# Patient Record
Sex: MALE | Race: WHITE | Employment: OTHER | URBAN - METROPOLITAN AREA
[De-identification: names, ages, dates, MRNs, and addresses within clinical notes are randomized per-mention and may not be internally consistent; named-entity substitution may affect disease eponyms.]

---

## 2017-01-25 ENCOUNTER — LAB ENCOUNTER (OUTPATIENT)
Dept: LAB | Facility: HOSPITAL | Age: 62
End: 2017-01-25
Attending: FAMILY MEDICINE
Payer: COMMERCIAL

## 2017-01-25 DIAGNOSIS — Z00.00 ROUTINE GENERAL MEDICAL EXAMINATION AT A HEALTH CARE FACILITY: Primary | ICD-10-CM

## 2017-01-25 LAB
25-HYDROXYVITAMIN D (TOTAL): 43.9 NG/ML (ref 30–100)
ALBUMIN SERPL-MCNC: 3.6 G/DL (ref 3.5–4.8)
ALP LIVER SERPL-CCNC: 55 U/L (ref 45–117)
ALT SERPL-CCNC: 36 U/L (ref 17–63)
AST SERPL-CCNC: 20 U/L (ref 15–41)
BILIRUB SERPL-MCNC: 0.8 MG/DL (ref 0.1–2)
BUN BLD-MCNC: 15 MG/DL (ref 8–20)
CALCIUM BLD-MCNC: 8.6 MG/DL (ref 8.3–10.3)
CHLORIDE: 106 MMOL/L (ref 101–111)
CHOLEST SMN-MCNC: 139 MG/DL (ref ?–200)
CO2: 29 MMOL/L (ref 22–32)
COMPLEXED PSA SERPL-MCNC: 1.29 NG/ML (ref 0.01–4)
CREAT BLD-MCNC: 0.89 MG/DL (ref 0.7–1.3)
GLUCOSE BLD-MCNC: 98 MG/DL (ref 70–99)
HDLC SERPL-MCNC: 43 MG/DL (ref 45–?)
HDLC SERPL: 3.23 {RATIO} (ref ?–4.97)
LDLC SERPL CALC-MCNC: 84 MG/DL (ref ?–130)
M PROTEIN MFR SERPL ELPH: 6.5 G/DL (ref 6.1–8.3)
NONHDLC SERPL-MCNC: 96 MG/DL (ref ?–130)
POTASSIUM SERPL-SCNC: 4.2 MMOL/L (ref 3.6–5.1)
SODIUM SERPL-SCNC: 140 MMOL/L (ref 136–144)
TRIGLYCERIDES: 62 MG/DL (ref ?–150)
VLDL: 12 MG/DL (ref 5–40)

## 2017-01-25 PROCEDURE — 36415 COLL VENOUS BLD VENIPUNCTURE: CPT

## 2017-01-25 PROCEDURE — 80061 LIPID PANEL: CPT

## 2017-01-25 PROCEDURE — 82306 VITAMIN D 25 HYDROXY: CPT

## 2017-01-25 PROCEDURE — 80053 COMPREHEN METABOLIC PANEL: CPT

## 2017-06-15 ENCOUNTER — HOSPITAL ENCOUNTER (OUTPATIENT)
Dept: ULTRASOUND IMAGING | Facility: HOSPITAL | Age: 62
Discharge: HOME | End: 2017-06-15
Attending: OTOLARYNGOLOGY
Payer: COMMERCIAL

## 2017-06-15 DIAGNOSIS — R59.9 ENLARGED LYMPH NODES: ICD-10-CM

## 2017-06-15 PROCEDURE — 76536 US EXAM OF HEAD AND NECK: CPT | Performed by: OTOLARYNGOLOGY

## 2017-09-13 ENCOUNTER — LAB ENCOUNTER (OUTPATIENT)
Dept: LAB | Facility: HOSPITAL | Age: 62
End: 2017-09-13
Attending: FAMILY MEDICINE
Payer: COMMERCIAL

## 2017-09-13 DIAGNOSIS — M35.00 SICCA SYNDROME (HCC): Primary | ICD-10-CM

## 2017-09-13 LAB
25-HYDROXYVITAMIN D (TOTAL): 98.2 NG/ML (ref 30–100)
ALBUMIN SERPL-MCNC: 3.7 G/DL (ref 3.5–4.8)
ALP LIVER SERPL-CCNC: 55 U/L (ref 45–117)
ALT SERPL-CCNC: 37 U/L (ref 17–63)
ANA SCREEN: NEGATIVE
AST SERPL-CCNC: 16 U/L (ref 15–41)
BASOPHILS # BLD AUTO: 0.03 X10(3) UL (ref 0–0.1)
BASOPHILS NFR BLD AUTO: 0.6 %
BILIRUB SERPL-MCNC: 0.6 MG/DL (ref 0.1–2)
BUN BLD-MCNC: 17 MG/DL (ref 8–20)
C-REACTIVE PROTEIN: <0.29 MG/DL (ref ?–1)
CALCIUM BLD-MCNC: 9.1 MG/DL (ref 8.3–10.3)
CHLORIDE: 105 MMOL/L (ref 101–111)
CHOLEST SMN-MCNC: 123 MG/DL (ref ?–200)
CO2: 29 MMOL/L (ref 22–32)
CREAT BLD-MCNC: 0.77 MG/DL (ref 0.7–1.3)
EOSINOPHIL # BLD AUTO: 0.07 X10(3) UL (ref 0–0.3)
EOSINOPHIL NFR BLD AUTO: 1.3 %
ERYTHROCYTE [DISTWIDTH] IN BLOOD BY AUTOMATED COUNT: 12.5 % (ref 11.5–16)
GLUCOSE BLD-MCNC: 97 MG/DL (ref 70–99)
HCT VFR BLD AUTO: 43.6 % (ref 37–53)
HDLC SERPL-MCNC: 45 MG/DL (ref 45–?)
HDLC SERPL: 2.73 {RATIO} (ref ?–4.97)
HGB BLD-MCNC: 14.7 G/DL (ref 13–17)
IMMATURE GRANULOCYTE COUNT: 0.01 X10(3) UL (ref 0–1)
IMMATURE GRANULOCYTE RATIO %: 0.2 %
LDLC SERPL CALC-MCNC: 66 MG/DL (ref ?–130)
LDLC SERPL-MCNC: 12 MG/DL (ref 5–40)
LYMPHOCYTES # BLD AUTO: 1.23 X10(3) UL (ref 0.9–4)
LYMPHOCYTES NFR BLD AUTO: 22.9 %
M PROTEIN MFR SERPL ELPH: 7 G/DL (ref 6.1–8.3)
MCH RBC QN AUTO: 32.8 PG (ref 27–33.2)
MCHC RBC AUTO-ENTMCNC: 33.7 G/DL (ref 31–37)
MCV RBC AUTO: 97.3 FL (ref 80–99)
MONOCYTES # BLD AUTO: 0.64 X10(3) UL (ref 0.1–0.6)
MONOCYTES NFR BLD AUTO: 11.9 %
NEUTROPHIL ABS PRELIM: 3.4 X10 (3) UL (ref 1.3–6.7)
NEUTROPHILS # BLD AUTO: 3.4 X10(3) UL (ref 1.3–6.7)
NEUTROPHILS NFR BLD AUTO: 63.1 %
NONHDLC SERPL-MCNC: 78 MG/DL (ref ?–130)
PLATELET # BLD AUTO: 143 10(3)UL (ref 150–450)
POTASSIUM SERPL-SCNC: 4.3 MMOL/L (ref 3.6–5.1)
PSA SERPL-MCNC: 0.84 NG/ML (ref 0.01–4)
RBC # BLD AUTO: 4.48 X10(6)UL (ref 4.3–5.7)
RED CELL DISTRIBUTION WIDTH-SD: 44.4 FL (ref 35.1–46.3)
RHEUMATOID FACT SERPL-ACNC: <10 IU/ML (ref ?–15)
SED RATE-ML: 2 MM/HR (ref 0–12)
SODIUM SERPL-SCNC: 139 MMOL/L (ref 136–144)
TRIGLYCERIDES: 58 MG/DL (ref ?–150)
WBC # BLD AUTO: 5.4 X10(3) UL (ref 4–13)

## 2017-09-13 PROCEDURE — 86431 RHEUMATOID FACTOR QUANT: CPT

## 2017-09-13 PROCEDURE — 36415 COLL VENOUS BLD VENIPUNCTURE: CPT

## 2017-09-13 PROCEDURE — 84153 ASSAY OF PSA TOTAL: CPT

## 2017-09-13 PROCEDURE — 82306 VITAMIN D 25 HYDROXY: CPT

## 2017-09-13 PROCEDURE — 85652 RBC SED RATE AUTOMATED: CPT

## 2017-09-13 PROCEDURE — 86140 C-REACTIVE PROTEIN: CPT

## 2017-09-13 PROCEDURE — 85025 COMPLETE CBC W/AUTO DIFF WBC: CPT

## 2017-09-13 PROCEDURE — 80053 COMPREHEN METABOLIC PANEL: CPT

## 2017-09-13 PROCEDURE — 86038 ANTINUCLEAR ANTIBODIES: CPT

## 2017-09-13 PROCEDURE — 80061 LIPID PANEL: CPT

## 2017-09-16 ENCOUNTER — HOSPITAL ENCOUNTER (EMERGENCY)
Facility: HOSPITAL | Age: 62
Discharge: HOME | End: 2017-09-16
Attending: EMERGENCY MEDICINE
Payer: COMMERCIAL

## 2017-09-16 ENCOUNTER — APPOINTMENT (OUTPATIENT)
Dept: GENERAL RADIOLOGY | Facility: HOSPITAL | Age: 62
End: 2017-09-16
Attending: EMERGENCY MEDICINE
Payer: COMMERCIAL

## 2017-09-16 VITALS
OXYGEN SATURATION: 99 % | WEIGHT: 149 LBS | BODY MASS INDEX: 22 KG/M2 | RESPIRATION RATE: 18 BRPM | SYSTOLIC BLOOD PRESSURE: 106 MMHG | DIASTOLIC BLOOD PRESSURE: 64 MMHG | TEMPERATURE: 98 F | HEART RATE: 71 BPM

## 2017-09-16 DIAGNOSIS — S60.022A CONTUSION OF LEFT INDEX FINGER WITHOUT DAMAGE TO NAIL, INITIAL ENCOUNTER: Primary | ICD-10-CM

## 2017-09-16 PROCEDURE — 99282 EMERGENCY DEPT VISIT SF MDM: CPT

## 2017-09-16 PROCEDURE — 99283 EMERGENCY DEPT VISIT LOW MDM: CPT

## 2017-09-16 NOTE — ED PROVIDER NOTES
Patient Seen in: [facility] Emergency Department    History   Patient presents with:  Upper Extremity Injury (musculoskeletal)    Stated Complaint: finger inj    HPI    69-year-old male presents with pain and discoloration to the digit of the left h stated complaint: finger inj  Other systems are as noted in HPI. Constitutional and vital signs reviewed. All other systems reviewed and negative except as noted above.     PSFH elements reviewed from today and agreed except as otherwise stated in HPI platelet count has been stable over the past 2 draws and I have no reason to suspect he has had a significant drop in the last 3 days. No other bruising or sources of bleeding reported.         Disposition and Plan     Clinical Impression:  Contusion of le

## 2017-09-16 NOTE — ED INITIAL ASSESSMENT (HPI)
States he had sudden onset of pain of pain ,swelling, ecchymosis to his left index finger yesterday.  No injury

## 2017-09-24 ENCOUNTER — HOSPITAL ENCOUNTER (EMERGENCY)
Facility: HOSPITAL | Age: 62
Discharge: HOME | End: 2017-09-24
Attending: EMERGENCY MEDICINE
Payer: COMMERCIAL

## 2017-09-24 VITALS
OXYGEN SATURATION: 97 % | DIASTOLIC BLOOD PRESSURE: 71 MMHG | BODY MASS INDEX: 21.22 KG/M2 | HEIGHT: 68 IN | WEIGHT: 140 LBS | RESPIRATION RATE: 16 BRPM | SYSTOLIC BLOOD PRESSURE: 118 MMHG | TEMPERATURE: 99 F | HEART RATE: 76 BPM

## 2017-09-24 DIAGNOSIS — R21 RASH: Primary | ICD-10-CM

## 2017-09-24 LAB
APTT PPP: 30.4 SECONDS (ref 25–34)
BASOPHILS # BLD AUTO: 0.04 X10(3) UL (ref 0–0.1)
BASOPHILS NFR BLD AUTO: 0.7 %
BUN BLD-MCNC: 19 MG/DL (ref 8–20)
CALCIUM BLD-MCNC: 8.6 MG/DL (ref 8.3–10.3)
CHLORIDE: 110 MMOL/L (ref 101–111)
CO2: 25 MMOL/L (ref 22–32)
CREAT BLD-MCNC: 0.73 MG/DL (ref 0.7–1.3)
EOSINOPHIL # BLD AUTO: 0.08 X10(3) UL (ref 0–0.3)
EOSINOPHIL NFR BLD AUTO: 1.4 %
ERYTHROCYTE [DISTWIDTH] IN BLOOD BY AUTOMATED COUNT: 12.5 % (ref 11.5–16)
GLUCOSE BLD-MCNC: 109 MG/DL (ref 70–99)
HCT VFR BLD AUTO: 38.6 % (ref 37–53)
HGB BLD-MCNC: 13.4 G/DL (ref 13–17)
IMMATURE GRANULOCYTE COUNT: 0.02 X10(3) UL (ref 0–1)
IMMATURE GRANULOCYTE RATIO %: 0.4 %
INR BLD: 1.23 (ref 0.89–1.11)
LYMPHOCYTES # BLD AUTO: 1.34 X10(3) UL (ref 0.9–4)
LYMPHOCYTES NFR BLD AUTO: 24.2 %
MCH RBC QN AUTO: 32.9 PG (ref 27–33.2)
MCHC RBC AUTO-ENTMCNC: 34.7 G/DL (ref 31–37)
MCV RBC AUTO: 94.8 FL (ref 80–99)
MONOCYTES # BLD AUTO: 0.66 X10(3) UL (ref 0.1–0.6)
MONOCYTES NFR BLD AUTO: 11.9 %
NEUTROPHIL ABS PRELIM: 3.39 X10 (3) UL (ref 1.3–6.7)
NEUTROPHILS # BLD AUTO: 3.39 X10(3) UL (ref 1.3–6.7)
NEUTROPHILS NFR BLD AUTO: 61.4 %
PLATELET # BLD AUTO: 144 10(3)UL (ref 150–450)
POTASSIUM SERPL-SCNC: 3.8 MMOL/L (ref 3.6–5.1)
PSA SERPL DL<=0.01 NG/ML-MCNC: 15.6 SECONDS (ref 12–14.3)
RBC # BLD AUTO: 4.07 X10(6)UL (ref 4.3–5.7)
RED CELL DISTRIBUTION WIDTH-SD: 43.8 FL (ref 35.1–46.3)
SODIUM SERPL-SCNC: 141 MMOL/L (ref 136–144)
WBC # BLD AUTO: 5.5 X10(3) UL (ref 4–13)

## 2017-09-24 PROCEDURE — 36415 COLL VENOUS BLD VENIPUNCTURE: CPT

## 2017-09-24 PROCEDURE — 85610 PROTHROMBIN TIME: CPT | Performed by: EMERGENCY MEDICINE

## 2017-09-24 PROCEDURE — 99283 EMERGENCY DEPT VISIT LOW MDM: CPT

## 2017-09-24 PROCEDURE — 80048 BASIC METABOLIC PNL TOTAL CA: CPT | Performed by: EMERGENCY MEDICINE

## 2017-09-24 PROCEDURE — 85730 THROMBOPLASTIN TIME PARTIAL: CPT | Performed by: EMERGENCY MEDICINE

## 2017-09-24 PROCEDURE — 85025 COMPLETE CBC W/AUTO DIFF WBC: CPT | Performed by: EMERGENCY MEDICINE

## 2017-09-24 NOTE — ED NOTES
Pt screaming from room \"Nurse! Nurse! \" Enter pt room. Pt requests blankets. Pt re-oriented to call light.

## 2017-09-24 NOTE — ED NOTES
Pt updated on poc. No distress noted. Pt denies any needs  Requests lights off in room.  Sts \"I would like to rest.\"

## 2017-09-24 NOTE — ED NOTES
DC instructions handed to pt. No distress noted. Pt denies any needs. Pt thanks staff for care.  Denies need for [facility] out of ED

## 2017-09-24 NOTE — ED PROVIDER NOTES
Patient Seen in: [facility] Emergency Department    History   Patient presents with:  Rash Skin Problem (integumentary)    Stated Complaint: red bumps on legs and feet    HPI    58-year-old white male who presents emergency room today for complaint red bumps on legs and feet  Other systems are as noted in HPI. Constitutional and vital signs reviewed. All other systems reviewed and negative except as noted above. PSFH elements reviewed from today and agreed except as otherwise stated in HPI. Notable for the following:     RBC 4.07 (*)     .0 (*)     Monocyte Absolute 0.66 (*)     All other components within normal limits   PTT, ACTIVATED - Normal    Narrative: The aPTT Heparin Therapeutic Range is approximately 65- 104 seconds.  The

## 2017-10-28 ENCOUNTER — LAB ENCOUNTER (OUTPATIENT)
Dept: LAB | Facility: HOSPITAL | Age: 62
End: 2017-10-28
Attending: Other
Payer: COMMERCIAL

## 2017-10-28 DIAGNOSIS — R20.2 PARESTHESIA: Primary | ICD-10-CM

## 2017-10-28 PROCEDURE — 82607 VITAMIN B-12: CPT

## 2017-10-28 PROCEDURE — 36415 COLL VENOUS BLD VENIPUNCTURE: CPT

## 2017-12-11 ENCOUNTER — HOSPITAL ENCOUNTER (EMERGENCY)
Facility: HOSPITAL | Age: 62
Discharge: HOME | End: 2017-12-11
Attending: EMERGENCY MEDICINE
Payer: COMMERCIAL

## 2017-12-11 VITALS
RESPIRATION RATE: 20 BRPM | SYSTOLIC BLOOD PRESSURE: 125 MMHG | HEART RATE: 65 BPM | WEIGHT: 140 LBS | DIASTOLIC BLOOD PRESSURE: 80 MMHG | OXYGEN SATURATION: 94 % | BODY MASS INDEX: 21 KG/M2 | TEMPERATURE: 98 F

## 2017-12-11 DIAGNOSIS — A05.9 FOOD CONTAMINATION: Primary | ICD-10-CM

## 2017-12-11 PROCEDURE — 99283 EMERGENCY DEPT VISIT LOW MDM: CPT

## 2017-12-11 PROCEDURE — 99282 EMERGENCY DEPT VISIT SF MDM: CPT

## 2017-12-12 NOTE — ED INITIAL ASSESSMENT (HPI)
62ym c/c of allergic rxn Pt state that ate a raw fish and develop a rash on his arm that started 15 min post eating fish

## 2017-12-12 NOTE — ED PROVIDER NOTES
Patient Seen in: [facility] Emergency Department    History   Patient presents with:   Allergic Rxn Allergies (immune)    Stated Complaint: poss reaction to raw fish    HPI    Patient accidentally bought and ate a raw sardine that he purchased at Who °C)  Temp src: Temporal  SpO2: 94 %  O2 Device: None (Room air)    Current:/80   Pulse 65   Temp 98.4 °F (36.9 °C) (Temporal)   Resp 20   Wt 63.5 kg   SpO2 94%   BMI 21.29 kg/m²         Physical Exam    Patient is sitting on an emergency department b

## 2017-12-28 ENCOUNTER — RT VISIT (OUTPATIENT)
Dept: RESPIRATORY THERAPY | Facility: HOSPITAL | Age: 62
End: 2017-12-28
Attending: INTERNAL MEDICINE
Payer: COMMERCIAL

## 2017-12-28 DIAGNOSIS — J45.909 ASTHMA: ICD-10-CM

## 2017-12-28 PROCEDURE — 94726 PLETHYSMOGRAPHY LUNG VOLUMES: CPT

## 2017-12-28 PROCEDURE — 94729 DIFFUSING CAPACITY: CPT

## 2017-12-28 PROCEDURE — 94060 EVALUATION OF WHEEZING: CPT

## 2018-01-02 NOTE — PROCEDURES
Spirometry and  flow volume loop are consistent with  mild airway obstruction. Good respond to bronchodilators   ( The FEV1 improved by 350 ml after albuterol was given (a 14% improvement).    Normal TLC, no evidence of restriction    The diffusing capac

## 2018-01-31 ENCOUNTER — APPOINTMENT (OUTPATIENT)
Dept: GENERAL RADIOLOGY | Facility: HOSPITAL | Age: 63
End: 2018-01-31
Attending: EMERGENCY MEDICINE
Payer: COMMERCIAL

## 2018-01-31 ENCOUNTER — HOSPITAL ENCOUNTER (EMERGENCY)
Facility: HOSPITAL | Age: 63
Discharge: HOME | End: 2018-01-31
Attending: EMERGENCY MEDICINE
Payer: COMMERCIAL

## 2018-01-31 VITALS
BODY MASS INDEX: 20.73 KG/M2 | HEIGHT: 69 IN | DIASTOLIC BLOOD PRESSURE: 70 MMHG | TEMPERATURE: 98 F | SYSTOLIC BLOOD PRESSURE: 117 MMHG | OXYGEN SATURATION: 96 % | HEART RATE: 66 BPM | WEIGHT: 140 LBS | RESPIRATION RATE: 16 BRPM

## 2018-01-31 DIAGNOSIS — S69.92XA FINGERNAIL INJURY, LEFT, INITIAL ENCOUNTER: ICD-10-CM

## 2018-01-31 DIAGNOSIS — S61.309A: Primary | ICD-10-CM

## 2018-01-31 PROCEDURE — 11760 REPAIR OF NAIL BED: CPT

## 2018-01-31 PROCEDURE — 73140 X-RAY EXAM OF FINGER(S): CPT | Performed by: EMERGENCY MEDICINE

## 2018-01-31 PROCEDURE — 99283 EMERGENCY DEPT VISIT LOW MDM: CPT

## 2018-01-31 PROCEDURE — 90471 IMMUNIZATION ADMIN: CPT

## 2018-01-31 RX ORDER — TETANUS AND DIPHTHERIA TOXOIDS ADSORBED 2; 2 [LF]/.5ML; [LF]/.5ML
0.5 INJECTION INTRAMUSCULAR ONCE
Status: DISCONTINUED | OUTPATIENT
Start: 2018-01-31 | End: 2018-01-31

## 2018-01-31 NOTE — ED NOTES
Pt verbalized understanding of care of wound and need to follow up in 2 days. Pt d/c'd with belongings with steady gait.

## 2018-01-31 NOTE — ED INITIAL ASSESSMENT (HPI)
Patient arrives with lacerations to third and fourth digits after falling on pavement. Nails falling off on both fingers. Patient did not hit head, unsure on last tetanus.

## 2018-01-31 NOTE — ED NOTES
Pt verbalizes he would prefer not to receive pertussis due to autoimmune history and concern regarding a reaction. Dr. Catherine Shah at bedside discussing risk/benefit with pt. Decision made that pt will receive Td at this time.

## 2018-01-31 NOTE — ED NOTES
Pt states he would now like the Tdap, based on what can happen as a result of pertussis.  Order changed per pt request.

## 2018-01-31 NOTE — ED PROVIDER NOTES
Patient Seen in: [facility] Emergency Department    History   Patient presents with:  Laceration Abrasion (integumentary)    Stated Complaint: fall onto left hand, nails coming off to 3rd and 4th digits    HPI    58-year-old male who presents to the %  O2 Device: None (Room air)    Current:/61   Pulse 73   Temp 97.9 °F (36.6 °C) (Temporal)   Resp 18   Ht 175.3 cm (5' 9\")   Wt 63.5 kg   SpO2 97%   BMI 20.67 kg/m²         Physical Exam  General: Nontoxic well-appearing 67-year-old male in no dist pm    Follow-up:  David De Guzman MD  [id number] [facility]  [address] [address] [phone]    Schedule an appointment as soon as possible for a visit in 2 days  For wound re-check        Medications Prescribed:  Current Discharge Medication List

## 2018-01-31 NOTE — ED NOTES
Pt awake and alert, appears comfortable. Pt states he was walking up an incline with a wheeled garbage can. Pt states he fell and landed on ground with palms of hands up, garbage can on top. Pt c/o discomfort to left 3rd and 4th fingers.  Pt noted to have l

## 2018-02-24 ENCOUNTER — LAB ENCOUNTER (OUTPATIENT)
Dept: LAB | Facility: HOSPITAL | Age: 63
End: 2018-02-24
Attending: HOSPITALIST
Payer: COMMERCIAL

## 2018-02-24 DIAGNOSIS — R63.4 ABNORMAL LOSS OF WEIGHT: ICD-10-CM

## 2018-02-24 DIAGNOSIS — J30.2 SEASONAL ALLERGIC RHINITIS: Primary | ICD-10-CM

## 2018-02-24 LAB
ALBUMIN SERPL-MCNC: 3.5 G/DL (ref 3.5–4.8)
ALP LIVER SERPL-CCNC: 59 U/L (ref 45–117)
ALT SERPL-CCNC: 35 U/L (ref 17–63)
AST SERPL-CCNC: 17 U/L (ref 15–41)
BASOPHILS # BLD AUTO: 0.05 X10(3) UL (ref 0–0.1)
BASOPHILS NFR BLD AUTO: 0.8 %
BILIRUB SERPL-MCNC: 0.3 MG/DL (ref 0.1–2)
BUN BLD-MCNC: 17 MG/DL (ref 8–20)
CALCIUM BLD-MCNC: 8.9 MG/DL (ref 8.3–10.3)
CHLORIDE: 107 MMOL/L (ref 101–111)
CO2: 29 MMOL/L (ref 22–32)
CREAT BLD-MCNC: 0.81 MG/DL (ref 0.7–1.3)
EOSINOPHIL # BLD AUTO: 0.12 X10(3) UL (ref 0–0.3)
EOSINOPHIL NFR BLD AUTO: 1.9 %
ERYTHROCYTE [DISTWIDTH] IN BLOOD BY AUTOMATED COUNT: 12.7 % (ref 11.5–16)
GLUCOSE BLD-MCNC: 96 MG/DL (ref 70–99)
HCT VFR BLD AUTO: 45.6 % (ref 37–53)
HGB BLD-MCNC: 15.1 G/DL (ref 13–17)
IMMATURE GRANULOCYTE COUNT: 0.03 X10(3) UL (ref 0–1)
IMMATURE GRANULOCYTE RATIO %: 0.5 %
LYMPHOCYTES # BLD AUTO: 1.29 X10(3) UL (ref 0.9–4)
LYMPHOCYTES NFR BLD AUTO: 20.6 %
M PROTEIN MFR SERPL ELPH: 7 G/DL (ref 6.1–8.3)
MCH RBC QN AUTO: 32.7 PG (ref 27–33.2)
MCHC RBC AUTO-ENTMCNC: 33.1 G/DL (ref 31–37)
MCV RBC AUTO: 98.7 FL (ref 80–99)
MONOCYTES # BLD AUTO: 0.66 X10(3) UL (ref 0.1–1)
MONOCYTES NFR BLD AUTO: 10.6 %
NEUTROPHIL ABS PRELIM: 4.1 X10 (3) UL (ref 1.3–6.7)
NEUTROPHILS # BLD AUTO: 4.1 X10(3) UL (ref 1.3–6.7)
NEUTROPHILS NFR BLD AUTO: 65.6 %
PLATELET # BLD AUTO: 150 10(3)UL (ref 150–450)
POTASSIUM SERPL-SCNC: 4.2 MMOL/L (ref 3.6–5.1)
RBC # BLD AUTO: 4.62 X10(6)UL (ref 4.3–5.7)
RED CELL DISTRIBUTION WIDTH-SD: 45.7 FL (ref 35.1–46.3)
SED RATE-ML: 4 MM/HR (ref 0–12)
SODIUM SERPL-SCNC: 141 MMOL/L (ref 136–144)
TSI SER-ACNC: 1.75 MIU/ML (ref 0.35–5.5)
WBC # BLD AUTO: 6.3 X10(3) UL (ref 4–13)

## 2018-02-24 PROCEDURE — 82785 ASSAY OF IGE: CPT

## 2018-02-24 PROCEDURE — 85025 COMPLETE CBC W/AUTO DIFF WBC: CPT

## 2018-02-24 PROCEDURE — 85652 RBC SED RATE AUTOMATED: CPT

## 2018-02-24 PROCEDURE — 84443 ASSAY THYROID STIM HORMONE: CPT

## 2018-02-24 PROCEDURE — 36415 COLL VENOUS BLD VENIPUNCTURE: CPT

## 2018-02-24 PROCEDURE — 86003 ALLG SPEC IGE CRUDE XTRC EA: CPT

## 2018-02-24 PROCEDURE — 80053 COMPREHEN METABOLIC PANEL: CPT

## 2018-02-28 LAB
ALLERGEN,  IGE: <0.1 KU/L
ALLERGEN,  TREE IGE: <0.1 KU/L
ALLERGEN, A.ALTERNATA(TENUIS): 0.25 KU/L
ALLERGEN, BERMUDA GRASS IGE: <0.1 KU/L
ALLERGEN, BOX ELDER/MAPLE IGE: <0.1 KU/L
ALLERGEN, CAT DANDER IGE: <0.1 KU/L
ALLERGEN, COTTONWOOD IGE: <0.1 KU/L
ALLERGEN, D. FARINAE IGE: 15.4 KU/L
ALLERGEN, D.PTERONYSSINUS IGE: 4.75 KU/L
ALLERGEN, DOG DANDER IGE: 0.22 KU/L
ALLERGEN, GERMAN COCKROACH IGE: <0.1 KU/L
ALLERGEN, HORMODENDRUM IGE: <0.1 KU/L
ALLERGEN, MARSH ELDER IGE: 0.26 KU/L
ALLERGEN, MILK (COW) IGE: <0.1 KU/L
ALLERGEN, MOUNTAIN CEDAR IGE: <0.1 KU/L
ALLERGEN, MOUSE EPITHE IGE: <0.1 KU/L
ALLERGEN, MUCOR RACEMOSUS IGE: <0.1 KU/L
ALLERGEN, OAK TREE IGE: <0.1 KU/L
ALLERGEN, PEANUT IGE: <0.1 KU/L
ALLERGEN, PECAN TREE IGE: <0.1 KU/L
ALLERGEN, PENICILLIUM NOTATUM: <0.1 KU/L
ALLERGEN, PIGWEED IGE: <0.1 KU/L
ALLERGEN, RUSSIAN THISTLE IGE: <0.1 KU/L
ALLERGEN, SHORT RAGWEED IGE: 2.72 KU/L
ALLERGEN, TIMOTHY GRASS IGE: 1.3 KU/L
ALLERGEN, WALNUT TREE IGE: <0.1 KU/L
ALLERGEN, WHITE ASH IGE: <0.1 KU/L
ALLERGEN, WHITE MULBERRY IGE: <0.1 KU/L
ALLERGEN,ASPERGILLUS FUMIGATUS: <0.1 KU/L
IMMUNOGLOBULIN E: 192 KU/L

## 2018-03-15 ENCOUNTER — HOSPITAL ENCOUNTER (OUTPATIENT)
Dept: GENERAL RADIOLOGY | Facility: HOSPITAL | Age: 63
Discharge: HOME | End: 2018-03-15
Attending: FAMILY MEDICINE
Payer: COMMERCIAL

## 2018-03-15 DIAGNOSIS — R63.4 WEIGHT LOSS: ICD-10-CM

## 2018-03-15 PROCEDURE — 71046 X-RAY EXAM CHEST 2 VIEWS: CPT | Performed by: FAMILY MEDICINE

## 2018-03-22 ENCOUNTER — HOSPITAL ENCOUNTER (OUTPATIENT)
Dept: MRI IMAGING | Facility: HOSPITAL | Age: 63
Discharge: HOME | End: 2018-03-22
Attending: Other
Payer: COMMERCIAL

## 2018-03-22 DIAGNOSIS — R20.2 PARESTHESIA OF SKIN: ICD-10-CM

## 2018-03-28 PROBLEM — S61.309A: Status: ACTIVE | Noted: 2018-03-28

## 2018-04-02 ENCOUNTER — HOSPITAL ENCOUNTER (OUTPATIENT)
Dept: MRI IMAGING | Facility: HOSPITAL | Age: 63
Discharge: HOME | End: 2018-04-02
Attending: Other
Payer: COMMERCIAL

## 2018-04-02 PROCEDURE — 72141 MRI NECK SPINE W/O DYE: CPT | Performed by: OTHER

## 2018-04-26 ENCOUNTER — APPOINTMENT (OUTPATIENT)
Dept: GENERAL RADIOLOGY | Facility: HOSPITAL | Age: 63
End: 2018-04-26
Payer: COMMERCIAL

## 2018-04-26 ENCOUNTER — HOSPITAL ENCOUNTER (EMERGENCY)
Facility: HOSPITAL | Age: 63
Discharge: HOME | End: 2018-04-27
Payer: COMMERCIAL

## 2018-04-26 DIAGNOSIS — R07.89 CHEST PAIN, ATYPICAL: ICD-10-CM

## 2018-04-26 DIAGNOSIS — J45.21 MILD INTERMITTENT ASTHMA WITH ACUTE EXACERBATION: Primary | ICD-10-CM

## 2018-04-26 PROCEDURE — 85025 COMPLETE CBC W/AUTO DIFF WBC: CPT

## 2018-04-26 PROCEDURE — 71045 X-RAY EXAM CHEST 1 VIEW: CPT

## 2018-04-26 PROCEDURE — 93005 ELECTROCARDIOGRAM TRACING: CPT

## 2018-04-26 PROCEDURE — 80053 COMPREHEN METABOLIC PANEL: CPT

## 2018-04-26 PROCEDURE — 84484 ASSAY OF TROPONIN QUANT: CPT

## 2018-04-26 PROCEDURE — 85378 FIBRIN DEGRADE SEMIQUANT: CPT

## 2018-04-26 PROCEDURE — 99285 EMERGENCY DEPT VISIT HI MDM: CPT

## 2018-04-26 PROCEDURE — 36415 COLL VENOUS BLD VENIPUNCTURE: CPT

## 2018-04-26 PROCEDURE — 93010 ELECTROCARDIOGRAM REPORT: CPT

## 2018-04-26 RX ORDER — IPRATROPIUM BROMIDE AND ALBUTEROL SULFATE 2.5; .5 MG/3ML; MG/3ML
3 SOLUTION RESPIRATORY (INHALATION) ONCE
Status: COMPLETED | OUTPATIENT
Start: 2018-04-26 | End: 2018-04-27

## 2018-04-26 RX ORDER — PREDNISONE 20 MG/1
60 TABLET ORAL DAILY
Qty: 15 TABLET | Refills: 0 | Status: SHIPPED | OUTPATIENT
Start: 2018-04-26 | End: 2018-05-01

## 2018-04-26 RX ORDER — ALBUTEROL SULFATE 90 UG/1
2 AEROSOL, METERED RESPIRATORY (INHALATION) EVERY 4 HOURS PRN
Qty: 1 INHALER | Refills: 0 | Status: SHIPPED | OUTPATIENT
Start: 2018-04-26 | End: 2018-05-26

## 2018-04-27 VITALS
DIASTOLIC BLOOD PRESSURE: 62 MMHG | SYSTOLIC BLOOD PRESSURE: 115 MMHG | HEART RATE: 55 BPM | OXYGEN SATURATION: 99 % | RESPIRATION RATE: 16 BRPM | HEIGHT: 68 IN | WEIGHT: 140 LBS | TEMPERATURE: 98 F | BODY MASS INDEX: 21.22 KG/M2

## 2018-04-27 PROCEDURE — 94640 AIRWAY INHALATION TREATMENT: CPT

## 2018-04-27 PROCEDURE — 84484 ASSAY OF TROPONIN QUANT: CPT

## 2018-04-27 NOTE — ED INITIAL ASSESSMENT (HPI)
Pt presents to ED with complaint of chest tightness x1 day. Pt reports worsening this evening.  Reports minimal SOB

## 2018-04-27 NOTE — ED NOTES
Assumed care, bedside report from [facility]. Pt resting in bed. States, \"pain is not real bad, just more of a tightness in my chest like a 3/10. The shortness of breath is just a little bit. \" Resps easy, regular. Pt denies need at this time.  Call light i

## 2018-04-27 NOTE — ED PROVIDER NOTES
Patient Seen in: [facility] Emergency Department    History   Patient presents with:  Chest Pain Angina (cardiovascular)    Stated Complaint:     HPI    Pleasant male who has a history of allergies, especially notes that he gets more trouble breathi Shoulder        Smoking status: Never Smoker                                                              Smokeless tobacco: Never Used                      Alcohol use:  No                Review of Systems    Positive for stated complaint:   Other systems (FEU) have been shown to contribute to the exclusion of venous thromboembolism with a negative predictive value of approximately 95% when results are used as part of the total clinical evaluation of the patient.    TROPONIN I - Normal   CBC WITH DIFFERENTIA narrowing. No central canal stenosis. C3-C4:  Mild diffuse osteophyte disc complex with mild bilateral neural foraminal narrowing. No central canal stenosis.  C4-C5:  Diffuse osteophyte disc complex with a right paracentral and lateral component with near atelectasis. Lungs are hyperexpanded. Trace right pleural effusion. No measurable pneumothorax. CONCLUSION:  Trace right pleural effusion. Minimal bibasilar atelectasis. Lungs are hyperexpanded.      Dictated by: Lauren Patel MD on 4/26/2018

## 2018-05-08 ENCOUNTER — APPOINTMENT (OUTPATIENT)
Dept: GENERAL RADIOLOGY | Facility: HOSPITAL | Age: 63
End: 2018-05-08
Attending: EMERGENCY MEDICINE
Payer: COMMERCIAL

## 2018-05-08 ENCOUNTER — HOSPITAL ENCOUNTER (EMERGENCY)
Facility: HOSPITAL | Age: 63
Discharge: HOME | End: 2018-05-09
Attending: EMERGENCY MEDICINE
Payer: COMMERCIAL

## 2018-05-08 DIAGNOSIS — R07.9 CHEST PAIN WITH LOW RISK FOR CARDIAC ETIOLOGY: Primary | ICD-10-CM

## 2018-05-08 PROCEDURE — 99285 EMERGENCY DEPT VISIT HI MDM: CPT

## 2018-05-08 PROCEDURE — 85025 COMPLETE CBC W/AUTO DIFF WBC: CPT | Performed by: EMERGENCY MEDICINE

## 2018-05-08 PROCEDURE — 85378 FIBRIN DEGRADE SEMIQUANT: CPT | Performed by: EMERGENCY MEDICINE

## 2018-05-08 PROCEDURE — 93005 ELECTROCARDIOGRAM TRACING: CPT

## 2018-05-08 PROCEDURE — 80053 COMPREHEN METABOLIC PANEL: CPT | Performed by: EMERGENCY MEDICINE

## 2018-05-08 PROCEDURE — 84484 ASSAY OF TROPONIN QUANT: CPT | Performed by: EMERGENCY MEDICINE

## 2018-05-08 PROCEDURE — 93010 ELECTROCARDIOGRAM REPORT: CPT

## 2018-05-08 PROCEDURE — 71045 X-RAY EXAM CHEST 1 VIEW: CPT | Performed by: EMERGENCY MEDICINE

## 2018-05-08 PROCEDURE — 36415 COLL VENOUS BLD VENIPUNCTURE: CPT

## 2018-05-08 RX ORDER — NITROGLYCERIN 0.4 MG/1
0.4 TABLET SUBLINGUAL ONCE
Status: COMPLETED | OUTPATIENT
Start: 2018-05-08 | End: 2018-05-09

## 2018-05-09 ENCOUNTER — HOSPITAL ENCOUNTER (OUTPATIENT)
Facility: HOSPITAL | Age: 63
Setting detail: OBSERVATION
Discharge: HOME | End: 2018-05-10
Attending: EMERGENCY MEDICINE | Admitting: HOSPITALIST
Payer: COMMERCIAL

## 2018-05-09 ENCOUNTER — APPOINTMENT (OUTPATIENT)
Dept: CV DIAGNOSTICS | Facility: HOSPITAL | Age: 63
End: 2018-05-09
Attending: EMERGENCY MEDICINE
Payer: COMMERCIAL

## 2018-05-09 VITALS
TEMPERATURE: 98 F | DIASTOLIC BLOOD PRESSURE: 61 MMHG | OXYGEN SATURATION: 96 % | HEART RATE: 51 BPM | RESPIRATION RATE: 10 BRPM | SYSTOLIC BLOOD PRESSURE: 99 MMHG

## 2018-05-09 DIAGNOSIS — R07.89 CHEST PAIN, ATYPICAL: Primary | ICD-10-CM

## 2018-05-09 DIAGNOSIS — R94.39 ABNORMAL CARDIOVASCULAR STRESS TEST: ICD-10-CM

## 2018-05-09 PROBLEM — R73.9 HYPERGLYCEMIA: Status: ACTIVE | Noted: 2018-05-09

## 2018-05-09 PROBLEM — D69.6 THROMBOCYTOPENIA (HCC): Status: ACTIVE | Noted: 2018-05-09

## 2018-05-09 PROBLEM — R79.89 AZOTEMIA: Status: ACTIVE | Noted: 2018-05-09

## 2018-05-09 PROBLEM — D69.6 THROMBOCYTOPENIA: Status: ACTIVE | Noted: 2018-05-09

## 2018-05-09 PROCEDURE — 93350 STRESS TTE ONLY: CPT | Performed by: EMERGENCY MEDICINE

## 2018-05-09 PROCEDURE — 93018 CV STRESS TEST I&R ONLY: CPT | Performed by: EMERGENCY MEDICINE

## 2018-05-09 PROCEDURE — 36415 COLL VENOUS BLD VENIPUNCTURE: CPT

## 2018-05-09 PROCEDURE — 84484 ASSAY OF TROPONIN QUANT: CPT | Performed by: EMERGENCY MEDICINE

## 2018-05-09 PROCEDURE — 93017 CV STRESS TEST TRACING ONLY: CPT | Performed by: EMERGENCY MEDICINE

## 2018-05-09 PROCEDURE — 99285 EMERGENCY DEPT VISIT HI MDM: CPT

## 2018-05-09 PROCEDURE — 93005 ELECTROCARDIOGRAM TRACING: CPT

## 2018-05-09 PROCEDURE — 81003 URINALYSIS AUTO W/O SCOPE: CPT | Performed by: EMERGENCY MEDICINE

## 2018-05-09 PROCEDURE — 93010 ELECTROCARDIOGRAM REPORT: CPT

## 2018-05-09 RX ORDER — MULTIVITAMIN WITH FOLIC ACID 400 MCG
1 TABLET ORAL DAILY
COMMUNITY

## 2018-05-09 RX ORDER — BISACODYL 10 MG
10 SUPPOSITORY, RECTAL RECTAL
Status: DISCONTINUED | OUTPATIENT
Start: 2018-05-09 | End: 2018-05-10

## 2018-05-09 RX ORDER — TRAMADOL HYDROCHLORIDE 50 MG/1
50 TABLET ORAL EVERY 6 HOURS PRN
COMMUNITY
End: 2018-11-20

## 2018-05-09 RX ORDER — MULTIVIT-MIN/IRON/FOLIC ACID/K 18-600-40
2000 CAPSULE ORAL DAILY
COMMUNITY

## 2018-05-09 RX ORDER — SODIUM PHOSPHATE, DIBASIC AND SODIUM PHOSPHATE, MONOBASIC 7; 19 G/133ML; G/133ML
1 ENEMA RECTAL ONCE AS NEEDED
Status: DISCONTINUED | OUTPATIENT
Start: 2018-05-09 | End: 2018-05-10

## 2018-05-09 RX ORDER — ACETAMINOPHEN 325 MG/1
650 TABLET ORAL EVERY 6 HOURS PRN
Status: DISCONTINUED | OUTPATIENT
Start: 2018-05-09 | End: 2018-05-10

## 2018-05-09 RX ORDER — DOCUSATE SODIUM 100 MG/1
100 CAPSULE, LIQUID FILLED ORAL 2 TIMES DAILY
Status: DISCONTINUED | OUTPATIENT
Start: 2018-05-09 | End: 2018-05-10

## 2018-05-09 RX ORDER — POLYETHYLENE GLYCOL 3350 17 G/17G
17 POWDER, FOR SOLUTION ORAL DAILY PRN
Status: DISCONTINUED | OUTPATIENT
Start: 2018-05-09 | End: 2018-05-10

## 2018-05-09 RX ORDER — ALBUTEROL SULFATE 90 UG/1
2 AEROSOL, METERED RESPIRATORY (INHALATION) EVERY 4 HOURS PRN
Status: DISCONTINUED | OUTPATIENT
Start: 2018-05-09 | End: 2018-05-10

## 2018-05-09 RX ORDER — MAGNESIUM HYDROXIDE/ALUMINUM HYDROXICE/SIMETHICONE 120; 1200; 1200 MG/30ML; MG/30ML; MG/30ML
30 SUSPENSION ORAL ONCE
Status: COMPLETED | OUTPATIENT
Start: 2018-05-09 | End: 2018-05-09

## 2018-05-09 NOTE — ED NOTES
Pt sitting up on stretcher reading bible. Informed pt of admit. Awaiting bed.  Pt stating intermittent cp feeling like twinges in the chest.

## 2018-05-09 NOTE — ED INITIAL ASSESSMENT (HPI)
c/o chest discomfort , sudden onset, seen for same 1 week ago. Pt reports inhaler use at home did not help.

## 2018-05-09 NOTE — ED INITIAL ASSESSMENT (HPI)
Pt states he was in ED approx 6 hours ago for chest pain, went home with plan to follow up with cardiology. When he called his cardiologist office, was informed they no longer take his insurance.   Pt states his pain is also getting worse, and he was instr

## 2018-05-09 NOTE — ED NOTES
Pt is very anxious, asking if he will stop breathing if he takes \"GI cocktail\". Pt's concerns were addressed and pt awaits repeat troponin in 30 minutes. Call light in reach.

## 2018-05-09 NOTE — PLAN OF CARE
CARDIOVASCULAR - ADULT    • Maintains optimal cardiac output and hemodynamic stability Progressing    • Absence of cardiac arrhythmias or at baseline Progressing        Pt AOx3/ anxious/tearful, SR/SB, lugns CTA, bs present x 4 quadrants.  Pt denies SOB, di

## 2018-05-09 NOTE — PROGRESS NOTES
Cardiology followup  Patient chooses not to stay in the hospital we'll set out outpatient nuclear stress test.

## 2018-05-09 NOTE — PROGRESS NOTES
Exercise echo 5/9/2018    9 min 31 sec.  1-1.5 mm st seg depression with associated chest discomfort  Echo images normal to the extent that the endocardium was visualized. The apical views were somewhat difficult to see.   Imaging was also not at target hear

## 2018-05-09 NOTE — CONSULTS
[facility] Cardiology Consultation Ansley Villeda MD    The patient was interviewed, examined, the chart was reviewed and the consult was dictated. This is a 60year old male with a chief complaint of chest discomfort. Impression:  1.   Chest discomfort–atypi

## 2018-05-09 NOTE — H&P
[facility] Hospitalist History and Physical      Patient presents with:  Chest Pain Angina (cardiovascular)       PCP: Eda Mcneal MD      History of Present Illness: Patient is a 60year old male with PMH sig for asthma, Sjogrens presents for eval of chest discomf 1 tablet by mouth daily. Cinnamon 500 MG Oral Tab Take 500 mg by mouth daily. Turmeric 450 MG Oral Cap Take 1 tablet by mouth daily.             Smoking status: Never Smoker    Smokeless tobacco: Never Used    Alcohol use No        Fam Hx  History r chest pain  PATIENT STATED HISTORY: (As transcribed by Technologist)  Pt. with  c/o chest discomfort , sudden onset, seen for same 1 week ago. Pt reports inhaler use at home did not help. FINDINGS:  Normal heart size and pulmonary vascularity.   Mild ri

## 2018-05-09 NOTE — CONSULTS
[address]    PATIENT'S NAME: Julissa Coleman   ATTENDING PHYSICIAN: Robert Camara M.D.   CONSULTING PHYSICIAN: Kathya Magdaleno M.D.    PATIENT ACCOUNT#:   [de-identified]    LOCATION:  [address] [place] [address]  MEDICAL RECORD #:   [id number]       DATE OF BI emergency room, and during his interview with me, he is quite tearful. PAST MEDICAL HISTORY:  Significant for orthopedic problems, pulmonary issues, chest discomfort with negative workup, asthma.     MEDICATIONS:  Vitamins, tramadol, inhalers, over-the-c

## 2018-05-09 NOTE — ED NOTES
Patient is resting comfortably on stretcher c/o intermittent cp with tightness . currently denies pain at this time.

## 2018-05-09 NOTE — ED PROVIDER NOTES
Patient Seen in: [facility] Emergency Department    History   Patient presents with:  Chest Pain Angina (cardiovascular)    Stated Complaint: chest pain    HPI    Patiently initially seen by Dr. Cristo Cooney in the emergency department in care of the wound. Alcohol use: No                Review of Systems    Positive for stated complaint: chest pain  Other systems are as noted in HPI. Constitutional and vital signs reviewed.       All other systems reviewed and negative except as noted above cardiology did call and reported the stress test was abnormal and the [address] discuss the case with Dr. Sohpia Kilgore, apparently the patient has insurance constraints that dictate he needs to be admit

## 2018-05-09 NOTE — ED PROVIDER NOTES
Patient Seen in: [facility] Emergency Department    History   Patient presents with:  Chest Pain Angina (cardiovascular)    Stated Complaint: chest pain    HPI     Patient is a 39-year-old gentleman presenting with fleeting sharp chest pain started noted above.     Physical Exam   ED Triage Vitals  BP: 127/73 [05/08/18 2252]  Pulse: 67 [05/08/18 2252]  Resp: 18 [05/08/18 2252]  Temp: 98.2 °F (36.8 °C) [05/08/18 2252]  Temp src: Temporal [05/08/18 2252]  SpO2: 96 % [05/09/18 0100]  O2 Device: None [facility] clinical [facility] of the [facility]. TROPONIN I - Normal   CBC WITH DIFFERENTIAL WITH PLATELET    Narrative: The following orders were created for panel order CBC WITH DIFFERENTIAL WITH PLATELET.   Procedure                               Abnormality 0500  ------------------------------------------------------------      MDM     IV established. Patient was placed on cardiac monitor. No events are noted. His vitals have remained stable throughout ED course.   He was given nitro and a GI cocktail but t

## 2018-05-10 ENCOUNTER — APPOINTMENT (OUTPATIENT)
Dept: CV DIAGNOSTICS | Facility: HOSPITAL | Age: 63
End: 2018-05-10
Attending: INTERNAL MEDICINE
Payer: COMMERCIAL

## 2018-05-10 VITALS
SYSTOLIC BLOOD PRESSURE: 115 MMHG | HEART RATE: 69 BPM | DIASTOLIC BLOOD PRESSURE: 69 MMHG | RESPIRATION RATE: 18 BRPM | BODY MASS INDEX: 21.98 KG/M2 | WEIGHT: 145 LBS | OXYGEN SATURATION: 98 % | HEIGHT: 68 IN | TEMPERATURE: 98 F

## 2018-05-10 PROCEDURE — 78452 HT MUSCLE IMAGE SPECT MULT: CPT | Performed by: INTERNAL MEDICINE

## 2018-05-10 PROCEDURE — 93018 CV STRESS TEST I&R ONLY: CPT | Performed by: INTERNAL MEDICINE

## 2018-05-10 PROCEDURE — 93017 CV STRESS TEST TRACING ONLY: CPT | Performed by: INTERNAL MEDICINE

## 2018-05-10 PROCEDURE — 85025 COMPLETE CBC W/AUTO DIFF WBC: CPT | Performed by: INTERNAL MEDICINE

## 2018-05-10 PROCEDURE — 80048 BASIC METABOLIC PNL TOTAL CA: CPT | Performed by: INTERNAL MEDICINE

## 2018-05-10 RX ORDER — LORAZEPAM 0.5 MG/1
0.5 TABLET ORAL ONCE AS NEEDED
Status: DISCONTINUED | OUTPATIENT
Start: 2018-05-10 | End: 2018-05-10

## 2018-05-10 NOTE — PROGRESS NOTES
Called to patient room as pt was very anxious about who he should procede with his health care. He did speak with a  in ER who directed him to call his [employer] for more info.  Patient did and found out some more information, but not specifically a

## 2018-05-10 NOTE — PLAN OF CARE
Assumed care of patient around 0730. Pt a/o x 4, RA, NSR on tele monitor. Denies chest pain/discomfort or SOB at present. Pt declined stress test this a.m.  PA notified and spoke to pt who was now agreeable to the stress test. Pt arrived downstairs, and diana

## 2018-05-10 NOTE — PLAN OF CARE
NURSING DISCHARGE NOTE    Discharged Home via Wheelchair. Accompanied by Support staff  Belongings Taken by patient/family. IV removed, IV catheter intact. D/c instructions given. No new prescriptions. F/u appts discussed.   All questions answ

## 2018-05-10 NOTE — PAYOR COMM NOTE
--------------  ADMISSION REVIEW     Payor: Camila GONG/KATIE  Subscriber #:  [id number]  Authorization Number: N/A    Admit date: N/A  Admit time: N/A       Patient Seen in: [facility] Emergency Department    History   Stated Complaint: chest pain Illness: Patient is a 60year old male with PMH sig for asthma, Sjogrens presents for eval of chest discomfort. He is very anxious in the ER and reports a lot of stress in his life. Sx can come on at rest.  Not necessarily related to exertion.   No fevers outpatient stress testing. The patient returned today with more symptoms, and therefore, had a stress echo.   The stress echo was reported as being mildly abnormal and was stated as equivocal.  I looked at the stress test, and though there are ST segment c test.  He is ambivalent about whether he is going to participate in the test today or not.     Assessment/Plan:  -Minimal risk factors for coronary disease, atypical chest pain, negative EKGs and cardiac enzymes, equivocally abnormal stress echo.   Nuclear

## 2018-05-10 NOTE — PROGRESS NOTES
[facility] Cardiology Progress Note - Pura Jones Patient Status:  Inpatient    1955 MRN [id number]   [facility] 2NE-A Attending Teofilo Duque MD   Hosp Day # 1 PCP Dangelo Kennedy MD     Subjective:  Patient has 145 lb (65.8 kg)  04/26/18 2256 : 140 lb (63.5 kg)  01/31/18 1401 : 140 lb (63.5 kg)      Tele: NSR    Physical Exam:    General: Alert and oriented x 3. No apparent distress. No respiratory or constitutional distress.   HEENT: Normocephalic, anicteric scle ENEMA (FLEET) 7-19 GM/118ML enema 133 mL 1 enema Rectal Once PRN       ROS:  General Health: otherwise feels well, weight stable  Constitutiona: no recent fevers  Skin: denies any unusual skin lesions or rashes  Eyes: no visual complaints or deficits  HEEN

## 2018-05-10 NOTE — DISCHARGE SUMMARY
General Medicine Discharge Summary     Patient ID:  Luna Urbina  60year old  7/27/1955    Admit date: 5/9/2018    Discharge date and time: 5/10/18    Attending Physician: Marlyn Cruz MD     Primary Care Physician: Ortiz Newby MD     Reason for adm Pain.      Follow-up with   PCP       Total Time Coordinating Care: Greater than 30 minutes    Patient had opportunity to ask questions and state understand and agree with therapeutic plan as outlined above.      Thank Daryle Presto, MD  [facility]

## 2018-05-10 NOTE — PROGRESS NOTES
Preliminary Cardiac Diagnostic Note:    EKG changes noted after pt walked 10:01 on Karlos protocol for nuclear stress test  Pt denied cardiac symptoms  No arrhythmias  Nuc pending

## 2018-05-14 NOTE — PAYOR COMM NOTE
--------------  DISCHARGE REVIEW    Payor: Yulia GONG/KATIE  Subscriber #:  [id number]  Authorization Number: [id number]    Admit date: N/A  Admit time:  N/A  Discharge Date: 5/10/2018  4:31 PM  This was OBSERVATION stay     Admitting Physician: More Marie have NOT CHANGED    Cholecalciferol (VITAMIN D) 2000 units Oral Cap  Take 2,000 Units by mouth daily. Multiple Vitamin (TAB-A-TANJA) Oral Tab  Take 1 tablet by mouth daily.     Albuterol Sulfate  (90 Base) MCG/ACT Inhalation Aero Soln  Inhale 2 puf

## 2018-06-04 ENCOUNTER — HOSPITAL ENCOUNTER (OUTPATIENT)
Dept: GENERAL RADIOLOGY | Facility: HOSPITAL | Age: 63
Discharge: HOME | End: 2018-06-04
Attending: INTERNAL MEDICINE
Payer: COMMERCIAL

## 2018-06-04 DIAGNOSIS — J90 PLEURAL EFFUSION: ICD-10-CM

## 2018-06-04 PROCEDURE — 71046 X-RAY EXAM CHEST 2 VIEWS: CPT | Performed by: INTERNAL MEDICINE

## 2018-08-15 ENCOUNTER — LAB ENCOUNTER (OUTPATIENT)
Dept: LAB | Facility: HOSPITAL | Age: 63
End: 2018-08-15
Attending: FAMILY MEDICINE
Payer: COMMERCIAL

## 2018-08-15 DIAGNOSIS — Z00.01 ENCOUNTER FOR GENERAL ADULT MEDICAL EXAMINATION WITH ABNORMAL FINDINGS: Primary | ICD-10-CM

## 2018-08-15 LAB
ALBUMIN SERPL-MCNC: 3.6 G/DL (ref 3.5–4.8)
ALBUMIN/GLOB SERPL: 1.1 {RATIO} (ref 1–2)
ALP LIVER SERPL-CCNC: 63 U/L (ref 45–117)
ALT SERPL-CCNC: 38 U/L (ref 17–63)
ANION GAP SERPL CALC-SCNC: 5 MMOL/L (ref 0–18)
AST SERPL-CCNC: 19 U/L (ref 15–41)
BASOPHILS # BLD AUTO: 0.03 X10(3) UL (ref 0–0.1)
BASOPHILS NFR BLD AUTO: 0.6 %
BILIRUB SERPL-MCNC: 0.7 MG/DL (ref 0.1–2)
BILIRUB UR QL STRIP.AUTO: NEGATIVE
BUN BLD-MCNC: 13 MG/DL (ref 8–20)
BUN/CREAT SERPL: 15.5 (ref 10–20)
CALCIUM BLD-MCNC: 9.1 MG/DL (ref 8.3–10.3)
CHLORIDE SERPL-SCNC: 107 MMOL/L (ref 101–111)
CHOLEST SMN-MCNC: 130 MG/DL (ref ?–200)
CLARITY UR REFRACT.AUTO: CLEAR
CO2 SERPL-SCNC: 29 MMOL/L (ref 22–32)
CREAT BLD-MCNC: 0.84 MG/DL (ref 0.7–1.3)
EOSINOPHIL # BLD AUTO: 0.07 X10(3) UL (ref 0–0.3)
EOSINOPHIL NFR BLD AUTO: 1.4 %
ERYTHROCYTE [DISTWIDTH] IN BLOOD BY AUTOMATED COUNT: 12.4 % (ref 11.5–16)
GLOBULIN PLAS-MCNC: 3.2 G/DL (ref 2.5–3.7)
GLUCOSE BLD-MCNC: 98 MG/DL (ref 70–99)
GLUCOSE UR STRIP.AUTO-MCNC: NEGATIVE MG/DL
HCT VFR BLD AUTO: 44.4 % (ref 37–53)
HDLC SERPL-MCNC: 40 MG/DL (ref 40–59)
HGB BLD-MCNC: 14.9 G/DL (ref 13–17)
IMMATURE GRANULOCYTE COUNT: 0.01 X10(3) UL (ref 0–1)
IMMATURE GRANULOCYTE RATIO %: 0.2 %
KETONES UR STRIP.AUTO-MCNC: NEGATIVE MG/DL
LDLC SERPL CALC-MCNC: 76 MG/DL (ref ?–100)
LEUKOCYTE ESTERASE UR QL STRIP.AUTO: NEGATIVE
LYMPHOCYTES # BLD AUTO: 1.13 X10(3) UL (ref 0.9–4)
LYMPHOCYTES NFR BLD AUTO: 22.2 %
M PROTEIN MFR SERPL ELPH: 6.8 G/DL (ref 6.1–8.3)
MCH RBC QN AUTO: 32.4 PG (ref 27–33.2)
MCHC RBC AUTO-ENTMCNC: 33.6 G/DL (ref 31–37)
MCV RBC AUTO: 96.5 FL (ref 80–99)
MONOCYTES # BLD AUTO: 0.62 X10(3) UL (ref 0.1–1)
MONOCYTES NFR BLD AUTO: 12.2 %
NEUTROPHIL ABS PRELIM: 3.24 X10 (3) UL (ref 1.3–6.7)
NEUTROPHILS # BLD AUTO: 3.24 X10(3) UL (ref 1.3–6.7)
NEUTROPHILS NFR BLD AUTO: 63.4 %
NITRITE UR QL STRIP.AUTO: NEGATIVE
NONHDLC SERPL-MCNC: 90 MG/DL (ref ?–130)
OSMOLALITY SERPL CALC.SUM OF ELEC: 292 MOSM/KG (ref 275–295)
PH UR STRIP.AUTO: 6 [PH] (ref 4.5–8)
PLATELET # BLD AUTO: 171 10(3)UL (ref 150–450)
POTASSIUM SERPL-SCNC: 4.1 MMOL/L (ref 3.6–5.1)
PROT UR STRIP.AUTO-MCNC: NEGATIVE MG/DL
PSA SERPL-MCNC: 1.79 NG/ML (ref 0.01–4)
RBC # BLD AUTO: 4.6 X10(6)UL (ref 4.3–5.7)
RBC UR QL AUTO: NEGATIVE
RED CELL DISTRIBUTION WIDTH-SD: 43.8 FL (ref 35.1–46.3)
SODIUM SERPL-SCNC: 141 MMOL/L (ref 136–144)
SP GR UR STRIP.AUTO: <1.005 (ref 1–1.03)
TRIGL SERPL-MCNC: 69 MG/DL (ref 30–149)
UROBILINOGEN UR STRIP.AUTO-MCNC: <2 MG/DL
VIT D+METAB SERPL-MCNC: 37.8 NG/ML (ref 30–100)
VLDLC SERPL CALC-MCNC: 14 MG/DL (ref 0–30)
WBC # BLD AUTO: 5.1 X10(3) UL (ref 4–13)

## 2018-08-15 PROCEDURE — 85025 COMPLETE CBC W/AUTO DIFF WBC: CPT

## 2018-08-15 PROCEDURE — 84153 ASSAY OF PSA TOTAL: CPT

## 2018-08-15 PROCEDURE — 80053 COMPREHEN METABOLIC PANEL: CPT

## 2018-08-15 PROCEDURE — 81003 URINALYSIS AUTO W/O SCOPE: CPT

## 2018-08-15 PROCEDURE — 36415 COLL VENOUS BLD VENIPUNCTURE: CPT

## 2018-08-15 PROCEDURE — 82306 VITAMIN D 25 HYDROXY: CPT

## 2018-08-15 PROCEDURE — 80061 LIPID PANEL: CPT

## 2018-09-10 ENCOUNTER — HOSPITAL ENCOUNTER (OUTPATIENT)
Dept: MRI IMAGING | Facility: HOSPITAL | Age: 63
Discharge: HOME | End: 2018-09-10
Attending: INTERNAL MEDICINE
Payer: COMMERCIAL

## 2018-09-10 DIAGNOSIS — R10.9 ABDOMINAL PAIN: ICD-10-CM

## 2018-09-10 DIAGNOSIS — R63.4 WEIGHT LOSS: ICD-10-CM

## 2018-09-19 ENCOUNTER — HOSPITAL ENCOUNTER (OUTPATIENT)
Dept: MRI IMAGING | Facility: HOSPITAL | Age: 63
Discharge: HOME | End: 2018-09-19
Attending: INTERNAL MEDICINE
Payer: COMMERCIAL

## 2018-09-19 DIAGNOSIS — R10.9 ABDOMINAL PAIN: ICD-10-CM

## 2018-09-19 DIAGNOSIS — R63.4 WEIGHT LOSS: ICD-10-CM

## 2018-09-19 PROCEDURE — 74181 MRI ABDOMEN W/O CONTRAST: CPT | Performed by: INTERNAL MEDICINE

## 2018-09-19 PROCEDURE — 72195 MRI PELVIS W/O DYE: CPT | Performed by: INTERNAL MEDICINE

## 2018-11-20 ENCOUNTER — LAB ENCOUNTER (OUTPATIENT)
Dept: LAB | Facility: HOSPITAL | Age: 63
End: 2018-11-20
Attending: FAMILY MEDICINE
Payer: COMMERCIAL

## 2018-11-20 DIAGNOSIS — E55.9 AVITAMINOSIS D: Primary | ICD-10-CM

## 2018-11-20 DIAGNOSIS — Z00.01 ENCOUNTER FOR GENERAL ADULT MEDICAL EXAMINATION WITH ABNORMAL FINDINGS: ICD-10-CM

## 2018-11-20 PROCEDURE — 84153 ASSAY OF PSA TOTAL: CPT

## 2018-11-20 PROCEDURE — 36415 COLL VENOUS BLD VENIPUNCTURE: CPT

## 2018-11-20 PROCEDURE — 80061 LIPID PANEL: CPT

## 2018-11-20 PROCEDURE — 82306 VITAMIN D 25 HYDROXY: CPT

## 2018-11-20 PROCEDURE — 80053 COMPREHEN METABOLIC PANEL: CPT

## 2019-08-19 ENCOUNTER — LAB ENCOUNTER (OUTPATIENT)
Dept: LAB | Facility: HOSPITAL | Age: 64
End: 2019-08-19
Attending: FAMILY MEDICINE
Payer: MEDICARE

## 2019-08-19 DIAGNOSIS — N13.9 ACUTE UNILATERAL OBSTRUCTIVE UROPATHY: ICD-10-CM

## 2019-08-19 DIAGNOSIS — E55.9 VITAMIN D DEFICIENCY: ICD-10-CM

## 2019-08-19 DIAGNOSIS — I10 ESSENTIAL HYPERTENSION, MALIGNANT: Primary | ICD-10-CM

## 2019-08-19 LAB
ALBUMIN SERPL-MCNC: 3.5 G/DL (ref 3.4–5)
ALBUMIN/GLOB SERPL: 1.1 {RATIO} (ref 1–2)
ALP LIVER SERPL-CCNC: 72 U/L (ref 45–117)
ALT SERPL-CCNC: 40 U/L (ref 16–61)
ANION GAP SERPL CALC-SCNC: 4 MMOL/L (ref 0–18)
AST SERPL-CCNC: 21 U/L (ref 15–37)
BASOPHILS # BLD AUTO: 0.06 X10(3) UL (ref 0–0.2)
BASOPHILS NFR BLD AUTO: 1 %
BILIRUB SERPL-MCNC: 0.6 MG/DL (ref 0.1–2)
BILIRUB UR QL STRIP.AUTO: NEGATIVE
BUN BLD-MCNC: 11 MG/DL (ref 7–18)
BUN/CREAT SERPL: 13.6 (ref 10–20)
CALCIUM BLD-MCNC: 8.5 MG/DL (ref 8.5–10.1)
CHLORIDE SERPL-SCNC: 108 MMOL/L (ref 98–112)
CHOLEST SMN-MCNC: 124 MG/DL (ref ?–200)
CLARITY UR REFRACT.AUTO: CLEAR
CO2 SERPL-SCNC: 30 MMOL/L (ref 21–32)
COLOR UR AUTO: YELLOW
CREAT BLD-MCNC: 0.81 MG/DL (ref 0.7–1.3)
DEPRECATED RDW RBC AUTO: 47.3 FL (ref 35.1–46.3)
EOSINOPHIL # BLD AUTO: 0.14 X10(3) UL (ref 0–0.7)
EOSINOPHIL NFR BLD AUTO: 2.4 %
ERYTHROCYTE [DISTWIDTH] IN BLOOD BY AUTOMATED COUNT: 12.9 % (ref 11–15)
GLOBULIN PLAS-MCNC: 3.3 G/DL (ref 2.8–4.4)
GLUCOSE BLD-MCNC: 92 MG/DL (ref 70–99)
GLUCOSE UR STRIP.AUTO-MCNC: NEGATIVE MG/DL
HCT VFR BLD AUTO: 45.7 % (ref 39–53)
HDLC SERPL-MCNC: 38 MG/DL (ref 40–59)
HGB BLD-MCNC: 15 G/DL (ref 13–17.5)
IMM GRANULOCYTES # BLD AUTO: 0.02 X10(3) UL (ref 0–1)
IMM GRANULOCYTES NFR BLD: 0.3 %
KETONES UR STRIP.AUTO-MCNC: NEGATIVE MG/DL
LDLC SERPL CALC-MCNC: 70 MG/DL (ref ?–100)
LEUKOCYTE ESTERASE UR QL STRIP.AUTO: NEGATIVE
LYMPHOCYTES # BLD AUTO: 1.35 X10(3) UL (ref 1–4)
LYMPHOCYTES NFR BLD AUTO: 23 %
M PROTEIN MFR SERPL ELPH: 6.8 G/DL (ref 6.4–8.2)
MCH RBC QN AUTO: 32.8 PG (ref 26–34)
MCHC RBC AUTO-ENTMCNC: 32.8 G/DL (ref 31–37)
MCV RBC AUTO: 99.8 FL (ref 80–100)
MONOCYTES # BLD AUTO: 0.71 X10(3) UL (ref 0.1–1)
MONOCYTES NFR BLD AUTO: 12.1 %
NEUTROPHILS # BLD AUTO: 3.6 X10 (3) UL (ref 1.5–7.7)
NEUTROPHILS # BLD AUTO: 3.6 X10(3) UL (ref 1.5–7.7)
NEUTROPHILS NFR BLD AUTO: 61.2 %
NITRITE UR QL STRIP.AUTO: NEGATIVE
NONHDLC SERPL-MCNC: 86 MG/DL (ref ?–130)
OSMOLALITY SERPL CALC.SUM OF ELEC: 293 MOSM/KG (ref 275–295)
PH UR STRIP.AUTO: 6 [PH] (ref 4.5–8)
PLATELET # BLD AUTO: 152 10(3)UL (ref 150–450)
POTASSIUM SERPL-SCNC: 4.7 MMOL/L (ref 3.5–5.1)
PROT UR STRIP.AUTO-MCNC: NEGATIVE MG/DL
PSA SERPL-MCNC: 1.27 NG/ML (ref ?–4)
RBC # BLD AUTO: 4.58 X10(6)UL (ref 3.8–5.8)
RBC UR QL AUTO: NEGATIVE
SODIUM SERPL-SCNC: 142 MMOL/L (ref 136–145)
SP GR UR STRIP.AUTO: 1.01 (ref 1–1.03)
TRIGL SERPL-MCNC: 81 MG/DL (ref 30–149)
UROBILINOGEN UR STRIP.AUTO-MCNC: <2 MG/DL
VIT D+METAB SERPL-MCNC: 53.1 NG/ML (ref 30–100)
VLDLC SERPL CALC-MCNC: 16 MG/DL (ref 0–30)
WBC # BLD AUTO: 5.9 X10(3) UL (ref 4–11)

## 2019-08-19 PROCEDURE — 81003 URINALYSIS AUTO W/O SCOPE: CPT

## 2019-08-19 PROCEDURE — 82306 VITAMIN D 25 HYDROXY: CPT

## 2019-08-19 PROCEDURE — 80053 COMPREHEN METABOLIC PANEL: CPT

## 2019-08-19 PROCEDURE — 36415 COLL VENOUS BLD VENIPUNCTURE: CPT

## 2019-08-19 PROCEDURE — 80061 LIPID PANEL: CPT

## 2019-08-19 PROCEDURE — 85025 COMPLETE CBC W/AUTO DIFF WBC: CPT

## 2019-08-19 PROCEDURE — 84153 ASSAY OF PSA TOTAL: CPT

## 2019-08-21 ENCOUNTER — HOSPITAL ENCOUNTER (OUTPATIENT)
Dept: ULTRASOUND IMAGING | Facility: HOSPITAL | Age: 64
Discharge: HOME | End: 2019-08-21
Payer: MEDICARE

## 2019-08-21 DIAGNOSIS — R10.2 PERINEUM PAIN, MALE: ICD-10-CM

## 2019-08-21 PROCEDURE — 76882 US LMTD JT/FCL EVL NVASC XTR: CPT | Performed by: SURGERY

## 2019-08-21 PROCEDURE — 76882 US LMTD JT/FCL EVL NVASC XTR: CPT

## 2019-08-27 ENCOUNTER — OFFICE VISIT (OUTPATIENT)
Dept: SURGERY | Facility: CLINIC | Age: 64
End: 2019-08-27
Payer: MEDICARE

## 2019-08-27 VITALS
BODY MASS INDEX: 21.48 KG/M2 | DIASTOLIC BLOOD PRESSURE: 63 MMHG | WEIGHT: 145 LBS | HEART RATE: 65 BPM | SYSTOLIC BLOOD PRESSURE: 121 MMHG | HEIGHT: 69 IN | TEMPERATURE: 99 F

## 2019-08-27 DIAGNOSIS — R10.31 INGUINAL PAIN OF BOTH SIDES: Primary | ICD-10-CM

## 2019-08-27 DIAGNOSIS — R10.32 INGUINAL PAIN OF BOTH SIDES: Primary | ICD-10-CM

## 2019-08-27 DIAGNOSIS — L72.9 SCROTAL CYST: ICD-10-CM

## 2019-08-27 PROCEDURE — 99205 OFFICE O/P NEW HI 60 MIN: CPT | Performed by: COLON & RECTAL SURGERY

## 2019-08-27 NOTE — PATIENT INSTRUCTIONS
This patient presents with bilateral inguinal pain. He has a prolonged history regarding both testes in the spermatic cords bilaterally. He had an acute bulge and pain on the morning of July 15, 2019. He was moving heavy equipment.   He describes an ac He has never had prostate cancer or treatment. He is not on any blood thinners and has no cardiac history. He has never been told that he had to take antibiotic therapy prior to any dental procedures or operations.     He does suffer from vitiligo and overnight. Most likely it is interrupted venous drainage from the clipping of the left testicular vein, multiple procedures and the cord that are impairing the lymphatic outflow from the testicle.     At this point I have asked the patient to schedule a la

## 2019-08-27 NOTE — H&P
New Patient Visit Note       Active Problems      1. Inguinal pain of both sides    2.  Scrotal cyst        Chief Complaint   Patient presents with:  Hernia: pt c/o of bilateral groin pain onset since 7/2019      History of Present Illness   This patient pr nausea with this pain or vomiting with the pain. Both testes are remaining in place with no previous resections despite his multiple resections of varicoceles, spermatocele, and epididymal cyst.    He does not have any prostate problems.   He has never h Stroke Mother    • Diabetes Brother      Social History    Socioeconomic History      Marital status: Single      Spouse name: Not on file      Number of children: Not on file      Years of education: Not on file      Highest education level: Not on file and sleep disturbance.        Physical Findings   /63   Pulse 65   Temp 98.8 °F (37.1 °C) (Oral)   Ht 69\"   Wt 145 lb   BMI 21.41 kg/m²   Physical Exam   Abdominal: Bowel sounds are normal. He exhibits no distension, no fluid wave, no ascites and no No inguinal adenopathy present. Left: No inguinal adenopathy present. Nursing note and vitals reviewed.           Assessment   Inguinal pain of both sides  (primary encounter diagnosis)  Scrotal cyst      Plan   This patient presents with bilateral pain or vomiting with the pain. Both testes are remaining in place with no previous resections despite his multiple resections of varicoceles, spermatocele, and epididymal cyst.    He does not have any prostate problems.   He has never had prostate cance operation through a transverse incision. I believe his current swelling is not related to her hernia. It is most likely related to swelling of the cord structures that travels up the inguinal canal.  It always resolves overnight.   Most likely it is int

## 2019-09-04 ENCOUNTER — TELEPHONE (OUTPATIENT)
Dept: SURGERY | Facility: CLINIC | Age: 64
End: 2019-09-04

## 2019-09-04 NOTE — TELEPHONE ENCOUNTER
Est pt who has appt on Monday, states he is having increased groin pain and swelling and is wondering if he should schedule an MRI prior to his appt. Per office notes this was not indicated, will speak with surgeon and call pt back.

## 2019-09-09 ENCOUNTER — OFFICE VISIT (OUTPATIENT)
Dept: SURGERY | Facility: CLINIC | Age: 64
End: 2019-09-09
Payer: MEDICARE

## 2019-09-09 VITALS — SYSTOLIC BLOOD PRESSURE: 96 MMHG | HEART RATE: 78 BPM | DIASTOLIC BLOOD PRESSURE: 60 MMHG | TEMPERATURE: 98 F

## 2019-09-09 DIAGNOSIS — R10.32 INGUINAL PAIN OF BOTH SIDES: Primary | ICD-10-CM

## 2019-09-09 DIAGNOSIS — N43.3 HYDROCELE, UNSPECIFIED HYDROCELE TYPE: ICD-10-CM

## 2019-09-09 DIAGNOSIS — R10.31 INGUINAL PAIN OF BOTH SIDES: Primary | ICD-10-CM

## 2019-09-09 DIAGNOSIS — L72.9 SCROTAL CYST: ICD-10-CM

## 2019-09-09 PROCEDURE — 99214 OFFICE O/P EST MOD 30 MIN: CPT | Performed by: COLON & RECTAL SURGERY

## 2019-09-09 NOTE — PROGRESS NOTES
Follow Up Visit Note       Active Problems      1. Inguinal pain of both sides    2. Scrotal cyst    3.  Hydrocele, unspecified hydrocele type          Chief Complaint   Patient presents with:  Hernia:  LEFT INGUINAL HERNIA CHECK -- C/O PAIN 3/10, DENIES FE clearly worse with heavy lifting and exercise. He does not have a chronic cough. He has no trouble urinating. He has no symptoms of severe constipation. He has been to his family doctor who noticed a bulge and felt that he had a left-sided hernia. L KNEE MENICUS REPAIR, R AND L SHOULDER ROTATOR CUFF  REPAIR   • OTHER SURGICAL HISTORY  80's    L varicocelectomy   • OTHER SURGICAL HISTORY  90's    R hydrocelectomy   • OTHER SURGICAL HISTORY      L&R Shoulder       The family history and social hi difficulty urinating, dysuria, frequency and urgency. Musculoskeletal: Negative for arthralgias and myalgias. Skin: Negative for color change and rash. Neurological: Negative for tremors, syncope and weakness.    Hematological: Negative for adenopathy clipping of a left sided varicocele. Clinical exam of the left cord reveals him to have a thickened cord, exquisitely tender. It is thickened all the way down to the top of the testicle. Left testicle is somewhat atrophic.   There are no palpable extra c groin. It had associated pain. There was burning and went to a dull ache. This is happened multiple times. He actually saw another surgeon, Iqra Henderson. He ordered an ultrasound of the inguinal regions bilaterally.   No hernia was appreciated, mul is concerned about his autoimmune problems. He has had a previous umbilical hernia repair at the time of 1 of his scrotal operations. No mesh was placed. At today's office visit he has severe pain in the left groin, less pain in the right groin.     Cl He is instructed to follow-up with 1 of these physicians within the next few weeks. No orders of the defined types were placed in this encounter. Imaging & Referrals   None    Follow Up  Return if symptoms worsen or fail to improve.     Daisy Singh

## 2019-09-12 NOTE — PATIENT INSTRUCTIONS
I am seeing this patient for further surgical consultation regarding severe bilateral inguinal pain. The patient was seen and evaluated a few weeks ago. He has made a late appointment today to evaluate him at the time of his most significant.   Of the d has never had nausea with this pain or vomiting with the pain. Both testes are remaining in place with no previous resections despite his multiple resections of varicoceles, spermatocele, and epididymal cyst.     He does not have any prostate problems. He should return to the attention of urology for any further intervention regarding his pain in the left cord, and bilateral inguinal pain. I told him that he is certainly at risk to develop hernias.   If he gets any swelling beyond the cord, he should r

## 2019-10-25 ENCOUNTER — HOSPITAL ENCOUNTER (OUTPATIENT)
Dept: MRI IMAGING | Age: 64
Discharge: HOME | End: 2019-10-25
Attending: ORTHOPAEDIC SURGERY
Payer: MEDICARE

## 2019-10-25 DIAGNOSIS — M94.262 CHONDROMALACIA OF MEDIAL CONDYLE OF LEFT FEMUR: ICD-10-CM

## 2019-10-25 PROCEDURE — 73721 MRI JNT OF LWR EXTRE W/O DYE: CPT | Performed by: ORTHOPAEDIC SURGERY

## 2020-01-13 ENCOUNTER — LAB ENCOUNTER (OUTPATIENT)
Dept: LAB | Facility: HOSPITAL | Age: 65
End: 2020-01-13
Attending: FAMILY MEDICINE
Payer: MEDICARE

## 2020-01-13 DIAGNOSIS — K11.7 DISTURBANCE OF SALIVARY SECRETION: Primary | ICD-10-CM

## 2020-01-13 DIAGNOSIS — R59.0 BILATERAL HILAR ADENOPATHY SYNDROME: ICD-10-CM

## 2020-01-13 DIAGNOSIS — E55.9 VITAMIN D DEFICIENCY: ICD-10-CM

## 2020-01-13 LAB
ALBUMIN SERPL-MCNC: 3.7 G/DL (ref 3.4–5)
ALBUMIN/GLOB SERPL: 1 {RATIO} (ref 1–2)
ALP LIVER SERPL-CCNC: 62 U/L (ref 45–117)
ALT SERPL-CCNC: 34 U/L (ref 16–61)
ANION GAP SERPL CALC-SCNC: 5 MMOL/L (ref 0–18)
AST SERPL-CCNC: 16 U/L (ref 15–37)
BASOPHILS # BLD AUTO: 0.05 X10(3) UL (ref 0–0.2)
BASOPHILS NFR BLD AUTO: 0.9 %
BILIRUB SERPL-MCNC: 0.9 MG/DL (ref 0.1–2)
BUN BLD-MCNC: 9 MG/DL (ref 7–18)
BUN/CREAT SERPL: 11.1 (ref 10–20)
CALCIUM BLD-MCNC: 9.1 MG/DL (ref 8.5–10.1)
CHLORIDE SERPL-SCNC: 110 MMOL/L (ref 98–112)
CO2 SERPL-SCNC: 29 MMOL/L (ref 21–32)
CREAT BLD-MCNC: 0.81 MG/DL (ref 0.7–1.3)
CRP SERPL-MCNC: <0.29 MG/DL (ref ?–0.3)
DEPRECATED RDW RBC AUTO: 44.7 FL (ref 35.1–46.3)
EOSINOPHIL # BLD AUTO: 0.07 X10(3) UL (ref 0–0.7)
EOSINOPHIL NFR BLD AUTO: 1.3 %
ERYTHROCYTE [DISTWIDTH] IN BLOOD BY AUTOMATED COUNT: 12.2 % (ref 11–15)
GLOBULIN PLAS-MCNC: 3.7 G/DL (ref 2.8–4.4)
GLUCOSE BLD-MCNC: 93 MG/DL (ref 70–99)
HCT VFR BLD AUTO: 46.7 % (ref 39–53)
HGB BLD-MCNC: 15.3 G/DL (ref 13–17.5)
IMM GRANULOCYTES # BLD AUTO: 0.01 X10(3) UL (ref 0–1)
IMM GRANULOCYTES NFR BLD: 0.2 %
LYMPHOCYTES # BLD AUTO: 1.29 X10(3) UL (ref 1–4)
LYMPHOCYTES NFR BLD AUTO: 23.2 %
M PROTEIN MFR SERPL ELPH: 7.4 G/DL (ref 6.4–8.2)
MCH RBC QN AUTO: 32.4 PG (ref 26–34)
MCHC RBC AUTO-ENTMCNC: 32.8 G/DL (ref 31–37)
MCV RBC AUTO: 98.9 FL (ref 80–100)
MONOCYTES # BLD AUTO: 0.58 X10(3) UL (ref 0.1–1)
MONOCYTES NFR BLD AUTO: 10.4 %
NEUTROPHILS # BLD AUTO: 3.56 X10 (3) UL (ref 1.5–7.7)
NEUTROPHILS # BLD AUTO: 3.56 X10(3) UL (ref 1.5–7.7)
NEUTROPHILS NFR BLD AUTO: 64 %
OSMOLALITY SERPL CALC.SUM OF ELEC: 296 MOSM/KG (ref 275–295)
PATIENT FASTING Y/N/NP: YES
PLATELET # BLD AUTO: 166 10(3)UL (ref 150–450)
POTASSIUM SERPL-SCNC: 4.1 MMOL/L (ref 3.5–5.1)
RBC # BLD AUTO: 4.72 X10(6)UL (ref 3.8–5.8)
SED RATE-ML: 11 MM/HR (ref 0–12)
SODIUM SERPL-SCNC: 144 MMOL/L (ref 136–145)
VIT D+METAB SERPL-MCNC: 60.3 NG/ML (ref 30–100)
WBC # BLD AUTO: 5.6 X10(3) UL (ref 4–11)

## 2020-01-13 PROCEDURE — 85025 COMPLETE CBC W/AUTO DIFF WBC: CPT

## 2020-01-13 PROCEDURE — 86038 ANTINUCLEAR ANTIBODIES: CPT

## 2020-01-13 PROCEDURE — 80053 COMPREHEN METABOLIC PANEL: CPT

## 2020-01-13 PROCEDURE — 85652 RBC SED RATE AUTOMATED: CPT

## 2020-01-13 PROCEDURE — 36415 COLL VENOUS BLD VENIPUNCTURE: CPT

## 2020-01-13 PROCEDURE — 86140 C-REACTIVE PROTEIN: CPT

## 2020-01-13 PROCEDURE — 82306 VITAMIN D 25 HYDROXY: CPT

## 2020-01-15 LAB — ANA SCREEN: NEGATIVE

## 2020-01-17 ENCOUNTER — APPOINTMENT (OUTPATIENT)
Dept: LAB | Facility: HOSPITAL | Age: 65
End: 2020-01-17
Attending: INTERNAL MEDICINE
Payer: MEDICARE

## 2020-01-17 ENCOUNTER — OFFICE VISIT (OUTPATIENT)
Dept: RHEUMATOLOGY | Facility: CLINIC | Age: 65
End: 2020-01-17
Payer: MEDICARE

## 2020-01-17 VITALS
RESPIRATION RATE: 18 BRPM | BODY MASS INDEX: 22.28 KG/M2 | HEIGHT: 68 IN | WEIGHT: 147 LBS | DIASTOLIC BLOOD PRESSURE: 58 MMHG | HEART RATE: 64 BPM | SYSTOLIC BLOOD PRESSURE: 102 MMHG

## 2020-01-17 DIAGNOSIS — G89.29 CHRONIC PAIN OF LEFT KNEE: ICD-10-CM

## 2020-01-17 DIAGNOSIS — M35.01 SICCA SYNDROME WITH KERATOCONJUNCTIVITIS (HCC): Primary | ICD-10-CM

## 2020-01-17 DIAGNOSIS — M35.01 SICCA SYNDROME WITH KERATOCONJUNCTIVITIS (HCC): ICD-10-CM

## 2020-01-17 DIAGNOSIS — M25.562 CHRONIC PAIN OF LEFT KNEE: ICD-10-CM

## 2020-01-17 DIAGNOSIS — M17.12 PRIMARY OSTEOARTHRITIS OF LEFT KNEE: ICD-10-CM

## 2020-01-17 PROBLEM — R73.9 HYPERGLYCEMIA: Status: RESOLVED | Noted: 2018-05-09 | Resolved: 2020-01-17

## 2020-01-17 PROBLEM — H16.229 SICCA SYNDROME WITH KERATOCONJUNCTIVITIS: Status: ACTIVE | Noted: 2020-01-17

## 2020-01-17 LAB — RHEUMATOID FACT SERPL-ACNC: <10 IU/ML (ref ?–15)

## 2020-01-17 PROCEDURE — 86162 COMPLEMENT TOTAL (CH50): CPT

## 2020-01-17 PROCEDURE — 86431 RHEUMATOID FACTOR QUANT: CPT

## 2020-01-17 PROCEDURE — 99205 OFFICE O/P NEW HI 60 MIN: CPT | Performed by: INTERNAL MEDICINE

## 2020-01-17 PROCEDURE — 82164 ANGIOTENSIN I ENZYME TEST: CPT

## 2020-01-17 PROCEDURE — 36415 COLL VENOUS BLD VENIPUNCTURE: CPT

## 2020-01-17 NOTE — PROGRESS NOTES
[facility]  Report of Consultation    Sherrye Pain Patient Status:  No patient class for patient encounter    1955 MRN [id number]   Location [address] [place] [address] PCP Rosangela Mehta MD     Date of Consult:  20  Reason for Consultation: Cintia Velazquez Cholecalciferol (VITAMIN D) 2000 units Oral Cap, Take 2,000 Units by mouth daily. , Disp: , Rfl:   •  Multiple Vitamin (TAB-A-TANJA) Oral Tab, Take 1 tablet by mouth daily. , Disp: , Rfl:   •  omega-3 fatty acids (FISH OIL) 1000 MG Oral Cap, Take 1,000 mg by 10/25/2019 MRI left knee shows small effusion with medial compartment degenerative changes, chondral loss associated with horizontal radial tears involving the medial meniscus. 8/21/2019 ultrasound of groin no abnormality noted.     Impression: 71year-old

## 2020-01-17 NOTE — PATIENT INSTRUCTIONS
You have developed Sjogren's syndrome, dry eyes and dry mouth suddenly, it appears to be the result of toxic exposure. Drink plenty of water, use artificial tears, use Biotene products. Consider use of pilocarpine.   Obtain and ultrasound of the neck to r

## 2020-01-19 LAB
ANGIOTENSIN CONVERTING ENZYME: 14 U/L
COMPLEMENT ACTIVITY, TOTAL EIA: 81 CAE UNITS

## 2020-01-20 ENCOUNTER — TELEPHONE (OUTPATIENT)
Dept: RHEUMATOLOGY | Facility: CLINIC | Age: 65
End: 2020-01-20

## 2020-01-20 NOTE — TELEPHONE ENCOUNTER
Test results are negative for sarcoid, rheumatoid factor, and total complement levels. Patient notified. Dr. Ivania Piedra.

## 2020-01-23 ENCOUNTER — TELEPHONE (OUTPATIENT)
Dept: RHEUMATOLOGY | Facility: CLINIC | Age: 65
End: 2020-01-23

## 2020-01-23 ENCOUNTER — HOSPITAL ENCOUNTER (OUTPATIENT)
Dept: ULTRASOUND IMAGING | Facility: HOSPITAL | Age: 65
Discharge: HOME | End: 2020-01-23
Attending: INTERNAL MEDICINE
Payer: MEDICARE

## 2020-01-23 DIAGNOSIS — M35.01 SICCA SYNDROME WITH KERATOCONJUNCTIVITIS (HCC): ICD-10-CM

## 2020-01-23 PROCEDURE — 76536 US EXAM OF HEAD AND NECK: CPT | Performed by: INTERNAL MEDICINE

## 2020-01-24 NOTE — TELEPHONE ENCOUNTER
Time was called. Message left on answering machine at ultrasound of the neck and head showed no enlarged lymph nodes. However there was abnormal texture of the parotid glands which would be consistent with Sjogren's syndrome.   Dr. Sindi Parkinson

## 2020-02-11 ENCOUNTER — OFFICE VISIT (OUTPATIENT)
Dept: RHEUMATOLOGY | Facility: CLINIC | Age: 65
End: 2020-02-11
Payer: MEDICARE

## 2020-02-11 VITALS
HEART RATE: 88 BPM | WEIGHT: 144.81 LBS | SYSTOLIC BLOOD PRESSURE: 108 MMHG | HEIGHT: 68 IN | DIASTOLIC BLOOD PRESSURE: 62 MMHG | RESPIRATION RATE: 18 BRPM | BODY MASS INDEX: 21.95 KG/M2

## 2020-02-11 DIAGNOSIS — M17.12 PRIMARY OSTEOARTHRITIS OF LEFT KNEE: ICD-10-CM

## 2020-02-11 DIAGNOSIS — M35.01 SICCA SYNDROME WITH KERATOCONJUNCTIVITIS (HCC): Primary | ICD-10-CM

## 2020-02-11 DIAGNOSIS — R53.83 FATIGUE, UNSPECIFIED TYPE: ICD-10-CM

## 2020-02-11 PROCEDURE — 99214 OFFICE O/P EST MOD 30 MIN: CPT | Performed by: INTERNAL MEDICINE

## 2020-02-11 NOTE — PROGRESS NOTES
[facility]  Dr. Lelo Song Progress Note     Subjective:   Jefferson Lira is a(n) 71year old male.    Current complaints: Patient presents with:  Sjogren's Syndrome: Here to f/u with lab test and US of head and neck, first here 4 weeks ago to be evalua building. Check T3, T4, TSH levels. I'll call you with results. Exercise regularly. High fiber Medittearrean Diet. Return to office 6 months.        25-minute visit with greater than half the time face-to-face discussing Sjogren's syndrome and treatment

## 2020-02-11 NOTE — PATIENT INSTRUCTIONS
Continue drinking plenty of water. Use Biotene products, lozengers, as needed. Use artificial tears. Another option is to use Pilocarpine pills for dry mouth. See ENT Specialist Dr Arla Simmonds upstairs in my building. Check T3, T4, TSH levels.  I'll elodia

## 2020-02-15 ENCOUNTER — HOSPITAL ENCOUNTER (EMERGENCY)
Facility: HOSPITAL | Age: 65
Discharge: HOME | End: 2020-02-15
Attending: EMERGENCY MEDICINE
Payer: MEDICARE

## 2020-02-15 VITALS
WEIGHT: 140 LBS | RESPIRATION RATE: 16 BRPM | SYSTOLIC BLOOD PRESSURE: 122 MMHG | DIASTOLIC BLOOD PRESSURE: 68 MMHG | OXYGEN SATURATION: 96 % | TEMPERATURE: 98 F | BODY MASS INDEX: 21 KG/M2 | HEART RATE: 81 BPM

## 2020-02-15 DIAGNOSIS — R05.9 COUGH: Primary | ICD-10-CM

## 2020-02-15 PROCEDURE — 94640 AIRWAY INHALATION TREATMENT: CPT

## 2020-02-15 PROCEDURE — 99283 EMERGENCY DEPT VISIT LOW MDM: CPT

## 2020-02-15 RX ORDER — ALBUTEROL SULFATE 90 UG/1
2 AEROSOL, METERED RESPIRATORY (INHALATION) ONCE
Status: COMPLETED | OUTPATIENT
Start: 2020-02-15 | End: 2020-02-15

## 2020-02-15 NOTE — ED INITIAL ASSESSMENT (HPI)
Patient arrives from home with c/o hoarse throat with recent dx of asthma and concerned he may have an asthma attack and does not have and inhaler.

## 2020-02-16 NOTE — ED PROVIDER NOTES
Patient Seen in: [facility] Emergency Department      History   Patient presents with:  Dyspnea PAULY SOB    Stated Complaint: sore throat/cough/pauly    HPI    48-year-old male presents reporting sore throat, dry cough, loss of voice, mild shortness of reviewed and negative except as noted above.     Physical Exam     ED Triage Vitals [02/15/20 1634]   /69   Pulse 77   Resp 20   Temp 98.3 °F (36.8 °C)   Temp src Oral   SpO2 96 %   O2 Device None (Room air)       Current:/69   Pulse 77   Temp 9 an appointment as soon as possible for a visit          Medications Prescribed:  Current Discharge Medication List

## 2020-04-01 ENCOUNTER — TELEPHONE (OUTPATIENT)
Dept: RHEUMATOLOGY | Facility: CLINIC | Age: 65
End: 2020-04-01
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
## 2020-04-03 ENCOUNTER — VIRTUAL PHONE E/M (OUTPATIENT)
Dept: RHEUMATOLOGY | Facility: CLINIC | Age: 65
End: 2020-04-03
Payer: MEDICARE

## 2020-04-03 DIAGNOSIS — M35.01 SICCA SYNDROME WITH KERATOCONJUNCTIVITIS (HCC): Primary | ICD-10-CM

## 2020-04-03 PROCEDURE — G2012 BRIEF CHECK IN BY MD/QHP: HCPCS | Performed by: INTERNAL MEDICINE

## 2020-04-03 NOTE — PROGRESS NOTES
Virtual/Telephone Check-In    Meg Christensen verbally consents to a Virtual/Telephone Check-In service on 04/03/20. Patient understands and accepts financial responsibility for any deductible, co-insurance and/or co-pays associated with this service.

## 2020-04-21 ENCOUNTER — TELEMEDICINE (OUTPATIENT)
Dept: RHEUMATOLOGY | Facility: CLINIC | Age: 65
End: 2020-04-21

## 2020-04-21 VITALS — BODY MASS INDEX: 21.22 KG/M2 | WEIGHT: 140 LBS | HEIGHT: 68 IN

## 2020-04-21 DIAGNOSIS — M17.12 PRIMARY OSTEOARTHRITIS OF LEFT KNEE: ICD-10-CM

## 2020-04-21 DIAGNOSIS — M35.01 SICCA SYNDROME WITH KERATOCONJUNCTIVITIS (HCC): Primary | ICD-10-CM

## 2020-04-21 DIAGNOSIS — M79.641 PAIN IN BOTH HANDS: ICD-10-CM

## 2020-04-21 DIAGNOSIS — M79.642 PAIN IN BOTH HANDS: ICD-10-CM

## 2020-04-21 PROCEDURE — 99213 OFFICE O/P EST LOW 20 MIN: CPT | Performed by: INTERNAL MEDICINE

## 2020-04-21 NOTE — PROGRESS NOTES
Video Check-In/Video Visit    Jefferson Lira verbally consents to  a Video Check-In service on 04/21/20. Patient understands and accepts financial responsibility for any deductible, co-insurance and/or co-pays associated with this service.     Problems S CRP and x-rays hands. Patient should return to office in 3 months for recheck. This was a video visit. 25 minutes in duration. Dr. Mcmullen Every.

## 2020-06-12 ENCOUNTER — TELEPHONE (OUTPATIENT)
Dept: RHEUMATOLOGY | Facility: CLINIC | Age: 65
End: 2020-06-12

## 2020-06-12 DIAGNOSIS — M35.01 SICCA SYNDROME WITH KERATOCONJUNCTIVITIS (HCC): Primary | ICD-10-CM

## 2020-06-12 DIAGNOSIS — R10.84 GENERALIZED ABDOMINAL PAIN: ICD-10-CM

## 2020-06-12 NOTE — TELEPHONE ENCOUNTER
Called patient. Left message on answering machine. He was having abdominal pain. If it severe he should go to urgent care, or ER or call his family doctor. If is mild and due to constipation he might take a laxative and drink plenty of fluid.   If it is

## 2020-06-12 NOTE — TELEPHONE ENCOUNTER
Pt called to inform you that he has been having lower abdominal pain that has been getting worse. He has tried probiotics and it hasnt helped. Wondering what he can do to get relief.

## 2020-06-12 NOTE — TELEPHONE ENCOUNTER
Discussed patient's abdominal pain with him. Having abdominal pain now for several months thinks it might be tied in with Sjogren's.   I suggested that he see his GI specialist Dr. Gretchen Philip who he seen in the past for colonoscopy and discuss it with him

## 2020-06-16 DIAGNOSIS — R19.4 ALTERED BOWEL HABITS: Primary | ICD-10-CM

## 2020-06-19 ENCOUNTER — LAB ENCOUNTER (OUTPATIENT)
Dept: LAB | Facility: HOSPITAL | Age: 65
End: 2020-06-19
Attending: INTERNAL MEDICINE
Payer: MEDICARE

## 2020-06-19 DIAGNOSIS — M17.12 PRIMARY OSTEOARTHRITIS OF LEFT KNEE: ICD-10-CM

## 2020-06-19 DIAGNOSIS — R53.83 FATIGUE, UNSPECIFIED TYPE: ICD-10-CM

## 2020-06-19 DIAGNOSIS — N40.0 BENIGN ENLARGEMENT OF PROSTATE: ICD-10-CM

## 2020-06-19 DIAGNOSIS — M35.01 SICCA SYNDROME WITH KERATOCONJUNCTIVITIS (HCC): ICD-10-CM

## 2020-06-19 DIAGNOSIS — R63.4 LOSS OF WEIGHT: Primary | ICD-10-CM

## 2020-06-19 PROCEDURE — 81001 URINALYSIS AUTO W/SCOPE: CPT

## 2020-06-19 PROCEDURE — 36415 COLL VENOUS BLD VENIPUNCTURE: CPT

## 2020-06-19 PROCEDURE — 85025 COMPLETE CBC W/AUTO DIFF WBC: CPT

## 2020-06-19 PROCEDURE — 82150 ASSAY OF AMYLASE: CPT

## 2020-06-19 PROCEDURE — 80053 COMPREHEN METABOLIC PANEL: CPT

## 2020-06-19 PROCEDURE — 83690 ASSAY OF LIPASE: CPT

## 2020-06-19 PROCEDURE — 84439 ASSAY OF FREE THYROXINE: CPT

## 2020-06-19 PROCEDURE — 84480 ASSAY TRIIODOTHYRONINE (T3): CPT

## 2020-06-19 PROCEDURE — 84443 ASSAY THYROID STIM HORMONE: CPT

## 2020-07-07 ENCOUNTER — TELEMEDICINE (OUTPATIENT)
Dept: RHEUMATOLOGY | Facility: CLINIC | Age: 65
End: 2020-07-07
Payer: MEDICARE

## 2020-07-07 VITALS — BODY MASS INDEX: 21.22 KG/M2 | WEIGHT: 140 LBS | HEIGHT: 68 IN

## 2020-07-07 DIAGNOSIS — M35.01 SICCA SYNDROME WITH KERATOCONJUNCTIVITIS (HCC): Primary | ICD-10-CM

## 2020-07-07 DIAGNOSIS — M51.36 DEGENERATIVE LUMBAR DISC: ICD-10-CM

## 2020-07-07 PROCEDURE — 99442 PHONE E/M BY PHYS 11-20 MIN: CPT | Performed by: INTERNAL MEDICINE

## 2020-07-07 NOTE — PROGRESS NOTES
[facility]  Dr. Queenie Sanderson Progress Note  Phone Call. Subjective:   Ángela Zamora is a(n) 71year old male.    Current complaints: Patient presents with:  Sjogren's Syndrome: est pt, Pt has been suffering at night, wakes up in the middle night with minute phone visit. Video was never established.   Phil Hairston MD 3/4/6124 0:23 PM

## 2020-07-07 NOTE — PATIENT INSTRUCTIONS
Continue to drink plenty of water. Use artificial tears prn. Use restasis tears twice a day. Trial of pilocarpine 5 mg at night. For more aggressive treatment of Sjogren's syndrome we could try Plaquenil 200 mg twice a day.   No major side effects fro

## 2020-08-03 ENCOUNTER — HOSPITAL ENCOUNTER (EMERGENCY)
Facility: HOSPITAL | Age: 65
Discharge: HOME | End: 2020-08-03
Attending: EMERGENCY MEDICINE
Payer: MEDICARE

## 2020-08-03 VITALS
DIASTOLIC BLOOD PRESSURE: 66 MMHG | RESPIRATION RATE: 18 BRPM | WEIGHT: 132 LBS | TEMPERATURE: 98 F | SYSTOLIC BLOOD PRESSURE: 118 MMHG | HEIGHT: 68 IN | HEART RATE: 66 BPM | BODY MASS INDEX: 20 KG/M2 | OXYGEN SATURATION: 98 %

## 2020-08-03 DIAGNOSIS — R10.30 LOWER ABDOMINAL PAIN: Primary | ICD-10-CM

## 2020-08-03 LAB
ALBUMIN SERPL-MCNC: 3.7 G/DL (ref 3.4–5)
ALBUMIN/GLOB SERPL: 1 {RATIO} (ref 1–2)
ALP LIVER SERPL-CCNC: 65 U/L (ref 45–117)
ALT SERPL-CCNC: 30 U/L (ref 16–61)
ANION GAP SERPL CALC-SCNC: 1 MMOL/L (ref 0–18)
AST SERPL-CCNC: 24 U/L (ref 15–37)
BASOPHILS # BLD AUTO: 0.06 X10(3) UL (ref 0–0.2)
BASOPHILS NFR BLD AUTO: 0.8 %
BILIRUB SERPL-MCNC: 0.4 MG/DL (ref 0.1–2)
BILIRUB UR QL STRIP.AUTO: NEGATIVE
BUN BLD-MCNC: 17 MG/DL (ref 7–18)
BUN/CREAT SERPL: 17.2 (ref 10–20)
CALCIUM BLD-MCNC: 9.4 MG/DL (ref 8.5–10.1)
CHLORIDE SERPL-SCNC: 108 MMOL/L (ref 98–112)
CLARITY UR REFRACT.AUTO: CLEAR
CO2 SERPL-SCNC: 30 MMOL/L (ref 21–32)
COLOR UR AUTO: COLORLESS
CREAT BLD-MCNC: 0.99 MG/DL (ref 0.7–1.3)
DEPRECATED RDW RBC AUTO: 46.3 FL (ref 35.1–46.3)
EOSINOPHIL # BLD AUTO: 0.07 X10(3) UL (ref 0–0.7)
EOSINOPHIL NFR BLD AUTO: 1 %
ERYTHROCYTE [DISTWIDTH] IN BLOOD BY AUTOMATED COUNT: 12.9 % (ref 11–15)
GLOBULIN PLAS-MCNC: 3.7 G/DL (ref 2.8–4.4)
GLUCOSE BLD-MCNC: 106 MG/DL (ref 70–99)
GLUCOSE UR STRIP.AUTO-MCNC: NEGATIVE MG/DL
HCT VFR BLD AUTO: 44 % (ref 39–53)
HGB BLD-MCNC: 14.6 G/DL (ref 13–17.5)
IMM GRANULOCYTES # BLD AUTO: 0.02 X10(3) UL (ref 0–1)
IMM GRANULOCYTES NFR BLD: 0.3 %
KETONES UR STRIP.AUTO-MCNC: NEGATIVE MG/DL
LEUKOCYTE ESTERASE UR QL STRIP.AUTO: NEGATIVE
LYMPHOCYTES # BLD AUTO: 1.16 X10(3) UL (ref 1–4)
LYMPHOCYTES NFR BLD AUTO: 16 %
M PROTEIN MFR SERPL ELPH: 7.4 G/DL (ref 6.4–8.2)
MCH RBC QN AUTO: 32.4 PG (ref 26–34)
MCHC RBC AUTO-ENTMCNC: 33.2 G/DL (ref 31–37)
MCV RBC AUTO: 97.6 FL (ref 80–100)
MONOCYTES # BLD AUTO: 0.68 X10(3) UL (ref 0.1–1)
MONOCYTES NFR BLD AUTO: 9.4 %
NEUTROPHILS # BLD AUTO: 5.24 X10 (3) UL (ref 1.5–7.7)
NEUTROPHILS # BLD AUTO: 5.24 X10(3) UL (ref 1.5–7.7)
NEUTROPHILS NFR BLD AUTO: 72.5 %
NITRITE UR QL STRIP.AUTO: NEGATIVE
OSMOLALITY SERPL CALC.SUM OF ELEC: 290 MOSM/KG (ref 275–295)
PH UR STRIP.AUTO: 7 [PH] (ref 4.5–8)
PLATELET # BLD AUTO: 161 10(3)UL (ref 150–450)
POTASSIUM SERPL-SCNC: 4.1 MMOL/L (ref 3.5–5.1)
PROT UR STRIP.AUTO-MCNC: NEGATIVE MG/DL
RBC # BLD AUTO: 4.51 X10(6)UL (ref 3.8–5.8)
RBC UR QL AUTO: NEGATIVE
SODIUM SERPL-SCNC: 139 MMOL/L (ref 136–145)
SP GR UR STRIP.AUTO: <1.005 (ref 1–1.03)
UROBILINOGEN UR STRIP.AUTO-MCNC: <2 MG/DL
WBC # BLD AUTO: 7.2 X10(3) UL (ref 4–11)

## 2020-08-03 PROCEDURE — 81003 URINALYSIS AUTO W/O SCOPE: CPT

## 2020-08-03 PROCEDURE — 81003 URINALYSIS AUTO W/O SCOPE: CPT | Performed by: EMERGENCY MEDICINE

## 2020-08-03 PROCEDURE — 99283 EMERGENCY DEPT VISIT LOW MDM: CPT

## 2020-08-03 PROCEDURE — 36415 COLL VENOUS BLD VENIPUNCTURE: CPT

## 2020-08-03 PROCEDURE — 80053 COMPREHEN METABOLIC PANEL: CPT | Performed by: PHYSICIAN ASSISTANT

## 2020-08-03 PROCEDURE — 85025 COMPLETE CBC W/AUTO DIFF WBC: CPT | Performed by: PHYSICIAN ASSISTANT

## 2020-08-03 NOTE — ED PROVIDER NOTES
Patient Seen in: [facility] Emergency Department      History   Patient presents with:   Other  Abdomen/Flank Pain    Stated Complaint: pt states putting on 4 lbs in last hour, pt concerned about kidney    HPI    CHIEF COMPLAINT: Weight gain    HISTO unspecified hyperlipidemia    • PONV (postoperative nausea and vomiting)    • Shortness of breath    • Visual impairment               Past Surgical History:   Procedure Laterality Date   • COLONOSCOPY  1/2015   • COLONOSCOPY N/A 1/20/2015    Performed by distention. Neurological:  Grossly intact, no deficits. Skin: warm and dry, no rashes. Musculoskeletal:  neck is supple non tender.  no meningeal signs        Extremities are symmetrical, full range of motion  Psychiatric: there is no agitation    ED [facility] [address] written discharge and follow-up instructions were provided to help prevent relapse or worsening. I discussed the case with the patient and they had no questions, complaints, or concerns.   Patient states they understand diagnosis, followup plan and agree

## 2020-08-03 NOTE — ED INITIAL ASSESSMENT (HPI)
Patient sts he is scheduled for an MRI tomorrow of his pelvis and abd. Patient sts he's been weight himself the past 4 days and feels he gained weight in the last 2 hours. Patient sts he feels he has fluid in his abd.

## 2020-08-03 NOTE — ED PROVIDER NOTES
Patient Seen in: [facility] Emergency Department      History   Patient presents with:   Other  Abdomen/Flank Pain    Stated Complaint: pt states putting on 4 lbs in last hour, pt concerned about kidney    HPI    62-year-old white male presents [facility]    Positive for stated complaint: pt states putting on 4 lbs in last hour, pt concerned about kidney  Other systems are as noted in HPI. Constitutional and vital signs reviewed. All other systems reviewed and negative except as noted above. -----------         ------                     CBC W/ DIFFERENTIAL[[id number]]                              Final result                 Please view results for these tests on the individual orders.    [facility]

## 2020-08-04 ENCOUNTER — HOSPITAL ENCOUNTER (OUTPATIENT)
Dept: MRI IMAGING | Facility: HOSPITAL | Age: 65
Discharge: HOME | End: 2020-08-04
Attending: INTERNAL MEDICINE
Payer: MEDICARE

## 2020-08-04 DIAGNOSIS — R19.8 ALTERED BOWEL FUNCTION: ICD-10-CM

## 2020-08-04 DIAGNOSIS — R10.84 GENERALIZED ABDOMINAL PAIN: ICD-10-CM

## 2020-08-04 PROCEDURE — 74183 MRI ABD W/O CNTR FLWD CNTR: CPT | Performed by: INTERNAL MEDICINE

## 2020-08-04 PROCEDURE — A9575 INJ GADOTERATE MEGLUMI 0.1ML: HCPCS | Performed by: RADIOLOGY

## 2020-08-04 PROCEDURE — 72197 MRI PELVIS W/O & W/DYE: CPT | Performed by: INTERNAL MEDICINE

## 2020-09-14 ENCOUNTER — OFFICE VISIT (OUTPATIENT)
Dept: RHEUMATOLOGY | Facility: CLINIC | Age: 65
End: 2020-09-14
Payer: MEDICARE

## 2020-09-14 DIAGNOSIS — K59.04 CHRONIC IDIOPATHIC CONSTIPATION: ICD-10-CM

## 2020-09-14 DIAGNOSIS — M35.01 SICCA SYNDROME WITH KERATOCONJUNCTIVITIS (HCC): Primary | ICD-10-CM

## 2020-09-14 PROBLEM — D69.6 THROMBOCYTOPENIA: Status: RESOLVED | Noted: 2018-05-09 | Resolved: 2020-09-14

## 2020-09-14 PROBLEM — D69.6 THROMBOCYTOPENIA (HCC): Status: RESOLVED | Noted: 2018-05-09 | Resolved: 2020-09-14

## 2020-09-14 PROBLEM — R79.89 AZOTEMIA: Status: RESOLVED | Noted: 2018-05-09 | Resolved: 2020-09-14

## 2020-09-14 PROCEDURE — 99213 OFFICE O/P EST LOW 20 MIN: CPT | Performed by: INTERNAL MEDICINE

## 2020-09-14 NOTE — PROGRESS NOTES
This visit is conducted using Telemedicine with live, interactive video and audio. Patient has been referred to the [facility] website at [email or web address] to review the yearly Consent to Treat document.     Patient understands and accepts financial res Sicca syndrome with keratoconjunctivitis (hcc)  (primary encounter diagnosis)  Chronic idiopathic constipation  Plan:   Patient Instructions   Continue to drink plenty of water. Use artificial tears.     Drink coffee or green tea for the caffeine for fat

## 2020-09-14 NOTE — PATIENT INSTRUCTIONS
Continue to drink plenty of water. Use artificial tears. Drink coffee or green tea for the caffeine for fatigue. Well balanced diet. Do protein shakes/homemade ones ok. Mild exercise. Use sorbitol lozengers. Use hot packs prn on eyes.     See eye

## 2020-10-10 ENCOUNTER — HOSPITAL ENCOUNTER (EMERGENCY)
Facility: HOSPITAL | Age: 65
Discharge: HOME | End: 2020-10-10
Attending: EMERGENCY MEDICINE
Payer: MEDICARE

## 2020-10-10 ENCOUNTER — APPOINTMENT (OUTPATIENT)
Dept: MRI IMAGING | Facility: HOSPITAL | Age: 65
End: 2020-10-10
Attending: EMERGENCY MEDICINE
Payer: MEDICARE

## 2020-10-10 VITALS
RESPIRATION RATE: 16 BRPM | SYSTOLIC BLOOD PRESSURE: 122 MMHG | HEIGHT: 68 IN | DIASTOLIC BLOOD PRESSURE: 69 MMHG | TEMPERATURE: 97 F | HEART RATE: 67 BPM | OXYGEN SATURATION: 98 % | WEIGHT: 134 LBS | BODY MASS INDEX: 20.31 KG/M2

## 2020-10-10 DIAGNOSIS — H53.9 VISUAL CHANGES: Primary | ICD-10-CM

## 2020-10-10 PROCEDURE — 85025 COMPLETE CBC W/AUTO DIFF WBC: CPT | Performed by: EMERGENCY MEDICINE

## 2020-10-10 PROCEDURE — 99284 EMERGENCY DEPT VISIT MOD MDM: CPT

## 2020-10-10 PROCEDURE — 36415 COLL VENOUS BLD VENIPUNCTURE: CPT

## 2020-10-10 PROCEDURE — 80053 COMPREHEN METABOLIC PANEL: CPT | Performed by: EMERGENCY MEDICINE

## 2020-10-10 PROCEDURE — 70551 MRI BRAIN STEM W/O DYE: CPT | Performed by: EMERGENCY MEDICINE

## 2020-10-15 ENCOUNTER — LAB ENCOUNTER (OUTPATIENT)
Dept: LAB | Facility: HOSPITAL | Age: 65
End: 2020-10-15
Attending: FAMILY MEDICINE
Payer: MEDICARE

## 2020-10-15 DIAGNOSIS — H53.129 TRANSIENT VISUAL LOSS: Primary | ICD-10-CM

## 2020-10-15 PROCEDURE — 86141 C-REACTIVE PROTEIN HS: CPT

## 2020-10-15 PROCEDURE — 85652 RBC SED RATE AUTOMATED: CPT

## 2020-10-15 PROCEDURE — 80053 COMPREHEN METABOLIC PANEL: CPT

## 2020-10-15 PROCEDURE — 36415 COLL VENOUS BLD VENIPUNCTURE: CPT

## 2020-10-20 ENCOUNTER — HOSPITAL ENCOUNTER (OUTPATIENT)
Dept: ULTRASOUND IMAGING | Age: 65
Discharge: HOME | End: 2020-10-20
Attending: FAMILY MEDICINE
Payer: MEDICARE

## 2020-10-20 DIAGNOSIS — Z86.73 PERSONAL HISTORY OF TRANSIENT CEREBRAL ISCHEMIA: ICD-10-CM

## 2020-10-20 PROCEDURE — 93880 EXTRACRANIAL BILAT STUDY: CPT | Performed by: FAMILY MEDICINE

## 2020-11-10 ENCOUNTER — NURSE ONLY (OUTPATIENT)
Dept: RHEUMATOLOGY | Facility: CLINIC | Age: 65
End: 2020-11-10
Payer: MEDICARE

## 2020-11-10 VITALS — TEMPERATURE: 98 F

## 2020-11-10 DIAGNOSIS — D89.9 IMMUNE DISORDER (HCC): ICD-10-CM

## 2020-11-10 DIAGNOSIS — M17.12 PRIMARY OSTEOARTHRITIS OF LEFT KNEE: ICD-10-CM

## 2020-11-10 DIAGNOSIS — M35.01 SICCA SYNDROME WITH KERATOCONJUNCTIVITIS (HCC): Primary | ICD-10-CM

## 2020-11-10 DIAGNOSIS — Z23 NEED FOR IMMUNIZATION AGAINST INFLUENZA: ICD-10-CM

## 2020-11-10 DIAGNOSIS — M35.01 SJOGREN'S SYNDROME WITH KERATOCONJUNCTIVITIS SICCA (HCC): ICD-10-CM

## 2020-11-10 DIAGNOSIS — Z86.2 H/O AUTOIMMUNE DISORDER: ICD-10-CM

## 2020-11-10 PROCEDURE — G0008 ADMIN INFLUENZA VIRUS VAC: HCPCS | Performed by: INTERNAL MEDICINE

## 2020-11-10 PROCEDURE — 90662 IIV NO PRSV INCREASED AG IM: CPT | Performed by: INTERNAL MEDICINE

## 2020-11-10 NOTE — PROGRESS NOTES
Pt here for flu vaccine today. Screening paperwork completed. Vaccine administered. Pt discharged without incident. VIS given.

## 2020-11-19 ENCOUNTER — TELEPHONE (OUTPATIENT)
Dept: RHEUMATOLOGY | Facility: CLINIC | Age: 65
End: 2020-11-19

## 2020-12-07 ENCOUNTER — TELEPHONE (OUTPATIENT)
Dept: CARDIOLOGY | Age: 65
End: 2020-12-07

## 2020-12-08 ENCOUNTER — APPOINTMENT (OUTPATIENT)
Dept: CARDIOLOGY | Age: 65
End: 2020-12-08

## 2020-12-08 PROBLEM — M35.00 SJOGREN'S DISEASE (CMD): Status: ACTIVE | Noted: 2019-12-23

## 2020-12-09 RX ORDER — VITAMIN A 2400 MCG
250 CAPSULE ORAL DAILY
COMMUNITY

## 2020-12-09 RX ORDER — NICOTINE 14MG/24HR
250 PATCH, TRANSDERMAL 24 HOURS TRANSDERMAL DAILY
COMMUNITY

## 2020-12-09 RX ORDER — CHLORAL HYDRATE 500 MG
1000 CAPSULE ORAL DAILY
COMMUNITY

## 2020-12-09 RX ORDER — AMPICILLIN TRIHYDRATE 250 MG
500 CAPSULE ORAL DAILY
COMMUNITY

## 2020-12-10 ENCOUNTER — OFFICE VISIT (OUTPATIENT)
Dept: CARDIOLOGY | Age: 65
End: 2020-12-10

## 2020-12-10 VITALS
HEART RATE: 68 BPM | HEIGHT: 68 IN | WEIGHT: 134 LBS | DIASTOLIC BLOOD PRESSURE: 60 MMHG | BODY MASS INDEX: 20.31 KG/M2 | SYSTOLIC BLOOD PRESSURE: 116 MMHG

## 2020-12-10 DIAGNOSIS — E78.2 MIXED HYPERLIPIDEMIA: Primary | ICD-10-CM

## 2020-12-10 DIAGNOSIS — R60.9 EDEMA, UNSPECIFIED TYPE: ICD-10-CM

## 2020-12-10 DIAGNOSIS — M35.00 SJOGREN'S SYNDROME, WITH UNSPECIFIED ORGAN INVOLVEMENT (CMD): ICD-10-CM

## 2020-12-10 PROCEDURE — 99204 OFFICE O/P NEW MOD 45 MIN: CPT | Performed by: INTERNAL MEDICINE

## 2020-12-10 SDOH — HEALTH STABILITY: PHYSICAL HEALTH: ON AVERAGE, HOW MANY DAYS PER WEEK DO YOU ENGAGE IN MODERATE TO STRENUOUS EXERCISE (LIKE A BRISK WALK)?: 0 DAYS

## 2020-12-10 SDOH — HEALTH STABILITY: MENTAL HEALTH: HOW OFTEN DO YOU HAVE A DRINK CONTAINING ALCOHOL?: NEVER

## 2020-12-10 ASSESSMENT — PATIENT HEALTH QUESTIONNAIRE - PHQ9
2. FEELING DOWN, DEPRESSED OR HOPELESS: NOT AT ALL
CLINICAL INTERPRETATION OF PHQ2 SCORE: NO FURTHER SCREENING NEEDED
1. LITTLE INTEREST OR PLEASURE IN DOING THINGS: NOT AT ALL
SUM OF ALL RESPONSES TO PHQ9 QUESTIONS 1 AND 2: 0
CLINICAL INTERPRETATION OF PHQ9 SCORE: NO FURTHER SCREENING NEEDED
SUM OF ALL RESPONSES TO PHQ9 QUESTIONS 1 AND 2: 0

## 2020-12-10 ASSESSMENT — ENCOUNTER SYMPTOMS
WEIGHT GAIN: 0
ALLERGIC/IMMUNOLOGIC COMMENTS: NO NEW FOOD ALLERGIES
COUGH: 0
BRUISES/BLEEDS EASILY: 0
HEMOPTYSIS: 0
WEIGHT LOSS: 0
SUSPICIOUS LESIONS: 0
CHILLS: 0
FEVER: 0
HEMATOCHEZIA: 0

## 2020-12-16 ENCOUNTER — LAB ENCOUNTER (OUTPATIENT)
Dept: LAB | Facility: HOSPITAL | Age: 65
End: 2020-12-16
Attending: FAMILY MEDICINE
Payer: MEDICARE

## 2020-12-16 ENCOUNTER — HOSPITAL ENCOUNTER (OUTPATIENT)
Dept: CV DIAGNOSTICS | Facility: HOSPITAL | Age: 65
Discharge: HOME | End: 2020-12-16
Attending: INTERNAL MEDICINE
Payer: MEDICARE

## 2020-12-16 DIAGNOSIS — R60.9 EDEMA, UNSPECIFIED TYPE: ICD-10-CM

## 2020-12-16 DIAGNOSIS — R63.4 ABNORMAL WEIGHT LOSS: Primary | ICD-10-CM

## 2020-12-16 DIAGNOSIS — E78.2 MIXED HYPERLIPIDEMIA: ICD-10-CM

## 2020-12-16 DIAGNOSIS — M35.00 SJOGREN'S SYNDROME, WITH UNSPECIFIED ORGAN INVOLVEMENT (HCC): ICD-10-CM

## 2020-12-16 PROCEDURE — 80053 COMPREHEN METABOLIC PANEL: CPT

## 2020-12-16 PROCEDURE — 83880 ASSAY OF NATRIURETIC PEPTIDE: CPT

## 2020-12-16 PROCEDURE — 84443 ASSAY THYROID STIM HORMONE: CPT

## 2020-12-16 PROCEDURE — 36415 COLL VENOUS BLD VENIPUNCTURE: CPT

## 2020-12-16 PROCEDURE — 93306 TTE W/DOPPLER COMPLETE: CPT | Performed by: INTERNAL MEDICINE

## 2020-12-16 PROCEDURE — 85025 COMPLETE CBC W/AUTO DIFF WBC: CPT

## 2020-12-16 PROCEDURE — 81003 URINALYSIS AUTO W/O SCOPE: CPT

## 2020-12-17 ENCOUNTER — TELEMEDICINE (OUTPATIENT)
Dept: RHEUMATOLOGY | Facility: CLINIC | Age: 65
End: 2020-12-17
Payer: MEDICARE

## 2020-12-17 VITALS — SYSTOLIC BLOOD PRESSURE: 105 MMHG | DIASTOLIC BLOOD PRESSURE: 70 MMHG

## 2020-12-17 DIAGNOSIS — I83.93 SPIDER VEINS OF BOTH LOWER EXTREMITIES: ICD-10-CM

## 2020-12-17 DIAGNOSIS — M35.01 SICCA SYNDROME WITH KERATOCONJUNCTIVITIS (HCC): Primary | ICD-10-CM

## 2020-12-17 DIAGNOSIS — M25.472 ANKLE EDEMA, BILATERAL: ICD-10-CM

## 2020-12-17 DIAGNOSIS — M25.471 ANKLE EDEMA, BILATERAL: ICD-10-CM

## 2020-12-17 PROCEDURE — 99213 OFFICE O/P EST LOW 20 MIN: CPT | Performed by: INTERNAL MEDICINE

## 2020-12-17 NOTE — PATIENT INSTRUCTIONS
For Sjogren's syndrome drink plenty of water, use artificial tears for the eyes, and use Biotene products also for the dry mouth. For ankle edema see Dr. Mary Zavala of [facility].   Your recent heart echo did show some mild dilatation of the right atrium,

## 2020-12-17 NOTE — PROGRESS NOTES
This visit is conducted using Telemedicine with live, interactive video and audio. Patient has been referred to the [facility] website at [email or web address] to review the yearly Consent to Treat document.     Patient understands and accepts financial res BNP of 221 normal is less than 125. This might be related with mild heart dysfunction. Talk with Dr. Edilma Christina the cardiologist about that. Continue to eat your low salt diet. Continue to elevate your legs when resting. Continue to exercise regularly.

## 2020-12-18 ENCOUNTER — TELEPHONE (OUTPATIENT)
Dept: CARDIOLOGY | Age: 65
End: 2020-12-18

## 2020-12-22 ENCOUNTER — APPOINTMENT (OUTPATIENT)
Dept: CARDIOLOGY | Age: 65
End: 2020-12-22
Attending: INTERNAL MEDICINE

## 2020-12-23 ENCOUNTER — TELEPHONE (OUTPATIENT)
Dept: CARDIOLOGY | Age: 65
End: 2020-12-23

## 2020-12-23 ENCOUNTER — OFFICE VISIT (OUTPATIENT)
Dept: CARDIOLOGY | Age: 65
End: 2020-12-23

## 2020-12-23 VITALS
SYSTOLIC BLOOD PRESSURE: 118 MMHG | WEIGHT: 138 LBS | HEIGHT: 68 IN | DIASTOLIC BLOOD PRESSURE: 60 MMHG | BODY MASS INDEX: 20.92 KG/M2 | HEART RATE: 72 BPM

## 2020-12-23 DIAGNOSIS — E78.2 MIXED HYPERLIPIDEMIA: Primary | ICD-10-CM

## 2020-12-23 DIAGNOSIS — R60.9 EDEMA, UNSPECIFIED TYPE: ICD-10-CM

## 2020-12-23 PROCEDURE — 99214 OFFICE O/P EST MOD 30 MIN: CPT | Performed by: INTERNAL MEDICINE

## 2020-12-23 SDOH — HEALTH STABILITY: PHYSICAL HEALTH: ON AVERAGE, HOW MANY MINUTES DO YOU ENGAGE IN EXERCISE AT THIS LEVEL?: 30 MIN

## 2020-12-23 SDOH — HEALTH STABILITY: PHYSICAL HEALTH: ON AVERAGE, HOW MANY DAYS PER WEEK DO YOU ENGAGE IN MODERATE TO STRENUOUS EXERCISE (LIKE A BRISK WALK)?: 7 DAYS

## 2020-12-23 SDOH — HEALTH STABILITY: MENTAL HEALTH: HOW OFTEN DO YOU HAVE A DRINK CONTAINING ALCOHOL?: NEVER

## 2020-12-23 ASSESSMENT — PATIENT HEALTH QUESTIONNAIRE - PHQ9
2. FEELING DOWN, DEPRESSED OR HOPELESS: NOT AT ALL
CLINICAL INTERPRETATION OF PHQ9 SCORE: NO FURTHER SCREENING NEEDED
SUM OF ALL RESPONSES TO PHQ9 QUESTIONS 1 AND 2: 0
SUM OF ALL RESPONSES TO PHQ9 QUESTIONS 1 AND 2: 0
CLINICAL INTERPRETATION OF PHQ2 SCORE: NO FURTHER SCREENING NEEDED
1. LITTLE INTEREST OR PLEASURE IN DOING THINGS: NOT AT ALL

## 2020-12-23 ASSESSMENT — ENCOUNTER SYMPTOMS
SUSPICIOUS LESIONS: 0
EYES NEGATIVE: 1
ALLERGIC/IMMUNOLOGIC COMMENTS: NO NEW FOOD ALLERGIES
HEMOPTYSIS: 0
BRUISES/BLEEDS EASILY: 0
WEIGHT LOSS: 0
ENDOCRINE NEGATIVE: 1
FEVER: 0
COUGH: 0
WEIGHT GAIN: 0
CHILLS: 0
HEMATOCHEZIA: 0

## 2021-01-01 ENCOUNTER — EXTERNAL RECORD (OUTPATIENT)
Dept: OTHER | Age: 66
End: 2021-01-01

## 2021-03-02 ENCOUNTER — APPOINTMENT (OUTPATIENT)
Dept: MRI IMAGING | Facility: HOSPITAL | Age: 66
End: 2021-03-02
Attending: PHYSICIAN ASSISTANT
Payer: MEDICARE

## 2021-03-02 ENCOUNTER — APPOINTMENT (OUTPATIENT)
Dept: CT IMAGING | Facility: HOSPITAL | Age: 66
End: 2021-03-02
Attending: EMERGENCY MEDICINE
Payer: MEDICARE

## 2021-03-02 ENCOUNTER — HOSPITAL ENCOUNTER (OUTPATIENT)
Facility: HOSPITAL | Age: 66
Setting detail: OBSERVATION
Discharge: HOME | End: 2021-03-03
Attending: EMERGENCY MEDICINE | Admitting: HOSPITALIST
Payer: MEDICARE

## 2021-03-02 DIAGNOSIS — R51.9 NONINTRACTABLE HEADACHE, UNSPECIFIED CHRONICITY PATTERN, UNSPECIFIED HEADACHE TYPE: ICD-10-CM

## 2021-03-02 DIAGNOSIS — R47.01 EXPRESSIVE APHASIA: Primary | ICD-10-CM

## 2021-03-02 LAB
ALBUMIN SERPL-MCNC: 3.9 G/DL (ref 3.4–5)
ALBUMIN/GLOB SERPL: 1.1 {RATIO} (ref 1–2)
ALP LIVER SERPL-CCNC: 61 U/L
ALT SERPL-CCNC: 39 U/L
ANION GAP SERPL CALC-SCNC: 6 MMOL/L (ref 0–18)
APTT PPP: 30.7 SECONDS (ref 25.4–36.1)
AST SERPL-CCNC: 24 U/L (ref 15–37)
ATRIAL RATE: 65 BPM
BASOPHILS # BLD AUTO: 0.05 X10(3) UL (ref 0–0.2)
BASOPHILS NFR BLD AUTO: 0.7 %
BILIRUB SERPL-MCNC: 0.5 MG/DL (ref 0.1–2)
BUN BLD-MCNC: 11 MG/DL (ref 7–18)
BUN/CREAT SERPL: 13.1 (ref 10–20)
CALCIUM BLD-MCNC: 9.2 MG/DL (ref 8.5–10.1)
CHLORIDE SERPL-SCNC: 103 MMOL/L (ref 98–112)
CO2 SERPL-SCNC: 27 MMOL/L (ref 21–32)
CREAT BLD-MCNC: 0.84 MG/DL
DEPRECATED RDW RBC AUTO: 44.6 FL (ref 35.1–46.3)
EOSINOPHIL # BLD AUTO: 0.11 X10(3) UL (ref 0–0.7)
EOSINOPHIL NFR BLD AUTO: 1.6 %
ERYTHROCYTE [DISTWIDTH] IN BLOOD BY AUTOMATED COUNT: 12.4 % (ref 11–15)
EST. AVERAGE GLUCOSE BLD GHB EST-MCNC: 117 MG/DL (ref 68–126)
ETHANOL SERPL-MCNC: <3 MG/DL (ref ?–3)
GLOBULIN PLAS-MCNC: 3.6 G/DL (ref 2.8–4.4)
GLUCOSE BLD-MCNC: 107 MG/DL (ref 70–99)
GLUCOSE BLD-MCNC: 233 MG/DL (ref 70–99)
GLUCOSE BLD-MCNC: 233 MG/DL (ref 70–99)
GLUCOSE BLD-MCNC: 86 MG/DL (ref 70–99)
GLUCOSE BLD-MCNC: 99 MG/DL (ref 70–99)
HBA1C MFR BLD HPLC: 5.7 % (ref ?–5.7)
HCT VFR BLD AUTO: 44.3 %
HGB BLD-MCNC: 15.1 G/DL
IMM GRANULOCYTES # BLD AUTO: 0.02 X10(3) UL (ref 0–1)
IMM GRANULOCYTES NFR BLD: 0.3 %
INR BLD: 1.1 (ref 0.89–1.11)
LYMPHOCYTES # BLD AUTO: 1.3 X10(3) UL (ref 1–4)
LYMPHOCYTES NFR BLD AUTO: 18.8 %
M PROTEIN MFR SERPL ELPH: 7.5 G/DL (ref 6.4–8.2)
MCH RBC QN AUTO: 33.1 PG (ref 26–34)
MCHC RBC AUTO-ENTMCNC: 34.1 G/DL (ref 31–37)
MCV RBC AUTO: 97.1 FL
MONOCYTES # BLD AUTO: 0.75 X10(3) UL (ref 0.1–1)
MONOCYTES NFR BLD AUTO: 10.9 %
NEUTROPHILS # BLD AUTO: 4.67 X10 (3) UL (ref 1.5–7.7)
NEUTROPHILS # BLD AUTO: 4.67 X10(3) UL (ref 1.5–7.7)
NEUTROPHILS NFR BLD AUTO: 67.7 %
OSMOLALITY SERPL CALC.SUM OF ELEC: 281 MOSM/KG (ref 275–295)
P AXIS: 77 DEGREES
P-R INTERVAL: 276 MS
PLATELET # BLD AUTO: 174 10(3)UL (ref 150–450)
POTASSIUM SERPL-SCNC: 4 MMOL/L (ref 3.5–5.1)
PSA SERPL DL<=0.01 NG/ML-MCNC: 14.5 SECONDS (ref 12.4–14.6)
Q-T INTERVAL: 402 MS
QRS DURATION: 102 MS
QTC CALCULATION (BEZET): 418 MS
R AXIS: 14 DEGREES
RBC # BLD AUTO: 4.56 X10(6)UL
SARS-COV-2 RNA RESP QL NAA+PROBE: NOT DETECTED
SODIUM SERPL-SCNC: 136 MMOL/L (ref 136–145)
T AXIS: 64 DEGREES
VENTRICULAR RATE: 65 BPM
WBC # BLD AUTO: 6.9 X10(3) UL (ref 4–11)

## 2021-03-02 PROCEDURE — 70498 CT ANGIOGRAPHY NECK: CPT | Performed by: EMERGENCY MEDICINE

## 2021-03-02 PROCEDURE — 99219 INITIAL OBSERVATION CARE,LEVL II: CPT | Performed by: HOSPITALIST

## 2021-03-02 PROCEDURE — 70450 CT HEAD/BRAIN W/O DYE: CPT | Performed by: EMERGENCY MEDICINE

## 2021-03-02 PROCEDURE — 70496 CT ANGIOGRAPHY HEAD: CPT | Performed by: EMERGENCY MEDICINE

## 2021-03-02 PROCEDURE — 70551 MRI BRAIN STEM W/O DYE: CPT | Performed by: PHYSICIAN ASSISTANT

## 2021-03-02 RX ORDER — DOCUSATE SODIUM 100 MG/1
100 CAPSULE, LIQUID FILLED ORAL 2 TIMES DAILY PRN
Status: DISCONTINUED | OUTPATIENT
Start: 2021-03-02 | End: 2021-03-03

## 2021-03-02 RX ORDER — ASPIRIN 300 MG
300 SUPPOSITORY, RECTAL RECTAL DAILY
Status: DISCONTINUED | OUTPATIENT
Start: 2021-03-03 | End: 2021-03-03

## 2021-03-02 RX ORDER — LABETALOL HYDROCHLORIDE 5 MG/ML
10 INJECTION, SOLUTION INTRAVENOUS EVERY 10 MIN PRN
Status: DISCONTINUED | OUTPATIENT
Start: 2021-03-02 | End: 2021-03-03

## 2021-03-02 RX ORDER — ASPIRIN 325 MG
325 TABLET ORAL DAILY
Status: DISCONTINUED | OUTPATIENT
Start: 2021-03-03 | End: 2021-03-03

## 2021-03-02 RX ORDER — ACETAMINOPHEN 650 MG/1
650 SUPPOSITORY RECTAL EVERY 4 HOURS PRN
Status: DISCONTINUED | OUTPATIENT
Start: 2021-03-02 | End: 2021-03-03

## 2021-03-02 RX ORDER — ASPIRIN 81 MG/1
324 TABLET, CHEWABLE ORAL ONCE
Status: COMPLETED | OUTPATIENT
Start: 2021-03-02 | End: 2021-03-02

## 2021-03-02 RX ORDER — METOCLOPRAMIDE HYDROCHLORIDE 5 MG/ML
10 INJECTION INTRAMUSCULAR; INTRAVENOUS EVERY 8 HOURS PRN
Status: DISCONTINUED | OUTPATIENT
Start: 2021-03-02 | End: 2021-03-03

## 2021-03-02 RX ORDER — ONDANSETRON 2 MG/ML
4 INJECTION INTRAMUSCULAR; INTRAVENOUS EVERY 6 HOURS PRN
Status: DISCONTINUED | OUTPATIENT
Start: 2021-03-02 | End: 2021-03-03

## 2021-03-02 RX ORDER — ACETAMINOPHEN 325 MG/1
650 TABLET ORAL EVERY 4 HOURS PRN
Status: DISCONTINUED | OUTPATIENT
Start: 2021-03-02 | End: 2021-03-03

## 2021-03-02 RX ORDER — SODIUM CHLORIDE 9 MG/ML
INJECTION, SOLUTION INTRAVENOUS CONTINUOUS
Status: ACTIVE | OUTPATIENT
Start: 2021-03-02 | End: 2021-03-02

## 2021-03-02 RX ORDER — ATORVASTATIN CALCIUM 40 MG/1
40 TABLET, FILM COATED ORAL NIGHTLY
Status: DISCONTINUED | OUTPATIENT
Start: 2021-03-02 | End: 2021-03-03

## 2021-03-02 RX ORDER — SODIUM CHLORIDE 9 MG/ML
INJECTION, SOLUTION INTRAVENOUS CONTINUOUS
Status: DISCONTINUED | OUTPATIENT
Start: 2021-03-02 | End: 2021-03-03

## 2021-03-02 RX ORDER — HYDRALAZINE HYDROCHLORIDE 20 MG/ML
10 INJECTION INTRAMUSCULAR; INTRAVENOUS EVERY 2 HOUR PRN
Status: DISCONTINUED | OUTPATIENT
Start: 2021-03-02 | End: 2021-03-03

## 2021-03-02 NOTE — ED PROVIDER NOTES
Patient Seen in: [facility] Emergency Department      History   Patient presents with:  Altered Mental Status  Headache    Stated Complaint: Confusion and headache     HPI/Subjective:   HPI    Patient is a 77-year-old male who within the hour had an MENICUS REPAIR, R AND L SHOULDER ROTATOR CUFF  REPAIR   • OTHER SURGICAL HISTORY  80's    L varicocelectomy   • OTHER SURGICAL HISTORY  90's    R hydrocelectomy   • OTHER SURGICAL HISTORY      L&R Shoulder                Social History    Tobacco Use strength is 5 over 5 in all 4 extremities. There are no gross motor or sensory deficits appreciated. Cranial nerves II through XII are intact. Patient answering all questions appropriately.              ED Course     Labs Reviewed   POCT GLUCOSE - Abnormal hours on 3/2/2021. Critical results were read back.    Dictated by (CST): Jose Alberto Harris MD on 3/02/2021 at 11:56 AM     Finalized by (CST): Jose Alberto Harris MD on 3/02/2021 at 12:20 PM              MDM      12:40 patient sitting back and breathing easi Prescribed:  Current Discharge Medication List                          Present on Admission  Date Reviewed: 2/10/2021          ICD-10-CM Noted POA    Expressive aphasia R47.01 3/2/2021 Unknown

## 2021-03-02 NOTE — CODE DOCUMENTATION
Called to B8/B8 at [phone] for Code Stroke. Met patient in [place] en route to CT at 1139. Upon arrival found patient with NIH of 0. Last Known Normal at 1030 per patient lkn was approx an hour and a half prior to arrival in ED; he drove himself.   Patient spangler

## 2021-03-02 NOTE — CONSULTS
[id number] [facility] Neurology Note    [address] Patient Status:  Emergency    1955 MRN [id number]   Location [facility] Attending Jaxon Hunt MD   Hosp Day # 0 PCP Keesha Giraldo MD     REASON FOR EVALUATION: confu Visual impairment        PAST SURGICAL HISTORY:  Past Surgical History:   Procedure Laterality Date   • COLONOSCOPY  1/2015   • COLONOSCOPY N/A 1/20/2015    Performed by Michael Swanson MD at [facility] ENDOSCOPY   • HERNIA SURGERY      umbilical hernia   • OTHER edema or ecchymoses  PSYCHIATRIC: mild anxiety, appropriate mood and affect    NEUROLOGICAL:   Mental status: Oriented to person, place, and time.   Feels at baseline metal status  Speech: Fluent, no dysarthria  Memory and comprehension: Intact, follows com

## 2021-03-02 NOTE — H&P
[facility]  History and Physical     Sheba Snowball Patient Status:  Emergency    1955 MRN [id number]   Location [address] [facility] Attending Doroteo Almaguer MD   Hosp Day # 0 PCP Axel Prasad MD     Chief Complaint: Dani Zimmerman ROTATOR CUFF  REPAIR   • OTHER SURGICAL HISTORY  80's    L varicocelectomy   • OTHER SURGICAL HISTORY  90's    R hydrocelectomy   • OTHER SURGICAL HISTORY      L&R Shoulder       Social History:  reports that he has never smoked.  He has never used smokeles nondistended. Positive bowel sounds. No rebound, guarding or organomegaly. Neurologic: No focal neurological deficits. CNII-XII grossly intact. Musculoskeletal: Moves all extremities. Extremities: No edema or cyanosis.   Integument: No rashes or lesions

## 2021-03-02 NOTE — ED INITIAL ASSESSMENT (HPI)
Pt c/o expressive aphasia and being forgetful x one hour PTA, states also had difficulty comprehending what he was reading, also c/o mild HA.

## 2021-03-03 ENCOUNTER — APPOINTMENT (OUTPATIENT)
Dept: CV DIAGNOSTICS | Facility: HOSPITAL | Age: 66
End: 2021-03-03
Attending: PHYSICIAN ASSISTANT
Payer: MEDICARE

## 2021-03-03 VITALS
HEART RATE: 59 BPM | SYSTOLIC BLOOD PRESSURE: 101 MMHG | BODY MASS INDEX: 21.22 KG/M2 | HEIGHT: 68 IN | WEIGHT: 140 LBS | RESPIRATION RATE: 19 BRPM | TEMPERATURE: 98 F | DIASTOLIC BLOOD PRESSURE: 64 MMHG | OXYGEN SATURATION: 99 %

## 2021-03-03 LAB
ANION GAP SERPL CALC-SCNC: 3 MMOL/L (ref 0–18)
BASOPHILS # BLD AUTO: 0.05 X10(3) UL (ref 0–0.2)
BASOPHILS NFR BLD AUTO: 0.8 %
BUN BLD-MCNC: 14 MG/DL (ref 7–18)
BUN/CREAT SERPL: 17.5 (ref 10–20)
CALCIUM BLD-MCNC: 8.4 MG/DL (ref 8.5–10.1)
CHLORIDE SERPL-SCNC: 112 MMOL/L (ref 98–112)
CHOLEST SMN-MCNC: 94 MG/DL (ref ?–200)
CO2 SERPL-SCNC: 29 MMOL/L (ref 21–32)
CREAT BLD-MCNC: 0.8 MG/DL
DEPRECATED RDW RBC AUTO: 44.5 FL (ref 35.1–46.3)
EOSINOPHIL # BLD AUTO: 0.17 X10(3) UL (ref 0–0.7)
EOSINOPHIL NFR BLD AUTO: 2.8 %
ERYTHROCYTE [DISTWIDTH] IN BLOOD BY AUTOMATED COUNT: 12.3 % (ref 11–15)
GLUCOSE BLD-MCNC: 122 MG/DL (ref 70–99)
GLUCOSE BLD-MCNC: 89 MG/DL (ref 70–99)
GLUCOSE BLD-MCNC: 94 MG/DL (ref 70–99)
HCT VFR BLD AUTO: 40 %
HDLC SERPL-MCNC: 28 MG/DL (ref 40–59)
HGB BLD-MCNC: 13.5 G/DL
IMM GRANULOCYTES # BLD AUTO: 0.02 X10(3) UL (ref 0–1)
IMM GRANULOCYTES NFR BLD: 0.3 %
LDLC SERPL CALC-MCNC: 53 MG/DL (ref ?–100)
LYMPHOCYTES # BLD AUTO: 1.26 X10(3) UL (ref 1–4)
LYMPHOCYTES NFR BLD AUTO: 20.8 %
MCH RBC QN AUTO: 33.2 PG (ref 26–34)
MCHC RBC AUTO-ENTMCNC: 33.8 G/DL (ref 31–37)
MCV RBC AUTO: 98.3 FL
MONOCYTES # BLD AUTO: 0.83 X10(3) UL (ref 0.1–1)
MONOCYTES NFR BLD AUTO: 13.7 %
NEUTROPHILS # BLD AUTO: 3.74 X10 (3) UL (ref 1.5–7.7)
NEUTROPHILS # BLD AUTO: 3.74 X10(3) UL (ref 1.5–7.7)
NEUTROPHILS NFR BLD AUTO: 61.6 %
NONHDLC SERPL-MCNC: 66 MG/DL (ref ?–130)
OSMOLALITY SERPL CALC.SUM OF ELEC: 298 MOSM/KG (ref 275–295)
PLATELET # BLD AUTO: 147 10(3)UL (ref 150–450)
POTASSIUM SERPL-SCNC: 3.8 MMOL/L (ref 3.5–5.1)
RBC # BLD AUTO: 4.07 X10(6)UL
SODIUM SERPL-SCNC: 144 MMOL/L (ref 136–145)
TRIGL SERPL-MCNC: 64 MG/DL (ref 30–149)
VLDLC SERPL CALC-MCNC: 13 MG/DL (ref 0–30)
WBC # BLD AUTO: 6.1 X10(3) UL (ref 4–11)

## 2021-03-03 PROCEDURE — B246ZZZ ULTRASONOGRAPHY OF RIGHT AND LEFT HEART: ICD-10-PCS | Performed by: INTERNAL MEDICINE

## 2021-03-03 PROCEDURE — 93306 TTE W/DOPPLER COMPLETE: CPT | Performed by: PHYSICIAN ASSISTANT

## 2021-03-03 PROCEDURE — 99204 OFFICE O/P NEW MOD 45 MIN: CPT | Performed by: OTHER

## 2021-03-03 PROCEDURE — 4A10X4Z MONITORING OF CENTRAL NERVOUS ELECTRICAL ACTIVITY, EXTERNAL APPROACH: ICD-10-PCS | Performed by: OTHER

## 2021-03-03 PROCEDURE — 99217 OBSERVATION CARE DISCHARGE: CPT | Performed by: HOSPITALIST

## 2021-03-03 PROCEDURE — 95816 EEG AWAKE AND DROWSY: CPT | Performed by: OTHER

## 2021-03-03 RX ORDER — ASPIRIN 81 MG/1
81 TABLET ORAL DAILY
Qty: 30 TABLET | Refills: 2 | Status: SHIPPED | OUTPATIENT
Start: 2021-03-03

## 2021-03-03 RX ORDER — POTASSIUM CHLORIDE 20 MEQ/1
40 TABLET, EXTENDED RELEASE ORAL ONCE
Status: COMPLETED | OUTPATIENT
Start: 2021-03-03 | End: 2021-03-03

## 2021-03-03 RX ORDER — POTASSIUM CHLORIDE 20 MEQ/1
40 TABLET, EXTENDED RELEASE ORAL ONCE
Status: DISCONTINUED | OUTPATIENT
Start: 2021-03-03 | End: 2021-03-03

## 2021-03-03 NOTE — PROCEDURES
Date of Procedure: 3/3/2021    Procedure: EEG (ELECTROENCEPHALOGRAM)     DX: EXPRESSIVE APHASIA  HX: PT IS A 75 YO MALE WHO PRESENTS FOR WORD FINDING DIFFICULTY. OCULAR MIGRAINES WHICH AFFECTS HIS EYES.  HEADACHE AND VISUAL DISTURBANCE LASTS ABOUT 30 MINUTE

## 2021-03-03 NOTE — PHYSICAL THERAPY NOTE
PHYSICAL THERAPY EVALUATION - INPATIENT     Room Number: 3036/3668-T  Evaluation Date: 3/3/2021  Type of Evaluation: Initial  Physician Order: PT Eval and Treat    Presenting Problem: Expressive aphasia, headache  Reason for Therapy: Mobility Dysfunc independent w/ adl's, gait w/o device, drives and reports he is retired. St he has not been out of the house much due to Joni.       SUBJECTIVE  \"I'm very worried about catching COVID because of my newly diagnosed auto-immune disease\"    Patient self-st Approx Degree of Impairment: 0%   Standardized Score (AM-PAC Scale): 61.14   CMS Modifier (G-Code): CH    FUNCTIONAL ABILITY STATUS  Gait Assessment   Gait Assistance: Modified independent  Distance (ft): 250  Assistive Device: None  Pattern: Within [employer] Based on this evaluation, patient's clinical presentation is stable and overall evaluation complexity is considered low. PLAN  Patient has been evaluated and presents with no skilled Physical Therapy needs at this time.   Patient discharged from Physical T

## 2021-03-03 NOTE — OCCUPATIONAL THERAPY NOTE
OCCUPATIONAL THERAPY QUICK EVALUATION - INPATIENT    Room Number: 6012/2337-H  Evaluation Date: 3/3/2021     Type of Evaluation: Quick Eval  Presenting Problem: AMS, headache, word finding difficulty     Physician Order: IP Consult to Occupational Therapy intubation   • Extrinsic asthma, unspecified    • Osteoarthritis    • Other and unspecified hyperlipidemia    • PONV (postoperative nausea and vomiting)    • Shortness of breath    • Visual impairment        Past Surgical History  Past Surgical History: Symmetrical  Coordination - Finger Opposition: Symmetrical       ACTIVITY TOLERANCE                         O2 SATURATIONS                ACTIVITIES OF DAILY LIVING ASSESSMENT  AM-PAC ‘6-Clicks’ Inpatient Daily Activity Short Form   How much help from anot Patient Complexity  Occupational Profile/Medical History  LOW - Brief history including review of medical or therapy records    Specific performance deficits impacting engagement in ADL/IADL  LOW  1 - 3 performance deficits    Client Assessment/Perform

## 2021-03-03 NOTE — PLAN OF CARE
NURSING ADMISSION NOTE      Patient admitted via Wheelchair  Oriented to room. Safety precautions initiated. Bed in low position. Call light in reach. Admission navigator completed with patient. Oriented to room and unit. Discussed plan of care.

## 2021-03-03 NOTE — PLAN OF CARE
Assumed care at 1. Denies pain/discomfort. Neuro checks q2h until 0200, then q4h. No deficits noted. See charting for full assessment. MRI completed his evening.   Plan for echo with bubbles      Problem: Patient/Family Goals  Goal: Patient/Family Boaz

## 2021-03-03 NOTE — CM/SW NOTE
Pt is a 77 yo male admitted for expressive aphasia. Pt lives alone. Pt on CVA protocol. PT to see. No dc needs anticipated.      03/03/21 1400   CM/SW Referral Data   Referral Source Physician   Reason for Referral Protocol order set   Specify order set

## 2021-03-03 NOTE — PROGRESS NOTES
Patient seen and examined. Medically clear to discharge today. Confusion episode  MRI neg. EEG today.   Home later    Rebecca Alvarez MD

## 2021-03-03 NOTE — PLAN OF CARE
NURSING DISCHARGE NOTE    Discharged Home via Wheelchair. Accompanied by RN  Belongings Taken by patient/family. Pt alert and oriented x4, neuro intact. Denies HA. EEG neg, echo normal. Ok to dc per neuro. IV removed.  Dc instructions and f/u appts d

## 2021-03-03 NOTE — SLP NOTE
ADULT SWALLOWING EVALUATION    ASSESSMENT    ASSESSMENT/OVERALL IMPRESSION:  Orders were received for a bedside swallowing evaluation per stroke protocol.  Patient with a history of ocular migraines, asthma and Sjogrens presents with difficulty with word fi HISTORY  Reason for Referral: Stroke protocol    Problem List  Principal Problem:    Expressive aphasia  Active Problems:    Nonintractable headache, unspecified chronicity pattern, unspecified headache type      Past Medical History  Past Medical History: warranted as patient with a functional oropharyngeal swallow free of clinical s/s of aspiration. No evidence of speech/language deficits.      FOLLOW UP  Treatment Plan/Recommendations: No further inpatient SLP service warranted     Follow Up Needed: No

## 2021-03-06 NOTE — DISCHARGE SUMMARY
[facility]  DISCHARGE SUMMARY     Donna Lewis Patient Status:  Observation    1955 MRN [id number]   [facility] 7NE-A Attending No att. providers found   Hosp Day # 0 PCP Eda Mcneal MD     Date of Admission: 3/2/2021  Date o hospital.      Consultants:  • neurology    Discharge Medication List:     Discharge Medications      START taking these medications      Instructions Prescription details   aspirin 81 MG Tbec      Take 1 tablet (81 mg total) by mouth daily.    Quantity: 30 JVD  Abdomen: Soft, nontender, nondistended. Positive bowel sounds.     -----------------------------------------------------------------------------------------------  PATIENT DISCHARGE INSTRUCTIONS: See electronic chart    Bubba Powers MD    Time spent

## 2021-03-31 ENCOUNTER — VIRTUAL PHONE E/M (OUTPATIENT)
Dept: NEUROLOGY | Facility: CLINIC | Age: 66
End: 2021-03-31
Payer: MEDICARE

## 2021-03-31 DIAGNOSIS — R47.01 EXPRESSIVE APHASIA: Primary | ICD-10-CM

## 2021-03-31 PROCEDURE — 1111F DSCHRG MED/CURRENT MED MERGE: CPT | Performed by: OTHER

## 2021-03-31 PROCEDURE — 99441 PHONE E/M BY PHYS 5-10 MIN: CPT | Performed by: OTHER

## 2021-03-31 NOTE — PROGRESS NOTES
Virtual/Telephone Visit Note     Date of service: 3/31/2021    Luna Urbina verbally consents to a Virtual/Telephone Visit service on 3/31/2021.   Patient understands and accepts financial responsibility for any deductible, co-insurance and/or co-pays a previous difficulties with intubation   • Extrinsic asthma, unspecified    • Osteoarthritis    • Other and unspecified hyperlipidemia    • PONV (postoperative nausea and vomiting)    • Shortness of breath    • Visual impairment      Past Surgical History: time.  This billing was spent on reviewing labs, medications, radiology tests and decision making. Appropriate medical decision-making and tests are ordered as detailed in the plan of care above.     Leo Kenney MD   Neurology  Lauro Dickson

## 2021-04-21 ENCOUNTER — OFFICE VISIT (OUTPATIENT)
Dept: RHEUMATOLOGY | Facility: CLINIC | Age: 66
End: 2021-04-21
Payer: MEDICARE

## 2021-04-21 VITALS
DIASTOLIC BLOOD PRESSURE: 55 MMHG | HEIGHT: 68 IN | WEIGHT: 140 LBS | SYSTOLIC BLOOD PRESSURE: 110 MMHG | BODY MASS INDEX: 21.22 KG/M2 | TEMPERATURE: 97 F | HEART RATE: 77 BPM

## 2021-04-21 DIAGNOSIS — M35.01 SICCA SYNDROME WITH KERATOCONJUNCTIVITIS (HCC): Primary | ICD-10-CM

## 2021-04-21 DIAGNOSIS — I83.93 SPIDER VEINS OF BOTH LOWER EXTREMITIES: ICD-10-CM

## 2021-04-21 DIAGNOSIS — R20.2 NUMBNESS AND TINGLING OF BOTH FEET: ICD-10-CM

## 2021-04-21 DIAGNOSIS — R20.0 NUMBNESS AND TINGLING OF BOTH FEET: ICD-10-CM

## 2021-04-21 DIAGNOSIS — G31.9 CEREBRAL ATROPHY, MILD (HCC): ICD-10-CM

## 2021-04-21 PROCEDURE — 99214 OFFICE O/P EST MOD 30 MIN: CPT | Performed by: INTERNAL MEDICINE

## 2021-04-21 RX ORDER — MELATONIN
1000 DAILY
COMMUNITY

## 2021-04-21 NOTE — PATIENT INSTRUCTIONS
Continue to wear compression stockings. Spider veins can lead to venous insufficiency. Drink plenty of water for dry mouth. Use dry mouth lozengers. Use artificial tears as needed. Avoid salt in the diet. Walk daily for exercise.   Gabapentin is

## 2021-04-21 NOTE — PROGRESS NOTES
[facility]  Dr. Horacio Addison Progress Note     Subjective:   Sam Cantu is a(n) 76year old male. Current complaints: Patient presents with: Follow - Up: Virtual visit 12/17/20, has not been doing very good.  Having numbness in his feet and swellin (hcc)  Spider veins of both lower extremities  Plan:   Patient Instructions   Continue to wear compression stockings. Spider veins can lead to venous insufficiency. Drink plenty of water for dry mouth. Use dry mouth lozengers.   Use artificial tears a

## 2021-04-30 ENCOUNTER — OFFICE VISIT (OUTPATIENT)
Dept: SURGERY | Facility: CLINIC | Age: 66
End: 2021-04-30
Payer: MEDICARE

## 2021-04-30 VITALS — DIASTOLIC BLOOD PRESSURE: 64 MMHG | TEMPERATURE: 98 F | HEART RATE: 67 BPM | SYSTOLIC BLOOD PRESSURE: 120 MMHG

## 2021-04-30 DIAGNOSIS — N40.0 ENLARGED PROSTATE: ICD-10-CM

## 2021-04-30 DIAGNOSIS — R35.0 FREQUENCY OF MICTURITION: ICD-10-CM

## 2021-04-30 DIAGNOSIS — R82.90 URINE FINDING: Primary | ICD-10-CM

## 2021-04-30 DIAGNOSIS — R39.15 URGENCY OF MICTURITION: ICD-10-CM

## 2021-04-30 PROCEDURE — 99203 OFFICE O/P NEW LOW 30 MIN: CPT | Performed by: UROLOGY

## 2021-04-30 PROCEDURE — 76857 US EXAM PELVIC LIMITED: CPT | Performed by: UROLOGY

## 2021-04-30 PROCEDURE — 81003 URINALYSIS AUTO W/O SCOPE: CPT | Performed by: UROLOGY

## 2021-04-30 NOTE — PROGRESS NOTES
Rooming Clinician:     Di Neff is a 76year old male. Patient presents with:  Consult: prostate check, c/o urinary frequency.  Nocturia 3-7 times/noc  BPH  Stream: strong  Daytime frequency: sometimes 3-4 times/hr  Straining to urinate: No    Empt Oral Tab Take 1 tablet by mouth daily. • omega-3 fatty acids (FISH OIL) 1000 MG Oral Cap Take 1,000 mg by mouth daily. • Cristin, Zingiber officinalis, 250 MG Oral Cap Take 1 tablet by mouth daily.        • Cinnamon 500 MG Oral Tab Take 500 mg by jacques motion without distress  CARDIO: normal peripheral perfusion  ABDOMEN: no masses,  tenderness, or hernia  : The penis is circumcised. Urethral meatus is patent without discharge. The testicles are descended bilaterally and are normal shape and size.   Mariluz Zuniga

## 2021-05-03 NOTE — PROGRESS NOTES
Your recent urine cytology is normal.  Recommend follow up in the office as directed.     Sincerely,  Nidia Cat MD

## 2021-05-04 ENCOUNTER — TELEPHONE (OUTPATIENT)
Dept: SURGERY | Facility: CLINIC | Age: 66
End: 2021-05-04

## 2021-05-06 NOTE — TELEPHONE ENCOUNTER
RN called patient in response to his call:     \"Pt calling for results. please advise \"            Your recent urine cytology is normal.  Recommend follow up in the office as directed. RN relayed that the result is normal. All questions answered.  Re

## 2021-05-21 ENCOUNTER — TELEPHONE (OUTPATIENT)
Dept: SURGERY | Facility: CLINIC | Age: 66
End: 2021-05-21

## 2021-05-21 NOTE — TELEPHONE ENCOUNTER
RN called patient in response to call:     \"Pt needs to reschedule uroflow and cysto appt. Please call thank you. \"     No answer. Left message to call back.   RN to offer 6/17 Thursday at 9:30AM

## 2021-06-07 ENCOUNTER — TELEPHONE (OUTPATIENT)
Dept: RHEUMATOLOGY | Facility: CLINIC | Age: 66
End: 2021-06-07

## 2021-06-07 NOTE — TELEPHONE ENCOUNTER
Pain with neuropathy, numbness and tingling in hands and feet. Possibly due to Sjogren's. Refer to neurology for evaluation.

## 2021-06-07 NOTE — TELEPHONE ENCOUNTER
Phoned pt, very concerned about his neuropathy. Numbness and tingling to his feet and hands. Would like answers what could be the root cause, Would like this treated before starting any medication.  LOV with Dr. Mcmullen Every mentioned gabapentin for neuropathy, p

## 2021-06-07 NOTE — TELEPHONE ENCOUNTER
Having neuropathy started in left foot and now in right foot and hands. Numbness and burning at night.  Didn't know how to treat or if he should see another specialist.

## 2021-06-20 ENCOUNTER — HOSPITAL ENCOUNTER (EMERGENCY)
Facility: HOSPITAL | Age: 66
Discharge: HOME | End: 2021-06-20
Attending: EMERGENCY MEDICINE
Payer: MEDICARE

## 2021-06-20 VITALS
DIASTOLIC BLOOD PRESSURE: 70 MMHG | HEART RATE: 56 BPM | TEMPERATURE: 99 F | BODY MASS INDEX: 20.46 KG/M2 | SYSTOLIC BLOOD PRESSURE: 114 MMHG | HEIGHT: 68 IN | OXYGEN SATURATION: 98 % | RESPIRATION RATE: 14 BRPM | WEIGHT: 135 LBS

## 2021-06-20 DIAGNOSIS — R51.9 NONINTRACTABLE EPISODIC HEADACHE, UNSPECIFIED HEADACHE TYPE: Primary | ICD-10-CM

## 2021-06-20 DIAGNOSIS — R68.84 JAW PAIN: ICD-10-CM

## 2021-06-20 PROCEDURE — 99284 EMERGENCY DEPT VISIT MOD MDM: CPT

## 2021-06-20 PROCEDURE — 80053 COMPREHEN METABOLIC PANEL: CPT | Performed by: EMERGENCY MEDICINE

## 2021-06-20 PROCEDURE — 85652 RBC SED RATE AUTOMATED: CPT | Performed by: EMERGENCY MEDICINE

## 2021-06-20 PROCEDURE — 93005 ELECTROCARDIOGRAM TRACING: CPT

## 2021-06-20 PROCEDURE — 83880 ASSAY OF NATRIURETIC PEPTIDE: CPT | Performed by: EMERGENCY MEDICINE

## 2021-06-20 PROCEDURE — 85025 COMPLETE CBC W/AUTO DIFF WBC: CPT | Performed by: EMERGENCY MEDICINE

## 2021-06-20 PROCEDURE — 96374 THER/PROPH/DIAG INJ IV PUSH: CPT

## 2021-06-20 PROCEDURE — 93010 ELECTROCARDIOGRAM REPORT: CPT

## 2021-06-20 PROCEDURE — 84484 ASSAY OF TROPONIN QUANT: CPT | Performed by: EMERGENCY MEDICINE

## 2021-06-20 RX ORDER — KETOROLAC TROMETHAMINE 30 MG/ML
15 INJECTION, SOLUTION INTRAMUSCULAR; INTRAVENOUS ONCE
Status: COMPLETED | OUTPATIENT
Start: 2021-06-20 | End: 2021-06-20

## 2021-06-20 NOTE — ED INITIAL ASSESSMENT (HPI)
Pt states awoke this morning with left jaw pain and mild HA, also states woke up in the middle of the night sweating. C/o fatigue x one week. Denies c/o chest pain. States has a h/o shogrins disease.

## 2021-06-21 NOTE — ED PROVIDER NOTES
Patient Seen in: [facility] Emergency Department      History   Patient presents with:  Headache  Pain    Stated Complaint: jaw pain, headache     HPI/Subjective:   HPI    78-year-old male with history of hypertension, high cholesterol, Sjogren's, p Drug use: No             Review of Systems    Positive for stated complaint: jaw pain, headache   Other systems are as noted in HPI. Constitutional and vital signs reviewed. All other systems reviewed and negative except as noted above.     Physical E components:    Pro-Beta Natriuretic Peptide 161 (*)     All other components within normal limits   TROPONIN I - Normal   SED RATE, WESTERGREN (AUTOMATED) - Normal   CBC WITH DIFFERENTIAL WITH PLATELET    Narrative:      The following orders were created fo good condition                             Disposition and Plan     Clinical Impression:  Nonintractable episodic headache, unspecified headache type  (primary encounter diagnosis)  Jaw pain     Disposition:  Discharge  6/20/2021  8:08 pm    Follow-up:  Sachi Hartman

## 2021-06-23 ENCOUNTER — APPOINTMENT (OUTPATIENT)
Dept: CARDIOLOGY | Age: 66
End: 2021-06-23

## 2021-06-23 ENCOUNTER — TELEPHONE (OUTPATIENT)
Dept: CARDIOLOGY | Age: 66
End: 2021-06-23

## 2021-06-23 ENCOUNTER — OFFICE VISIT (OUTPATIENT)
Dept: CARDIOLOGY | Age: 66
End: 2021-06-23

## 2021-06-23 VITALS
OXYGEN SATURATION: 99 % | SYSTOLIC BLOOD PRESSURE: 117 MMHG | RESPIRATION RATE: 16 BRPM | DIASTOLIC BLOOD PRESSURE: 67 MMHG | WEIGHT: 136.3 LBS | HEIGHT: 68 IN | HEART RATE: 80 BPM | BODY MASS INDEX: 20.66 KG/M2

## 2021-06-23 DIAGNOSIS — R07.89 CHEST DISCOMFORT: ICD-10-CM

## 2021-06-23 DIAGNOSIS — E78.2 MIXED HYPERLIPIDEMIA: Primary | ICD-10-CM

## 2021-06-23 DIAGNOSIS — R06.02 SHORTNESS OF BREATH: ICD-10-CM

## 2021-06-23 DIAGNOSIS — R60.9 EDEMA, UNSPECIFIED TYPE: ICD-10-CM

## 2021-06-23 DIAGNOSIS — M35.00 SJOGREN'S SYNDROME, WITH UNSPECIFIED ORGAN INVOLVEMENT (CMD): ICD-10-CM

## 2021-06-23 DIAGNOSIS — R07.2 PRECORDIAL PAIN: ICD-10-CM

## 2021-06-23 PROCEDURE — 99214 OFFICE O/P EST MOD 30 MIN: CPT | Performed by: INTERNAL MEDICINE

## 2021-06-23 PROCEDURE — 93000 ELECTROCARDIOGRAM COMPLETE: CPT | Performed by: INTERNAL MEDICINE

## 2021-06-23 RX ORDER — ASPIRIN 81 MG/1
81 TABLET ORAL DAILY
COMMUNITY
Start: 2021-03-03

## 2021-06-23 ASSESSMENT — PATIENT HEALTH QUESTIONNAIRE - PHQ9
2. FEELING DOWN, DEPRESSED OR HOPELESS: NOT AT ALL
SUM OF ALL RESPONSES TO PHQ9 QUESTIONS 1 AND 2: 0
1. LITTLE INTEREST OR PLEASURE IN DOING THINGS: NOT AT ALL
CLINICAL INTERPRETATION OF PHQ9 SCORE: NO FURTHER SCREENING NEEDED
CLINICAL INTERPRETATION OF PHQ2 SCORE: NO FURTHER SCREENING NEEDED
SUM OF ALL RESPONSES TO PHQ9 QUESTIONS 1 AND 2: 0

## 2021-06-24 ENCOUNTER — TELEPHONE (OUTPATIENT)
Dept: CARDIOLOGY | Age: 66
End: 2021-06-24

## 2021-06-24 ENCOUNTER — LAB ENCOUNTER (OUTPATIENT)
Dept: LAB | Facility: HOSPITAL | Age: 66
End: 2021-06-24
Attending: FAMILY MEDICINE
Payer: MEDICARE

## 2021-06-24 ENCOUNTER — ORDER TRANSCRIPTION (OUTPATIENT)
Dept: ADMINISTRATIVE | Facility: HOSPITAL | Age: 66
End: 2021-06-24

## 2021-06-24 DIAGNOSIS — R20.2 PARESTHESIA: Primary | ICD-10-CM

## 2021-06-24 DIAGNOSIS — I10 ESSENTIAL HYPERTENSION, MALIGNANT: ICD-10-CM

## 2021-06-24 DIAGNOSIS — Z01.812 ENCOUNTER FOR PREPROCEDURE SCREENING LABORATORY TESTING FOR COVID-19: Primary | ICD-10-CM

## 2021-06-24 DIAGNOSIS — R06.02 SHORTNESS OF BREATH: ICD-10-CM

## 2021-06-24 DIAGNOSIS — R07.2 PRECORDIAL PAIN: ICD-10-CM

## 2021-06-24 DIAGNOSIS — R07.89 CHEST DISCOMFORT: Primary | ICD-10-CM

## 2021-06-24 DIAGNOSIS — Z20.822 ENCOUNTER FOR PREPROCEDURE SCREENING LABORATORY TESTING FOR COVID-19: Primary | ICD-10-CM

## 2021-06-24 PROCEDURE — 84443 ASSAY THYROID STIM HORMONE: CPT

## 2021-06-24 PROCEDURE — 36415 COLL VENOUS BLD VENIPUNCTURE: CPT

## 2021-06-24 PROCEDURE — 82607 VITAMIN B-12: CPT

## 2021-06-24 PROCEDURE — 83036 HEMOGLOBIN GLYCOSYLATED A1C: CPT

## 2021-06-24 PROCEDURE — 86140 C-REACTIVE PROTEIN: CPT

## 2021-06-25 ENCOUNTER — ORDER TRANSCRIPTION (OUTPATIENT)
Dept: ADMINISTRATIVE | Facility: HOSPITAL | Age: 66
End: 2021-06-25

## 2021-06-25 ENCOUNTER — HOSPITAL ENCOUNTER (OUTPATIENT)
Dept: CV DIAGNOSTICS | Facility: HOSPITAL | Age: 66
Discharge: HOME | End: 2021-06-25
Attending: INTERNAL MEDICINE
Payer: MEDICARE

## 2021-06-25 ENCOUNTER — LAB ENCOUNTER (OUTPATIENT)
Dept: LAB | Age: 66
End: 2021-06-25
Attending: INTERNAL MEDICINE
Payer: MEDICARE

## 2021-06-25 DIAGNOSIS — Z01.818 PRE-OP TESTING: ICD-10-CM

## 2021-06-25 DIAGNOSIS — R06.02 SOB (SHORTNESS OF BREATH): ICD-10-CM

## 2021-06-25 DIAGNOSIS — Z11.59 ENCOUNTER FOR SCREENING FOR OTHER VIRAL DISEASES: ICD-10-CM

## 2021-06-25 DIAGNOSIS — R07.89 CHEST DISCOMFORT: ICD-10-CM

## 2021-06-25 DIAGNOSIS — Z11.59 ENCOUNTER FOR SCREENING FOR OTHER VIRAL DISEASES: Primary | ICD-10-CM

## 2021-06-25 DIAGNOSIS — R07.2 PERICARDIAL PAIN: ICD-10-CM

## 2021-06-25 LAB
SARS-COV-2 RNA SPEC QL NAA+PROBE: NOT DETECTED
SPECIMEN SOURCE: NORMAL

## 2021-06-25 PROCEDURE — 93018 CV STRESS TEST I&R ONLY: CPT | Performed by: INTERNAL MEDICINE

## 2021-06-25 PROCEDURE — 93350 STRESS TTE ONLY: CPT | Performed by: INTERNAL MEDICINE

## 2021-06-25 PROCEDURE — 93017 CV STRESS TEST TRACING ONLY: CPT | Performed by: INTERNAL MEDICINE

## 2021-06-28 ENCOUNTER — TELEPHONE (OUTPATIENT)
Dept: CARDIOLOGY | Age: 66
End: 2021-06-28

## 2021-07-21 ENCOUNTER — OFFICE VISIT (OUTPATIENT)
Dept: RHEUMATOLOGY | Facility: CLINIC | Age: 66
End: 2021-07-21
Payer: MEDICARE

## 2021-07-21 ENCOUNTER — OFFICE VISIT (OUTPATIENT)
Dept: CARDIOLOGY | Age: 66
End: 2021-07-21

## 2021-07-21 ENCOUNTER — LAB ENCOUNTER (OUTPATIENT)
Dept: LAB | Age: 66
End: 2021-07-21
Attending: INTERNAL MEDICINE
Payer: MEDICARE

## 2021-07-21 VITALS
WEIGHT: 139 LBS | DIASTOLIC BLOOD PRESSURE: 57 MMHG | SYSTOLIC BLOOD PRESSURE: 105 MMHG | BODY MASS INDEX: 21.07 KG/M2 | HEIGHT: 68 IN | HEART RATE: 60 BPM

## 2021-07-21 VITALS
OXYGEN SATURATION: 97 % | SYSTOLIC BLOOD PRESSURE: 112 MMHG | BODY MASS INDEX: 21.07 KG/M2 | WEIGHT: 139 LBS | HEART RATE: 64 BPM | TEMPERATURE: 98 F | DIASTOLIC BLOOD PRESSURE: 62 MMHG | HEIGHT: 68 IN

## 2021-07-21 DIAGNOSIS — R06.02 SHORTNESS OF BREATH: ICD-10-CM

## 2021-07-21 DIAGNOSIS — R53.82 CHRONIC FATIGUE: ICD-10-CM

## 2021-07-21 DIAGNOSIS — M35.01 SICCA SYNDROME WITH KERATOCONJUNCTIVITIS (HCC): Primary | ICD-10-CM

## 2021-07-21 DIAGNOSIS — G89.4 CHRONIC PAIN SYNDROME: ICD-10-CM

## 2021-07-21 DIAGNOSIS — M35.00 SJOGREN'S SYNDROME, WITH UNSPECIFIED ORGAN INVOLVEMENT (CMD): ICD-10-CM

## 2021-07-21 DIAGNOSIS — R07.89 CHEST DISCOMFORT: Primary | ICD-10-CM

## 2021-07-21 DIAGNOSIS — R07.89 CHEST PAIN, ATYPICAL: ICD-10-CM

## 2021-07-21 DIAGNOSIS — R07.2 PRECORDIAL PAIN: ICD-10-CM

## 2021-07-21 DIAGNOSIS — E78.2 MIXED HYPERLIPIDEMIA: ICD-10-CM

## 2021-07-21 PROCEDURE — 36415 COLL VENOUS BLD VENIPUNCTURE: CPT

## 2021-07-21 PROCEDURE — 99214 OFFICE O/P EST MOD 30 MIN: CPT | Performed by: INTERNAL MEDICINE

## 2021-07-21 PROCEDURE — 99212 OFFICE O/P EST SF 10 MIN: CPT | Performed by: INTERNAL MEDICINE

## 2021-07-21 PROCEDURE — 86038 ANTINUCLEAR ANTIBODIES: CPT

## 2021-07-21 RX ORDER — UBIDECARENONE 10 MG
10 CAPSULE ORAL DAILY
COMMUNITY

## 2021-07-21 RX ORDER — AMPICILLIN TRIHYDRATE 250 MG
CAPSULE ORAL
COMMUNITY

## 2021-07-21 RX ORDER — UBIDECARENONE 75 MG
50 CAPSULE ORAL DAILY
COMMUNITY

## 2021-07-21 NOTE — PATIENT INSTRUCTIONS
The cause of fatigue is unclear. Get 8 hours of sleep a night, take a dily 20 minute nap. Exercise regularly. Continue to drink plenty of water. Use artificial tears for dry eyes. Your labs recently were good.   Sed rate normal.  CRP normal.  Low dose

## 2021-07-21 NOTE — PROGRESS NOTES
[facility]  Dr. Chris Gaviria Progress Note     Subjective:   Emelia Asher is a(n) 76year old male. Current complaints: Patient presents with:   Follow - Up: LOV 4-21-21; c/o bilat hand pain, chronic fatigue \"real bad\", hard to get going in the [employer] a dily 20 minute nap. Exercise regularly. Continue to drink plenty of water. Use artificial tears for dry eyes. Your labs recently were good. Sed rate normal.  CRP normal.  Low dose Prednisone 5 mg per day is an option.   Plaquenil 200 mg a day is ano

## 2021-07-23 LAB — ANA SCREEN: NEGATIVE

## 2021-07-29 ENCOUNTER — TELEPHONE (OUTPATIENT)
Dept: RHEUMATOLOGY | Facility: CLINIC | Age: 66
End: 2021-07-29

## 2021-07-29 NOTE — TELEPHONE ENCOUNTER
AVISE  Test  Negative. Sanjay Jennings is very fatigued. Eating properly. Refer to primary care doctor for evaluation. ,

## 2021-08-27 ENCOUNTER — TELEPHONE (OUTPATIENT)
Dept: RHEUMATOLOGY | Facility: CLINIC | Age: 66
End: 2021-08-27

## 2021-08-27 NOTE — TELEPHONE ENCOUNTER
Pt calling to give update about consulting [facility] about his case. He just needs a letter of recommendation, last office notes, and any labs done. The fax number there is [phone].

## 2021-09-25 ENCOUNTER — APPOINTMENT (OUTPATIENT)
Dept: GENERAL RADIOLOGY | Facility: HOSPITAL | Age: 66
End: 2021-09-25
Attending: EMERGENCY MEDICINE
Payer: COMMERCIAL

## 2021-09-25 ENCOUNTER — HOSPITAL ENCOUNTER (EMERGENCY)
Facility: HOSPITAL | Age: 66
Discharge: HOME | End: 2021-09-25
Attending: EMERGENCY MEDICINE
Payer: COMMERCIAL

## 2021-09-25 VITALS
HEIGHT: 68 IN | BODY MASS INDEX: 21.22 KG/M2 | TEMPERATURE: 98 F | WEIGHT: 140 LBS | SYSTOLIC BLOOD PRESSURE: 124 MMHG | HEART RATE: 98 BPM | RESPIRATION RATE: 18 BRPM | DIASTOLIC BLOOD PRESSURE: 64 MMHG | OXYGEN SATURATION: 97 %

## 2021-09-25 DIAGNOSIS — M54.50 BACK PAIN, LUMBOSACRAL: ICD-10-CM

## 2021-09-25 DIAGNOSIS — R51.9 ACUTE NONINTRACTABLE HEADACHE, UNSPECIFIED HEADACHE TYPE: ICD-10-CM
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
## 2021-09-25 PROCEDURE — 72100 X-RAY EXAM L-S SPINE 2/3 VWS: CPT | Performed by: EMERGENCY MEDICINE

## 2021-09-25 PROCEDURE — 99283 EMERGENCY DEPT VISIT LOW MDM: CPT | Performed by: EMERGENCY MEDICINE
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
## 2021-09-29 ENCOUNTER — TELEPHONE (OUTPATIENT)
Dept: RHEUMATOLOGY | Facility: CLINIC | Age: 66
End: 2021-09-29

## 2021-09-29 NOTE — TELEPHONE ENCOUNTER
LOV 7-21-21  Future Appointments   Date Time Provider Nuvia Bardalesi   96/54/1052 30:88 AM Governor Lanes, MD [facility] EMG Rolly Phan pt for update; reports he had such bad pain in fingers of Left hand, he saw [facility] ortho/hand surgeon.   Notes in

## 2021-10-11 NOTE — TELEPHONE ENCOUNTER
Pt notified [place]  Refused to evaluate. explained we can refer him locally to ENT for Sjogren's Dr Adeola Cooney or Dina E [address] to [address] [place] [address] Sebastián. Pt states his pain is worsening every day. Co bilateral hand/finger pain with weakness.  Had xrays completed amy

## 2021-10-26 ENCOUNTER — TELEPHONE (OUTPATIENT)
Dept: RHEUMATOLOGY | Facility: CLINIC | Age: 66
End: 2021-10-26

## 2021-10-26 ENCOUNTER — LAB ENCOUNTER (OUTPATIENT)
Dept: LAB | Facility: HOSPITAL | Age: 66
End: 2021-10-26
Attending: INTERNAL MEDICINE
Payer: MEDICARE

## 2021-10-26 DIAGNOSIS — G89.4 CHRONIC PAIN SYNDROME: ICD-10-CM

## 2021-10-26 DIAGNOSIS — M17.12 PRIMARY OSTEOARTHRITIS OF LEFT KNEE: ICD-10-CM

## 2021-10-26 DIAGNOSIS — M51.36 DEGENERATIVE LUMBAR DISC: ICD-10-CM

## 2021-10-26 DIAGNOSIS — M51.36 DEGENERATIVE LUMBAR DISC: Primary | ICD-10-CM

## 2021-10-26 PROCEDURE — 80053 COMPREHEN METABOLIC PANEL: CPT

## 2021-10-26 PROCEDURE — 86140 C-REACTIVE PROTEIN: CPT

## 2021-10-26 PROCEDURE — 85025 COMPLETE CBC W/AUTO DIFF WBC: CPT

## 2021-10-26 PROCEDURE — 85652 RBC SED RATE AUTOMATED: CPT

## 2021-10-26 PROCEDURE — 36415 COLL VENOUS BLD VENIPUNCTURE: CPT

## 2021-10-26 NOTE — TELEPHONE ENCOUNTER
Pt called stating [facility] would not see him. Would like referral to [facility] [place]. Pt is c/o increasing pain and swelling to hands. Left worse than right. Pt notified labs pended. He agrees to have them drawn today.     Further states

## 2021-10-27 ENCOUNTER — TELEPHONE (OUTPATIENT)
Dept: RHEUMATOLOGY | Facility: CLINIC | Age: 66
End: 2021-10-27

## 2021-10-27 NOTE — TELEPHONE ENCOUNTER
Pt called to review lab results from yesterday. Reviewed notes per Dr Rufino Trujillo; pt reports he has not been feeling well. Pt states he does a foul smell to his stool and is trying to get a sooner appt with GI.   Advised he call and f/u with nurse at GI, Dr Evelina Gonzalez

## 2021-11-08 ENCOUNTER — OFFICE VISIT (OUTPATIENT)
Dept: RHEUMATOLOGY | Facility: CLINIC | Age: 66
End: 2021-11-08
Payer: MEDICARE

## 2021-11-08 VITALS
DIASTOLIC BLOOD PRESSURE: 58 MMHG | WEIGHT: 140 LBS | TEMPERATURE: 98 F | HEIGHT: 68 IN | HEART RATE: 75 BPM | BODY MASS INDEX: 21.22 KG/M2 | SYSTOLIC BLOOD PRESSURE: 115 MMHG | OXYGEN SATURATION: 97 %

## 2021-11-08 DIAGNOSIS — R53.82 CHRONIC FATIGUE: ICD-10-CM

## 2021-11-08 DIAGNOSIS — M35.01 SICCA SYNDROME WITH KERATOCONJUNCTIVITIS (HCC): Primary | ICD-10-CM

## 2021-11-08 DIAGNOSIS — M79.642 PAIN IN BOTH HANDS: ICD-10-CM

## 2021-11-08 DIAGNOSIS — R79.82 ELEVATED C-REACTIVE PROTEIN (CRP): ICD-10-CM

## 2021-11-08 DIAGNOSIS — M79.641 PAIN IN BOTH HANDS: ICD-10-CM

## 2021-11-08 PROCEDURE — 99214 OFFICE O/P EST MOD 30 MIN: CPT | Performed by: INTERNAL MEDICINE

## 2021-11-08 NOTE — PATIENT INSTRUCTIONS
Trial of ibuprofen 200 mg 2 tablets 3 times a day for finger pain. Well balanced diet. Mild exercise. 8 hours of sleep per  night. Next step for Sjogren's - drink plenty of water, use artificial tears. Biotene products.   Consider seeing rheumatolog

## 2021-11-08 NOTE — PROGRESS NOTES
[facility] RHEUMATOLOGY  Dr. Rick Larios Progress Note     Subjective:   Donna Lewis is a(n) 78year old male. Current complaints: Patient presents with: Follow - Up: LOV 07/21/21, LT hand pain along with swelling. Will have some pain in RT hand as well.  Has s Babatunde Mosher MD 09/0/9120 4:56 PM

## 2021-11-13 ENCOUNTER — LAB ENCOUNTER (OUTPATIENT)
Dept: LAB | Facility: HOSPITAL | Age: 66
End: 2021-11-13
Attending: STUDENT IN AN ORGANIZED HEALTH CARE EDUCATION/TRAINING PROGRAM
Payer: MEDICARE

## 2021-11-13 DIAGNOSIS — N40.0 BENIGN ENLARGEMENT OF PROSTATE: Primary | ICD-10-CM

## 2021-11-13 DIAGNOSIS — R53.83 FATIGUE: ICD-10-CM

## 2021-11-13 DIAGNOSIS — R19.7 DIARRHEA: ICD-10-CM

## 2021-11-13 DIAGNOSIS — Z00.00 ROUTINE GENERAL MEDICAL EXAMINATION AT A HEALTH CARE FACILITY: ICD-10-CM

## 2021-11-13 PROCEDURE — 87046 STOOL CULTR AEROBIC BACT EA: CPT

## 2021-11-13 PROCEDURE — 87177 OVA AND PARASITES SMEARS: CPT

## 2021-11-13 PROCEDURE — 87493 C DIFF AMPLIFIED PROBE: CPT

## 2021-11-13 PROCEDURE — 87329 GIARDIA AG IA: CPT

## 2021-11-13 PROCEDURE — 87209 SMEAR COMPLEX STAIN: CPT

## 2021-11-13 PROCEDURE — 82306 VITAMIN D 25 HYDROXY: CPT

## 2021-11-13 PROCEDURE — 89055 LEUKOCYTE ASSESSMENT FECAL: CPT

## 2021-11-13 PROCEDURE — 84443 ASSAY THYROID STIM HORMONE: CPT

## 2021-11-13 PROCEDURE — 80061 LIPID PANEL: CPT

## 2021-11-13 PROCEDURE — 83516 IMMUNOASSAY NONANTIBODY: CPT

## 2021-11-13 PROCEDURE — 82607 VITAMIN B-12: CPT

## 2021-11-13 PROCEDURE — 87272 CRYPTOSPORIDIUM AG IF: CPT

## 2021-11-13 PROCEDURE — 87045 FECES CULTURE AEROBIC BACT: CPT

## 2021-11-13 PROCEDURE — 36415 COLL VENOUS BLD VENIPUNCTURE: CPT

## 2021-11-22 ENCOUNTER — TELEPHONE (OUTPATIENT)
Dept: RHEUMATOLOGY | Facility: CLINIC | Age: 66
End: 2021-11-22

## 2021-11-22 NOTE — TELEPHONE ENCOUNTER
Pt calling in, stating that he has a personal question that he would like to ask Dr. Kika Devi.  Please advise

## 2021-11-22 NOTE — TELEPHONE ENCOUNTER
Returned call to pt; he states he has a personal matter he only wants to speak with Dr Malini Berger about. He will be available whenever  is able to call. He is aware  has clinic all day. He verbalized understanding and appreciated the call.

## 2021-11-30 ENCOUNTER — TELEPHONE (OUTPATIENT)
Dept: RHEUMATOLOGY | Facility: CLINIC | Age: 66
End: 2021-11-30

## 2021-12-01 NOTE — TELEPHONE ENCOUNTER
Brenton Sifuentes has developed dry eyes, dry mouth, now joint complaints. All of this started after exposure to raw sewage at his townhouse. Now with Sjogren's syndrome. It is possible that the raw sewage exposure triggered his   Sjogren's.   However cause of Sjogren

## 2022-01-18 ENCOUNTER — APPOINTMENT (OUTPATIENT)
Dept: GENERAL RADIOLOGY | Facility: HOSPITAL | Age: 67
End: 2022-01-18
Attending: EMERGENCY MEDICINE
Payer: MEDICARE

## 2022-01-18 ENCOUNTER — HOSPITAL ENCOUNTER (EMERGENCY)
Facility: HOSPITAL | Age: 67
Discharge: HOME | End: 2022-01-18
Attending: EMERGENCY MEDICINE
Payer: MEDICARE

## 2022-01-18 VITALS
RESPIRATION RATE: 16 BRPM | HEART RATE: 67 BPM | HEIGHT: 68 IN | BODY MASS INDEX: 21.22 KG/M2 | SYSTOLIC BLOOD PRESSURE: 128 MMHG | WEIGHT: 140 LBS | OXYGEN SATURATION: 97 % | TEMPERATURE: 99 F | DIASTOLIC BLOOD PRESSURE: 68 MMHG

## 2022-01-18 DIAGNOSIS — R00.0 TACHYCARDIA: Primary | ICD-10-CM

## 2022-01-18 LAB
ALBUMIN SERPL-MCNC: 3.5 G/DL (ref 3.4–5)
ALBUMIN/GLOB SERPL: 1 {RATIO} (ref 1–2)
ALP LIVER SERPL-CCNC: 60 U/L
ALT SERPL-CCNC: 33 U/L
ANION GAP SERPL CALC-SCNC: 3 MMOL/L (ref 0–18)
AST SERPL-CCNC: 16 U/L (ref 15–37)
ATRIAL RATE: 76 BPM
BASOPHILS # BLD AUTO: 0.04 X10(3) UL (ref 0–0.2)
BASOPHILS NFR BLD AUTO: 0.7 %
BILIRUB SERPL-MCNC: 0.4 MG/DL (ref 0.1–2)
BUN BLD-MCNC: 11 MG/DL (ref 7–18)
CALCIUM BLD-MCNC: 8.9 MG/DL (ref 8.5–10.1)
CHLORIDE SERPL-SCNC: 106 MMOL/L (ref 98–112)
CO2 SERPL-SCNC: 29 MMOL/L (ref 21–32)
CREAT BLD-MCNC: 0.84 MG/DL
EOSINOPHIL # BLD AUTO: 0.05 X10(3) UL (ref 0–0.7)
EOSINOPHIL NFR BLD AUTO: 0.9 %
ERYTHROCYTE [DISTWIDTH] IN BLOOD BY AUTOMATED COUNT: 12.6 %
GLOBULIN PLAS-MCNC: 3.4 G/DL (ref 2.8–4.4)
GLUCOSE BLD-MCNC: 124 MG/DL (ref 70–99)
HCT VFR BLD AUTO: 44.6 %
HGB BLD-MCNC: 15.3 G/DL
IMM GRANULOCYTES # BLD AUTO: 0.01 X10(3) UL (ref 0–1)
IMM GRANULOCYTES NFR BLD: 0.2 %
LYMPHOCYTES # BLD AUTO: 0.83 X10(3) UL (ref 1–4)
LYMPHOCYTES NFR BLD AUTO: 15.2 %
MCH RBC QN AUTO: 32.8 PG (ref 26–34)
MCHC RBC AUTO-ENTMCNC: 34.3 G/DL (ref 31–37)
MCV RBC AUTO: 95.7 FL
MONOCYTES # BLD AUTO: 0.65 X10(3) UL (ref 0.1–1)
MONOCYTES NFR BLD AUTO: 11.9 %
NEUTROPHILS # BLD AUTO: 3.88 X10 (3) UL (ref 1.5–7.7)
NEUTROPHILS # BLD AUTO: 3.88 X10(3) UL (ref 1.5–7.7)
NEUTROPHILS NFR BLD AUTO: 71.1 %
OSMOLALITY SERPL CALC.SUM OF ELEC: 287 MOSM/KG (ref 275–295)
P AXIS: 80 DEGREES
P-R INTERVAL: 232 MS
PLATELET # BLD AUTO: 172 10(3)UL (ref 150–450)
POTASSIUM SERPL-SCNC: 4.5 MMOL/L (ref 3.5–5.1)
PROT SERPL-MCNC: 6.9 G/DL (ref 6.4–8.2)
Q-T INTERVAL: 380 MS
QRS DURATION: 98 MS
QTC CALCULATION (BEZET): 427 MS
R AXIS: 31 DEGREES
RBC # BLD AUTO: 4.66 X10(6)UL
SARS-COV-2 RNA RESP QL NAA+PROBE: NOT DETECTED
SODIUM SERPL-SCNC: 138 MMOL/L (ref 136–145)
T AXIS: 69 DEGREES
TROPONIN I HIGH SENSITIVITY: 6 NG/L
TSI SER-ACNC: 1.98 MIU/ML (ref 0.36–3.74)
VENTRICULAR RATE: 76 BPM
WBC # BLD AUTO: 5.5 X10(3) UL (ref 4–11)

## 2022-01-18 PROCEDURE — 99284 EMERGENCY DEPT VISIT MOD MDM: CPT

## 2022-01-18 PROCEDURE — 93005 ELECTROCARDIOGRAM TRACING: CPT

## 2022-01-18 PROCEDURE — 36415 COLL VENOUS BLD VENIPUNCTURE: CPT

## 2022-01-18 PROCEDURE — 84484 ASSAY OF TROPONIN QUANT: CPT | Performed by: EMERGENCY MEDICINE

## 2022-01-18 PROCEDURE — 80053 COMPREHEN METABOLIC PANEL: CPT | Performed by: EMERGENCY MEDICINE

## 2022-01-18 PROCEDURE — 85025 COMPLETE CBC W/AUTO DIFF WBC: CPT | Performed by: EMERGENCY MEDICINE

## 2022-01-18 PROCEDURE — 84443 ASSAY THYROID STIM HORMONE: CPT | Performed by: EMERGENCY MEDICINE

## 2022-01-18 PROCEDURE — 93010 ELECTROCARDIOGRAM REPORT: CPT

## 2022-01-18 PROCEDURE — 71045 X-RAY EXAM CHEST 1 VIEW: CPT | Performed by: EMERGENCY MEDICINE

## 2022-01-18 NOTE — ED PROVIDER NOTES
Patient Seen in: [facility] Emergency Department      History   Patient presents with:  Arrythmia/Palpitations    Stated Complaint: tachycardia at home now HR94    Subjective:   HPI    34-year-old male presents today for evaluation of an elevated he other systems reviewed and negative except as noted above.     Physical Exam     ED Triage Vitals   BP 01/18/22 1122 137/72   Pulse 01/18/22 1122 76   Resp 01/18/22 1122 17   Temp 01/18/22 1222 98.8 °F (37.1 °C)   Temp src 01/18/22 1222 Oral   SpO2 01/18/22 With Platelet.   Procedure                               Abnormality         Status                     ---------                               -----------         ------                     CBC W/ DIFFERENTIAL[[id number]]          Abnormal            Final Given his reassuring work-up, patient is comfortable with discharge at this time. I advised outpatient follow-up with his primary care doctor for reassessment and return for any worsening symptoms. Patient feels comfortable with discharge plan.

## 2022-01-18 NOTE — ED INITIAL ASSESSMENT (HPI)
Pt arrived to ED with c/o tachycardia. Pt sts his HR has been low 100s. Pt denies CP, SOB, HA, N/V/D, and blurred vision.

## 2022-01-31 ENCOUNTER — TELEPHONE (OUTPATIENT)
Dept: RHEUMATOLOGY | Facility: CLINIC | Age: 67
End: 2022-01-31

## 2022-01-31 NOTE — TELEPHONE ENCOUNTER
Pt returned my call; states he's concerned about labs done on recent ER visit 1-18-22. Absolute Lymphocytes 0.83. Wanted to be sure Dr Emy Winslow was aware of this and if he needed to be concerned. Reassurance offered. Pt verbalized his understanding and appreciated the call. Please advise.

## 2022-02-07 DIAGNOSIS — Z01.812 PRE-PROCEDURAL LABORATORY EXAMINATION: Primary | ICD-10-CM

## 2022-02-08 ENCOUNTER — OFFICE VISIT (OUTPATIENT)
Dept: RHEUMATOLOGY | Facility: CLINIC | Age: 67
End: 2022-02-08
Payer: MEDICARE

## 2022-02-08 ENCOUNTER — HOSPITAL ENCOUNTER (OUTPATIENT)
Dept: LAB | Age: 67
Discharge: HOME | End: 2022-02-08
Attending: INTERNAL MEDICINE

## 2022-02-08 VITALS
OXYGEN SATURATION: 95 % | HEART RATE: 78 BPM | SYSTOLIC BLOOD PRESSURE: 112 MMHG | DIASTOLIC BLOOD PRESSURE: 64 MMHG | TEMPERATURE: 97 F | HEIGHT: 68 IN | BODY MASS INDEX: 22.13 KG/M2 | WEIGHT: 146 LBS

## 2022-02-08 DIAGNOSIS — Z01.812 PRE-PROCEDURAL LABORATORY EXAMINATION: ICD-10-CM

## 2022-02-08 DIAGNOSIS — R10.84 GENERALIZED ABDOMINAL PAIN: ICD-10-CM

## 2022-02-08 DIAGNOSIS — M35.01 SICCA SYNDROME WITH KERATOCONJUNCTIVITIS (HCC): Primary | ICD-10-CM

## 2022-02-08 DIAGNOSIS — R14.0 BLOATED ABDOMEN: ICD-10-CM

## 2022-02-08 PROBLEM — M25.471 ANKLE EDEMA, BILATERAL: Status: RESOLVED | Noted: 2020-12-17 | Resolved: 2022-02-08

## 2022-02-08 PROBLEM — M25.472 ANKLE EDEMA, BILATERAL: Status: RESOLVED | Noted: 2020-12-17 | Resolved: 2022-02-08

## 2022-02-08 PROBLEM — R10.31 INGUINAL PAIN OF BOTH SIDES: Status: RESOLVED | Noted: 2019-08-27 | Resolved: 2022-02-08

## 2022-02-08 PROBLEM — R10.32 INGUINAL PAIN OF BOTH SIDES: Status: RESOLVED | Noted: 2019-08-27 | Resolved: 2022-02-08

## 2022-02-08 LAB
SARS-COV-2 RNA RESP QL NAA+PROBE: NOT DETECTED
SERVICE CMNT-IMP: NORMAL
SERVICE CMNT-IMP: NORMAL

## 2022-02-08 PROCEDURE — 99214 OFFICE O/P EST MOD 30 MIN: CPT | Performed by: INTERNAL MEDICINE

## 2022-02-08 PROCEDURE — U0003 INFECTIOUS AGENT DETECTION BY NUCLEIC ACID (DNA OR RNA); SEVERE ACUTE RESPIRATORY SYNDROME CORONAVIRUS 2 (SARS-COV-2) (CORONAVIRUS DISEASE [COVID-19]), AMPLIFIED PROBE TECHNIQUE, MAKING USE OF HIGH THROUGHPUT TECHNOLOGIES AS DESCRIBED BY CMS-2020-01-R: HCPCS

## 2022-02-08 NOTE — PATIENT INSTRUCTIONS
Drink plenty of fluid,   Use artificial tears as needed. Do your colonoscopy and gastroscopy. Well balanced diet. Return to office 2-3 months.

## 2022-02-11 ENCOUNTER — HOSPITAL ENCOUNTER (OUTPATIENT)
Dept: GASTROENTEROLOGY | Age: 67
Discharge: HOME | End: 2022-02-11
Attending: INTERNAL MEDICINE

## 2022-02-11 ENCOUNTER — ANESTHESIA EVENT (OUTPATIENT)
Dept: GASTROENTEROLOGY | Age: 67
End: 2022-02-11

## 2022-02-11 ENCOUNTER — ANESTHESIA (OUTPATIENT)
Dept: GASTROENTEROLOGY | Age: 67
End: 2022-02-11

## 2022-02-11 VITALS
RESPIRATION RATE: 10 BRPM | SYSTOLIC BLOOD PRESSURE: 104 MMHG | WEIGHT: 140 LBS | TEMPERATURE: 97.2 F | HEART RATE: 64 BPM | DIASTOLIC BLOOD PRESSURE: 64 MMHG | HEIGHT: 68 IN | OXYGEN SATURATION: 98 % | BODY MASS INDEX: 21.22 KG/M2

## 2022-02-11 DIAGNOSIS — R19.4 CHANGE IN BOWEL HABITS: ICD-10-CM

## 2022-02-11 DIAGNOSIS — K64.9 HEMORRHOIDS, UNSPECIFIED HEMORRHOID TYPE: ICD-10-CM

## 2022-02-11 DIAGNOSIS — K29.70 GASTRITIS: ICD-10-CM

## 2022-02-11 PROCEDURE — 10002800 HB RX 250 W HCPCS

## 2022-02-11 PROCEDURE — 10004451 HB PACU RECOVERY 1ST 30 MINUTES

## 2022-02-11 PROCEDURE — 13000001 HB PHASE II RECOVERY EA 30 MINUTES

## 2022-02-11 PROCEDURE — 88342 IMHCHEM/IMCYTCHM 1ST ANTB: CPT | Performed by: INTERNAL MEDICINE

## 2022-02-11 PROCEDURE — 10002807 HB RX 258: Performed by: INTERNAL MEDICINE

## 2022-02-11 PROCEDURE — 13000029 HB GI MAJOR COMPLEX CASE EA ADD MINUTE

## 2022-02-11 PROCEDURE — 10002801 HB RX 250 W/O HCPCS

## 2022-02-11 PROCEDURE — 13000008 HB ANESTHESIA MAC OUTSIDE OR

## 2022-02-11 PROCEDURE — 13000028 HB GI MAJOR COMPLEX CASE S/U + 1ST 15 MIN

## 2022-02-11 RX ORDER — SODIUM CHLORIDE 9 MG/ML
INJECTION, SOLUTION INTRAVENOUS CONTINUOUS
Status: DISCONTINUED | OUTPATIENT
Start: 2022-02-11 | End: 2022-02-13 | Stop reason: HOSPADM

## 2022-02-11 RX ORDER — PROPOFOL 10 MG/ML
INJECTION, EMULSION INTRAVENOUS PRN
Status: DISCONTINUED | OUTPATIENT
Start: 2022-02-11 | End: 2022-02-11

## 2022-02-11 RX ORDER — MIDAZOLAM HYDROCHLORIDE 1 MG/ML
INJECTION, SOLUTION INTRAMUSCULAR; INTRAVENOUS PRN
Status: DISCONTINUED | OUTPATIENT
Start: 2022-02-11 | End: 2022-02-11

## 2022-02-11 RX ADMIN — PROPOFOL 50 MCG/KG/MIN: 10 INJECTION, EMULSION INTRAVENOUS at 08:10

## 2022-02-11 RX ADMIN — FENTANYL CITRATE 50 MCG: 50 INJECTION, SOLUTION INTRAMUSCULAR; INTRAVENOUS at 08:20

## 2022-02-11 RX ADMIN — MIDAZOLAM HYDROCHLORIDE 2 MG: 1 INJECTION, SOLUTION INTRAMUSCULAR; INTRAVENOUS at 08:10

## 2022-02-11 RX ADMIN — FENTANYL CITRATE 50 MCG: 50 INJECTION, SOLUTION INTRAMUSCULAR; INTRAVENOUS at 08:10

## 2022-02-11 RX ADMIN — SODIUM CHLORIDE: 9 INJECTION, SOLUTION INTRAVENOUS at 07:30

## 2022-02-11 RX ADMIN — PROPOFOL 50 MG: 10 INJECTION, EMULSION INTRAVENOUS at 08:10

## 2022-02-11 ASSESSMENT — PAIN SCALES - GENERAL
PAINLEVEL_OUTOF10: 0

## 2022-02-11 ASSESSMENT — ACTIVITIES OF DAILY LIVING (ADL)
ADL_SCORE: 12
NEEDS_ASSIST: NO
HISTORY OF FALLING IN THE LAST YEAR (PRIOR TO ADMISSION): NO
SENSORY_SUPPORT_DEVICES: EYEGLASSES
ADL_BEFORE_ADMISSION: INDEPENDENT
ADL_SHORT_OF_BREATH: NO

## 2022-02-11 ASSESSMENT — COGNITIVE AND FUNCTIONAL STATUS - GENERAL
ARE YOU DEAF OR DO YOU HAVE SERIOUS DIFFICULTY  HEARING: NO
ARE YOU BLIND OR DO YOU HAVE SERIOUS DIFFICULTY SEEING, EVEN WHEN WEARING GLASSES: NO

## 2022-02-11 ASSESSMENT — PAIN SCALES - WONG BAKER
WONGBAKER_NUMERICALRESPONSE: 0
WONGBAKER_NUMERICALRESPONSE: 0

## 2022-02-12 ENCOUNTER — LAB ENCOUNTER (OUTPATIENT)
Dept: LAB | Facility: HOSPITAL | Age: 67
End: 2022-02-12
Attending: INTERNAL MEDICINE
Payer: MEDICARE

## 2022-02-12 DIAGNOSIS — R19.7 DIARRHEA: Primary | ICD-10-CM

## 2022-02-12 DIAGNOSIS — R63.4 WEIGHT LOSS: ICD-10-CM

## 2022-02-13 ENCOUNTER — LAB ENCOUNTER (OUTPATIENT)
Dept: LAB | Facility: HOSPITAL | Age: 67
End: 2022-02-13
Attending: INTERNAL MEDICINE
Payer: MEDICARE

## 2022-02-13 DIAGNOSIS — R63.4 WEIGHT LOSS: ICD-10-CM

## 2022-02-13 DIAGNOSIS — R19.7 DIARRHEA: ICD-10-CM

## 2022-02-13 LAB
ALBUMIN SERPL-MCNC: 3.5 G/DL (ref 3.4–5)
ALBUMIN/GLOB SERPL: 1.1 {RATIO} (ref 1–2)
ALP LIVER SERPL-CCNC: 54 U/L
ALT SERPL-CCNC: 31 U/L
ANION GAP SERPL CALC-SCNC: 2 MMOL/L (ref 0–18)
AST SERPL-CCNC: 18 U/L (ref 15–37)
BASOPHILS # BLD AUTO: 0.06 X10(3) UL (ref 0–0.2)
BASOPHILS NFR BLD AUTO: 1 %
BILIRUB SERPL-MCNC: 0.4 MG/DL (ref 0.1–2)
BUN BLD-MCNC: 9 MG/DL (ref 7–18)
CALCIUM BLD-MCNC: 9 MG/DL (ref 8.5–10.1)
CHLORIDE SERPL-SCNC: 111 MMOL/L (ref 98–112)
CREAT BLD-MCNC: 0.78 MG/DL
DEPRECATED HBV CORE AB SER IA-ACNC: 65.2 NG/ML
EOSINOPHIL NFR BLD AUTO: 3.3 %
ERYTHROCYTE [DISTWIDTH] IN BLOOD BY AUTOMATED COUNT: 12.5 %
FASTING STATUS PATIENT QL REPORTED: YES
FOLATE SERPL-MCNC: 8.6 NG/ML (ref 8.7–?)
GLOBULIN PLAS-MCNC: 3.1 G/DL (ref 2.8–4.4)
GLUCOSE BLD-MCNC: 85 MG/DL (ref 70–99)
HCT VFR BLD AUTO: 44.2 %
HGB BLD-MCNC: 14.8 G/DL
IMM GRANULOCYTES # BLD AUTO: 0.01 X10(3) UL (ref 0–1)
IMM GRANULOCYTES NFR BLD: 0.2 %
INR BLD: 1.1 (ref 0.8–1.2)
IRON SATN MFR SERPL: 20 %
IRON SERPL-MCNC: 72 UG/DL
LYMPHOCYTES # BLD AUTO: 1.65 X10(3) UL (ref 1–4)
LYMPHOCYTES NFR BLD AUTO: 28.6 %
MCH RBC QN AUTO: 32.7 PG (ref 26–34)
MCHC RBC AUTO-ENTMCNC: 33.5 G/DL (ref 31–37)
MCV RBC AUTO: 97.8 FL
MONOCYTES # BLD AUTO: 0.74 X10(3) UL (ref 0.1–1)
MONOCYTES NFR BLD AUTO: 12.8 %
NEUTROPHILS # BLD AUTO: 3.11 X10 (3) UL (ref 1.5–7.7)
NEUTROPHILS # BLD AUTO: 3.11 X10(3) UL (ref 1.5–7.7)
NEUTROPHILS NFR BLD AUTO: 54.1 %
OSMOLALITY SERPL CALC.SUM OF ELEC: 292 MOSM/KG (ref 275–295)
PLATELET # BLD AUTO: 152 10(3)UL (ref 150–450)
POTASSIUM SERPL-SCNC: 4.2 MMOL/L (ref 3.5–5.1)
PROT SERPL-MCNC: 6.6 G/DL (ref 6.4–8.2)
PROTHROMBIN TIME: 14.2 SECONDS (ref 11.6–14.8)
RBC # BLD AUTO: 4.52 X10(6)UL
SODIUM SERPL-SCNC: 142 MMOL/L (ref 136–145)
TIBC SERPL-MCNC: 356 UG/DL (ref 240–450)
TRANSFERRIN SERPL-MCNC: 239 MG/DL (ref 200–360)
TSI SER-ACNC: 3.45 MIU/ML (ref 0.36–3.74)
VIT B12 SERPL-MCNC: 1068 PG/ML (ref 193–986)
WBC # BLD AUTO: 5.8 X10(3) UL (ref 4–11)

## 2022-02-13 PROCEDURE — 85730 THROMBOPLASTIN TIME PARTIAL: CPT

## 2022-02-13 PROCEDURE — 84443 ASSAY THYROID STIM HORMONE: CPT

## 2022-02-13 PROCEDURE — 82607 VITAMIN B-12: CPT

## 2022-02-13 PROCEDURE — 36415 COLL VENOUS BLD VENIPUNCTURE: CPT

## 2022-02-13 PROCEDURE — 83540 ASSAY OF IRON: CPT

## 2022-02-13 PROCEDURE — 85610 PROTHROMBIN TIME: CPT

## 2022-02-13 PROCEDURE — 82728 ASSAY OF FERRITIN: CPT

## 2022-02-13 PROCEDURE — 82746 ASSAY OF FOLIC ACID SERUM: CPT

## 2022-02-13 PROCEDURE — 80053 COMPREHEN METABOLIC PANEL: CPT

## 2022-02-13 PROCEDURE — 83550 IRON BINDING TEST: CPT

## 2022-02-13 PROCEDURE — 83516 IMMUNOASSAY NONANTIBODY: CPT

## 2022-02-13 PROCEDURE — 85025 COMPLETE CBC W/AUTO DIFF WBC: CPT

## 2022-02-16 LAB — PARIETAL CELL ANTIBODY, IGG: 1.1 UNITS

## 2022-02-17 LAB
ASR DISCLAIMER: NORMAL
CASE RPRT: NORMAL
CLINICAL INFO: NORMAL
PATH REPORT.FINAL DX SPEC: NORMAL
PATH REPORT.GROSS SPEC: NORMAL

## 2022-03-26 ENCOUNTER — HOSPITAL ENCOUNTER (OUTPATIENT)
Dept: ULTRASOUND IMAGING | Age: 67
Discharge: HOME | End: 2022-03-26
Attending: INTERNAL MEDICINE
Payer: MEDICARE

## 2022-03-26 DIAGNOSIS — R63.0 LOSS OF APPETITE: ICD-10-CM

## 2022-03-26 DIAGNOSIS — R53.83 MALAISE AND FATIGUE: ICD-10-CM

## 2022-03-26 DIAGNOSIS — R10.84 GENERALIZED ABDOMINAL PAIN: ICD-10-CM

## 2022-03-26 DIAGNOSIS — R53.81 MALAISE AND FATIGUE: ICD-10-CM

## 2022-03-26 PROCEDURE — 76700 US EXAM ABDOM COMPLETE: CPT | Performed by: INTERNAL MEDICINE

## 2022-04-22 ENCOUNTER — LAB ENCOUNTER (OUTPATIENT)
Dept: LAB | Facility: HOSPITAL | Age: 67
End: 2022-04-22
Attending: INTERNAL MEDICINE
Payer: MEDICARE

## 2022-04-22 DIAGNOSIS — R19.8 GASTROINTESTINAL COMPLAINT: Primary | ICD-10-CM

## 2022-04-22 LAB
ALBUMIN SERPL-MCNC: 3.7 G/DL (ref 3.4–5)
ALP LIVER SERPL-CCNC: 49 U/L
ALT SERPL-CCNC: 33 U/L
AST SERPL-CCNC: 17 U/L (ref 15–37)
BASOPHILS # BLD AUTO: 0.03 X10(3) UL (ref 0–0.2)
BASOPHILS NFR BLD AUTO: 0.5 %
BILIRUB DIRECT SERPL-MCNC: 0.2 MG/DL (ref 0–0.2)
BILIRUB SERPL-MCNC: 0.6 MG/DL (ref 0.1–2)
BUN BLD-MCNC: 7 MG/DL (ref 7–18)
CREAT BLD-MCNC: 0.68 MG/DL
EOSINOPHIL # BLD AUTO: 0.03 X10(3) UL (ref 0–0.7)
EOSINOPHIL NFR BLD AUTO: 0.5 %
ERYTHROCYTE [DISTWIDTH] IN BLOOD BY AUTOMATED COUNT: 13 %
HCT VFR BLD AUTO: 41.6 %
HGB BLD-MCNC: 14.3 G/DL
IMM GRANULOCYTES # BLD AUTO: 0.02 X10(3) UL (ref 0–1)
IMM GRANULOCYTES NFR BLD: 0.3 %
LIPASE SERPL-CCNC: 189 U/L (ref 73–393)
LYMPHOCYTES # BLD AUTO: 1.34 X10(3) UL (ref 1–4)
LYMPHOCYTES NFR BLD AUTO: 20.5 %
MCH RBC QN AUTO: 33.3 PG (ref 26–34)
MCHC RBC AUTO-ENTMCNC: 34.4 G/DL (ref 31–37)
MCV RBC AUTO: 96.7 FL
MONOCYTES # BLD AUTO: 0.69 X10(3) UL (ref 0.1–1)
MONOCYTES NFR BLD AUTO: 10.6 %
NEUTROPHILS # BLD AUTO: 4.42 X10 (3) UL (ref 1.5–7.7)
NEUTROPHILS # BLD AUTO: 4.42 X10(3) UL (ref 1.5–7.7)
NEUTROPHILS NFR BLD AUTO: 67.6 %
PLATELET # BLD AUTO: 158 10(3)UL (ref 150–450)
PROT SERPL-MCNC: 6.6 G/DL (ref 6.4–8.2)
RBC # BLD AUTO: 4.3 X10(6)UL
WBC # BLD AUTO: 6.5 X10(3) UL (ref 4–11)

## 2022-04-22 PROCEDURE — 84520 ASSAY OF UREA NITROGEN: CPT

## 2022-04-22 PROCEDURE — 82565 ASSAY OF CREATININE: CPT

## 2022-04-22 PROCEDURE — 85025 COMPLETE CBC W/AUTO DIFF WBC: CPT

## 2022-04-22 PROCEDURE — 80076 HEPATIC FUNCTION PANEL: CPT

## 2022-04-22 PROCEDURE — 36415 COLL VENOUS BLD VENIPUNCTURE: CPT

## 2022-04-22 PROCEDURE — 83690 ASSAY OF LIPASE: CPT

## 2022-04-23 ENCOUNTER — APPOINTMENT (OUTPATIENT)
Dept: GENERAL RADIOLOGY | Facility: HOSPITAL | Age: 67
End: 2022-04-23
Attending: EMERGENCY MEDICINE
Payer: MEDICARE

## 2022-04-23 ENCOUNTER — HOSPITAL ENCOUNTER (EMERGENCY)
Facility: HOSPITAL | Age: 67
Discharge: HOME | End: 2022-04-23
Attending: EMERGENCY MEDICINE
Payer: MEDICARE

## 2022-04-23 VITALS
HEART RATE: 71 BPM | BODY MASS INDEX: 21 KG/M2 | TEMPERATURE: 98 F | DIASTOLIC BLOOD PRESSURE: 76 MMHG | OXYGEN SATURATION: 100 % | SYSTOLIC BLOOD PRESSURE: 119 MMHG | RESPIRATION RATE: 16 BRPM | WEIGHT: 138.88 LBS

## 2022-04-23 DIAGNOSIS — S90.121A CONTUSION OF LESSER TOE OF RIGHT FOOT WITHOUT DAMAGE TO NAIL, INITIAL ENCOUNTER: Primary | ICD-10-CM

## 2022-04-23 PROCEDURE — 99283 EMERGENCY DEPT VISIT LOW MDM: CPT

## 2022-04-23 PROCEDURE — 73660 X-RAY EXAM OF TOE(S): CPT | Performed by: EMERGENCY MEDICINE

## 2022-04-23 NOTE — ED INITIAL ASSESSMENT (HPI)
Pt injured R. Foot last night. Pain with mobility  4th toe is swollen and bruised.  Concerned for break

## 2022-04-27 ENCOUNTER — OFFICE VISIT (OUTPATIENT)
Dept: RHEUMATOLOGY | Facility: CLINIC | Age: 67
End: 2022-04-27
Payer: MEDICARE

## 2022-04-27 VITALS
HEIGHT: 68 IN | BODY MASS INDEX: 21.82 KG/M2 | TEMPERATURE: 97 F | WEIGHT: 144 LBS | HEART RATE: 84 BPM | DIASTOLIC BLOOD PRESSURE: 70 MMHG | SYSTOLIC BLOOD PRESSURE: 122 MMHG | OXYGEN SATURATION: 97 %

## 2022-04-27 DIAGNOSIS — K58.9 IRRITABLE BOWEL SYNDROME WITHOUT DIARRHEA: ICD-10-CM

## 2022-04-27 DIAGNOSIS — R14.0 BLOATED ABDOMEN: ICD-10-CM

## 2022-04-27 DIAGNOSIS — M35.01 SICCA SYNDROME WITH KERATOCONJUNCTIVITIS (HCC): Primary | ICD-10-CM

## 2022-04-27 PROBLEM — K59.04 CHRONIC IDIOPATHIC CONSTIPATION: Status: RESOLVED | Noted: 2020-09-14 | Resolved: 2022-04-27

## 2022-04-27 PROCEDURE — 99214 OFFICE O/P EST MOD 30 MIN: CPT | Performed by: INTERNAL MEDICINE

## 2022-04-27 NOTE — PATIENT INSTRUCTIONS
Current plan see liver specialist Dr Jorgito Slade for IBS. Continue to adjust your diet as best you can. Ok to use probiotics. Take omega 3 fish oil. Take Vitamin D. With dry mouth and dry eyes - drink extra fluid, use artificial tears. Use dry mouth Xylo melts/lozengers. Continue to walk daily. Consider trial of pilocarpine medication for dry mouth. Refer to ENT - Dr Ranjan Ken of [facility] or Dr Amna Ocampo of [facility]. Return to office 3 months.

## 2022-04-28 ENCOUNTER — OFFICE VISIT (OUTPATIENT)
Dept: SURGERY | Facility: CLINIC | Age: 67
End: 2022-04-28
Payer: MEDICARE

## 2022-04-28 DIAGNOSIS — R82.90 URINE FINDING: Primary | ICD-10-CM

## 2022-04-28 DIAGNOSIS — R68.81 EARLY SATIETY: ICD-10-CM

## 2022-04-28 DIAGNOSIS — R63.0 LOSS OF APPETITE: ICD-10-CM

## 2022-04-28 LAB
APPEARANCE: CLEAR
BILIRUBIN: NEGATIVE
GLUCOSE (URINE DIPSTICK): NEGATIVE MG/DL
KETONES (URINE DIPSTICK): NEGATIVE MG/DL
LEUKOCYTES: NEGATIVE
MULTISTIX LOT#: NORMAL NUMERIC
NITRITE, URINE: NEGATIVE
OCCULT BLOOD: NEGATIVE
PH, URINE: 6 (ref 4.5–8)
PROTEIN (URINE DIPSTICK): NEGATIVE MG/DL
SPECIFIC GRAVITY: 1.01 (ref 1–1.03)
URINE-COLOR: YELLOW
UROBILINOGEN,SEMI-QN: 0.2 MG/DL (ref 0–1.9)

## 2022-04-28 PROCEDURE — 81003 URINALYSIS AUTO W/O SCOPE: CPT | Performed by: UROLOGY

## 2022-04-28 PROCEDURE — 99213 OFFICE O/P EST LOW 20 MIN: CPT | Performed by: UROLOGY

## 2022-04-28 PROCEDURE — 51798 US URINE CAPACITY MEASURE: CPT | Performed by: UROLOGY

## 2022-04-30 ENCOUNTER — LAB ENCOUNTER (OUTPATIENT)
Dept: LAB | Facility: HOSPITAL | Age: 67
End: 2022-04-30
Attending: INTERNAL MEDICINE
Payer: MEDICARE

## 2022-04-30 DIAGNOSIS — R19.8 GASTROINTESTINAL COMPLAINT: ICD-10-CM

## 2022-04-30 PROCEDURE — 82656 EL-1 FECAL QUAL/SEMIQ: CPT

## 2022-05-04 LAB — PANCREATIC ELASTASE , FECAL: >800 UG/G

## 2022-05-05 ENCOUNTER — OFFICE VISIT (OUTPATIENT)
Dept: SURGERY | Facility: CLINIC | Age: 67
End: 2022-05-05
Payer: MEDICARE

## 2022-05-05 VITALS
WEIGHT: 144.38 LBS | HEART RATE: 71 BPM | BODY MASS INDEX: 22 KG/M2 | DIASTOLIC BLOOD PRESSURE: 70 MMHG | SYSTOLIC BLOOD PRESSURE: 123 MMHG | TEMPERATURE: 97 F | OXYGEN SATURATION: 98 %

## 2022-07-08 ENCOUNTER — LAB ENCOUNTER (OUTPATIENT)
Dept: LAB | Facility: HOSPITAL | Age: 67
End: 2022-07-08
Attending: STUDENT IN AN ORGANIZED HEALTH CARE EDUCATION/TRAINING PROGRAM
Payer: MEDICARE

## 2022-07-08 DIAGNOSIS — M13.0 POLYARTHROPATHY: ICD-10-CM

## 2022-07-08 DIAGNOSIS — Z80.42 FAMILY HISTORY OF MALIGNANT NEOPLASM OF PROSTATE: ICD-10-CM

## 2022-07-08 DIAGNOSIS — R53.83 FATIGUE: ICD-10-CM

## 2022-07-08 DIAGNOSIS — Z82.49 FAMILY HISTORY OF ISCHEMIC HEART DISEASE: Primary | ICD-10-CM

## 2022-07-08 DIAGNOSIS — E55.9 VITAMIN D DEFICIENCY: ICD-10-CM

## 2022-07-08 LAB
ALBUMIN SERPL-MCNC: 3.6 G/DL (ref 3.4–5)
ALBUMIN/GLOB SERPL: 1.1 {RATIO} (ref 1–2)
ALP LIVER SERPL-CCNC: 53 U/L
ALT SERPL-CCNC: 34 U/L
ANION GAP SERPL CALC-SCNC: 3 MMOL/L (ref 0–18)
AST SERPL-CCNC: 18 U/L (ref 15–37)
BASOPHILS # BLD AUTO: 0.05 X10(3) UL (ref 0–0.2)
BASOPHILS NFR BLD AUTO: 0.8 %
BILIRUB SERPL-MCNC: 0.6 MG/DL (ref 0.1–2)
BUN BLD-MCNC: 13 MG/DL (ref 7–18)
CALCIUM BLD-MCNC: 9.1 MG/DL (ref 8.5–10.1)
CHLORIDE SERPL-SCNC: 109 MMOL/L (ref 98–112)
CHOLEST SERPL-MCNC: 150 MG/DL (ref ?–200)
CO2 SERPL-SCNC: 30 MMOL/L (ref 21–32)
COMPLEXED PSA SERPL-MCNC: 1.32 NG/ML (ref ?–4)
CREAT BLD-MCNC: 0.82 MG/DL
CRP SERPL-MCNC: <0.29 MG/DL (ref ?–0.3)
EOSINOPHIL # BLD AUTO: 0.11 X10(3) UL (ref 0–0.7)
EOSINOPHIL NFR BLD AUTO: 1.8 %
ERYTHROCYTE [DISTWIDTH] IN BLOOD BY AUTOMATED COUNT: 12.9 %
ERYTHROCYTE [SEDIMENTATION RATE] IN BLOOD: 16 MM/HR
FASTING PATIENT LIPID ANSWER: YES
FASTING STATUS PATIENT QL REPORTED: YES
GLOBULIN PLAS-MCNC: 3.4 G/DL (ref 2.8–4.4)
GLUCOSE BLD-MCNC: 103 MG/DL (ref 70–99)
HCT VFR BLD AUTO: 46.1 %
HDLC SERPL-MCNC: 47 MG/DL (ref 40–59)
HGB BLD-MCNC: 15.6 G/DL
IMM GRANULOCYTES # BLD AUTO: 0.03 X10(3) UL (ref 0–1)
IMM GRANULOCYTES NFR BLD: 0.5 %
LDLC SERPL CALC-MCNC: 89 MG/DL (ref ?–100)
LYMPHOCYTES # BLD AUTO: 1.32 X10(3) UL (ref 1–4)
LYMPHOCYTES NFR BLD AUTO: 21.7 %
MCH RBC QN AUTO: 33.1 PG (ref 26–34)
MCHC RBC AUTO-ENTMCNC: 33.8 G/DL (ref 31–37)
MCV RBC AUTO: 97.9 FL
MONOCYTES # BLD AUTO: 0.76 X10(3) UL (ref 0.1–1)
MONOCYTES NFR BLD AUTO: 12.5 %
NEUTROPHILS # BLD AUTO: 3.81 X10 (3) UL (ref 1.5–7.7)
NEUTROPHILS # BLD AUTO: 3.81 X10(3) UL (ref 1.5–7.7)
NEUTROPHILS NFR BLD AUTO: 62.7 %
NONHDLC SERPL-MCNC: 103 MG/DL (ref ?–130)
OSMOLALITY SERPL CALC.SUM OF ELEC: 294 MOSM/KG (ref 275–295)
PLATELET # BLD AUTO: 165 10(3)UL (ref 150–450)
POTASSIUM SERPL-SCNC: 4.4 MMOL/L (ref 3.5–5.1)
PROT SERPL-MCNC: 7 G/DL (ref 6.4–8.2)
RBC # BLD AUTO: 4.71 X10(6)UL
RHEUMATOID FACT SERPL-ACNC: <10 IU/ML (ref ?–15)
SODIUM SERPL-SCNC: 142 MMOL/L (ref 136–145)
TRIGL SERPL-MCNC: 72 MG/DL (ref 30–149)
TSI SER-ACNC: 3.02 MIU/ML (ref 0.36–3.74)
URATE SERPL-MCNC: 5.4 MG/DL
VIT D+METAB SERPL-MCNC: 49.6 NG/ML (ref 30–100)
VLDLC SERPL CALC-MCNC: 12 MG/DL (ref 0–30)
WBC # BLD AUTO: 6.1 X10(3) UL (ref 4–11)

## 2022-07-08 PROCEDURE — 85025 COMPLETE CBC W/AUTO DIFF WBC: CPT

## 2022-07-08 PROCEDURE — 36415 COLL VENOUS BLD VENIPUNCTURE: CPT

## 2022-07-08 PROCEDURE — 84550 ASSAY OF BLOOD/URIC ACID: CPT

## 2022-07-08 PROCEDURE — 85652 RBC SED RATE AUTOMATED: CPT

## 2022-07-08 PROCEDURE — 80053 COMPREHEN METABOLIC PANEL: CPT

## 2022-07-08 PROCEDURE — 80061 LIPID PANEL: CPT

## 2022-07-08 PROCEDURE — 84443 ASSAY THYROID STIM HORMONE: CPT

## 2022-07-08 PROCEDURE — 86140 C-REACTIVE PROTEIN: CPT

## 2022-07-08 PROCEDURE — 86038 ANTINUCLEAR ANTIBODIES: CPT

## 2022-07-08 PROCEDURE — 86431 RHEUMATOID FACTOR QUANT: CPT

## 2022-07-08 PROCEDURE — 82306 VITAMIN D 25 HYDROXY: CPT

## 2022-07-11 LAB — ANA SER QL: NEGATIVE

## 2022-09-07 ENCOUNTER — LAB ENCOUNTER (OUTPATIENT)
Dept: LAB | Facility: HOSPITAL | Age: 67
End: 2022-09-07
Attending: STUDENT IN AN ORGANIZED HEALTH CARE EDUCATION/TRAINING PROGRAM
Payer: MEDICARE

## 2022-09-07 DIAGNOSIS — R35.0 URINARY FREQUENCY: ICD-10-CM

## 2022-09-07 DIAGNOSIS — R60.9 EDEMA, PERIPHERAL: Primary | ICD-10-CM

## 2022-09-07 DIAGNOSIS — L65.9 LOSS OF HAIR: ICD-10-CM

## 2022-09-07 LAB
ALBUMIN SERPL-MCNC: 3.9 G/DL (ref 3.4–5)
ALBUMIN/GLOB SERPL: 1.3 {RATIO} (ref 1–2)
ALP LIVER SERPL-CCNC: 57 U/L
ALT SERPL-CCNC: 27 U/L
ANION GAP SERPL CALC-SCNC: 3 MMOL/L (ref 0–18)
AST SERPL-CCNC: 14 U/L (ref 15–37)
BASOPHILS # BLD AUTO: 0.03 X10(3) UL (ref 0–0.2)
BASOPHILS NFR BLD AUTO: 0.6 %
BILIRUB SERPL-MCNC: 0.6 MG/DL (ref 0.1–2)
BUN BLD-MCNC: 7 MG/DL (ref 7–18)
CALCIUM BLD-MCNC: 9.2 MG/DL (ref 8.5–10.1)
CHLORIDE SERPL-SCNC: 109 MMOL/L (ref 98–112)
CO2 SERPL-SCNC: 27 MMOL/L (ref 21–32)
CREAT BLD-MCNC: 0.7 MG/DL
DEPRECATED HBV CORE AB SER IA-ACNC: 67.6 NG/ML
EOSINOPHIL # BLD AUTO: 0.1 X10(3) UL (ref 0–0.7)
EOSINOPHIL NFR BLD AUTO: 2 %
ERYTHROCYTE [DISTWIDTH] IN BLOOD BY AUTOMATED COUNT: 12.7 %
FASTING STATUS PATIENT QL REPORTED: YES
GFR SERPLBLD BASED ON 1.73 SQ M-ARVRAT: 100 ML/MIN/1.73M2 (ref 60–?)
GLOBULIN PLAS-MCNC: 3 G/DL (ref 2.8–4.4)
GLUCOSE BLD-MCNC: 91 MG/DL (ref 70–99)
HCT VFR BLD AUTO: 44.7 %
HGB BLD-MCNC: 15.2 G/DL
IMM GRANULOCYTES # BLD AUTO: 0.02 X10(3) UL (ref 0–1)
IMM GRANULOCYTES NFR BLD: 0.4 %
LYMPHOCYTES # BLD AUTO: 1.12 X10(3) UL (ref 1–4)
LYMPHOCYTES NFR BLD AUTO: 22.3 %
MCH RBC QN AUTO: 33 PG (ref 26–34)
MCHC RBC AUTO-ENTMCNC: 34 G/DL (ref 31–37)
MCV RBC AUTO: 97 FL
MONOCYTES # BLD AUTO: 0.69 X10(3) UL (ref 0.1–1)
MONOCYTES NFR BLD AUTO: 13.7 %
NEUTROPHILS # BLD AUTO: 3.07 X10 (3) UL (ref 1.5–7.7)
NEUTROPHILS # BLD AUTO: 3.07 X10(3) UL (ref 1.5–7.7)
NEUTROPHILS NFR BLD AUTO: 61 %
NT-PROBNP SERPL-MCNC: 222 PG/ML (ref ?–125)
OSMOLALITY SERPL CALC.SUM OF ELEC: 286 MOSM/KG (ref 275–295)
PLATELET # BLD AUTO: 183 10(3)UL (ref 150–450)
POTASSIUM SERPL-SCNC: 3.7 MMOL/L (ref 3.5–5.1)
PROT SERPL-MCNC: 6.9 G/DL (ref 6.4–8.2)
PSA SERPL-MCNC: 1.48 NG/ML (ref ?–4)
RBC # BLD AUTO: 4.61 X10(6)UL
SODIUM SERPL-SCNC: 139 MMOL/L (ref 136–145)
TSI SER-ACNC: 2.95 MIU/ML (ref 0.36–3.74)
VIT B12 SERPL-MCNC: 861 PG/ML (ref 193–986)
WBC # BLD AUTO: 5 X10(3) UL (ref 4–11)

## 2022-09-07 PROCEDURE — 80053 COMPREHEN METABOLIC PANEL: CPT

## 2022-09-07 PROCEDURE — 84153 ASSAY OF PSA TOTAL: CPT

## 2022-09-07 PROCEDURE — 83880 ASSAY OF NATRIURETIC PEPTIDE: CPT

## 2022-09-07 PROCEDURE — 36415 COLL VENOUS BLD VENIPUNCTURE: CPT

## 2022-09-07 PROCEDURE — 84443 ASSAY THYROID STIM HORMONE: CPT

## 2022-09-07 PROCEDURE — 82728 ASSAY OF FERRITIN: CPT

## 2022-09-07 PROCEDURE — 82607 VITAMIN B-12: CPT

## 2022-09-07 PROCEDURE — 85025 COMPLETE CBC W/AUTO DIFF WBC: CPT

## 2022-09-08 ENCOUNTER — OFFICE VISIT (OUTPATIENT)
Dept: RHEUMATOLOGY | Facility: CLINIC | Age: 67
End: 2022-09-08
Payer: MEDICARE

## 2022-09-08 VITALS
HEART RATE: 63 BPM | HEIGHT: 68 IN | WEIGHT: 144 LBS | DIASTOLIC BLOOD PRESSURE: 60 MMHG | BODY MASS INDEX: 21.82 KG/M2 | OXYGEN SATURATION: 96 % | SYSTOLIC BLOOD PRESSURE: 110 MMHG

## 2022-09-08 DIAGNOSIS — G89.4 CHRONIC PAIN SYNDROME: ICD-10-CM

## 2022-09-08 DIAGNOSIS — M35.01 SICCA SYNDROME WITH KERATOCONJUNCTIVITIS (HCC): Primary | ICD-10-CM

## 2022-09-08 PROCEDURE — 99214 OFFICE O/P EST MOD 30 MIN: CPT | Performed by: INTERNAL MEDICINE

## 2022-09-08 RX ORDER — PERPHENAZINE/AMITRIPTYLINE HCL 4 MG-50 MG
TABLET ORAL 2 TIMES DAILY
COMMUNITY

## 2022-09-08 NOTE — PATIENT INSTRUCTIONS
Dealing with chronic Sjogren's syndrome which crest causing dry eyes dry mouth and also some sense of not feeling well. Okay to try an anti-inflammatory diet review eat more fruit and vegetables and avoid red meat and especially avoid junk food. Continue to drink plenty of water. Avoid salt and avoid fat in your diet. Exercise regularly. Do CRP, ESR, AVISE test.  ES Tylnol prn for pain. Use artificial tars prn. Return to office 3 months.

## 2022-09-12 ENCOUNTER — LAB ENCOUNTER (OUTPATIENT)
Dept: LAB | Age: 67
End: 2022-09-12
Attending: INTERNAL MEDICINE
Payer: MEDICARE

## 2022-09-12 DIAGNOSIS — G89.4 CHRONIC PAIN SYNDROME: ICD-10-CM

## 2022-09-12 DIAGNOSIS — M35.01 SICCA SYNDROME WITH KERATOCONJUNCTIVITIS (HCC): ICD-10-CM

## 2022-09-12 DIAGNOSIS — D89.89 RADIATION CHIMERA (HCC): Primary | ICD-10-CM

## 2022-09-12 LAB
CRP SERPL-MCNC: <0.29 MG/DL (ref ?–0.3)
ERYTHROCYTE [SEDIMENTATION RATE] IN BLOOD: 1 MM/HR

## 2022-09-12 PROCEDURE — 85652 RBC SED RATE AUTOMATED: CPT

## 2022-09-12 PROCEDURE — 36415 COLL VENOUS BLD VENIPUNCTURE: CPT

## 2022-09-12 PROCEDURE — 86140 C-REACTIVE PROTEIN: CPT

## 2022-09-19 ENCOUNTER — TELEPHONE (OUTPATIENT)
Dept: RHEUMATOLOGY | Facility: CLINIC | Age: 67
End: 2022-09-19

## 2022-09-19 NOTE — TELEPHONE ENCOUNTER
Tests Results on Zyncd. Avise test negative. AVISE test shows low likelihood of SLE. WANDA test by IgG is 26.8 units positive which is slightly positive. Negative ranges less than 20. Borderline ranges 20-60. And strong positive is greater than 60. WANDA by hep 2 cells is negative. DNA test negative SM test negative SSA SSB test negative rheumatoid factor test negative anticardiolipin test negative glycoprotein test negative thyroglobulin and thyroperoxidase test negative.

## 2022-10-18 ENCOUNTER — HOSPITAL ENCOUNTER (OUTPATIENT)
Age: 67
Discharge: HOME | End: 2022-10-18
Payer: MEDICARE

## 2022-10-18 VITALS
BODY MASS INDEX: 21 KG/M2 | WEIGHT: 140 LBS | SYSTOLIC BLOOD PRESSURE: 131 MMHG | DIASTOLIC BLOOD PRESSURE: 66 MMHG | TEMPERATURE: 98 F | HEART RATE: 86 BPM | OXYGEN SATURATION: 96 % | RESPIRATION RATE: 18 BRPM

## 2022-10-18 DIAGNOSIS — J11.1 INFLUENZA-LIKE ILLNESS: Primary | ICD-10-CM

## 2022-10-18 DIAGNOSIS — Z20.822 ENCOUNTER FOR LABORATORY TESTING FOR COVID-19 VIRUS: ICD-10-CM

## 2022-10-18 LAB
POCT INFLUENZA A: NEGATIVE
POCT INFLUENZA B: NEGATIVE
SARS-COV-2 RNA RESP QL NAA+PROBE: NOT DETECTED

## 2022-10-18 PROCEDURE — 87502 INFLUENZA DNA AMP PROBE: CPT | Performed by: NURSE PRACTITIONER

## 2022-10-18 PROCEDURE — 99203 OFFICE O/P NEW LOW 30 MIN: CPT | Performed by: NURSE PRACTITIONER

## 2022-10-18 PROCEDURE — U0002 COVID-19 LAB TEST NON-CDC: HCPCS | Performed by: NURSE PRACTITIONER

## 2022-11-28 ENCOUNTER — TELEPHONE (OUTPATIENT)
Dept: SURGERY | Facility: CLINIC | Age: 67
End: 2022-11-28

## 2022-12-01 ENCOUNTER — OFFICE VISIT (OUTPATIENT)
Dept: SURGERY | Facility: CLINIC | Age: 67
End: 2022-12-01
Payer: MEDICARE

## 2022-12-01 DIAGNOSIS — R39.15 URGENCY OF MICTURITION: ICD-10-CM

## 2022-12-01 DIAGNOSIS — N41.1 CHRONIC PROSTATITIS: ICD-10-CM

## 2022-12-01 DIAGNOSIS — N39.41 URGE INCONTINENCE: ICD-10-CM

## 2022-12-01 DIAGNOSIS — N40.0 ENLARGED PROSTATE: Primary | ICD-10-CM

## 2022-12-01 LAB
APPEARANCE: CLEAR
BILIRUBIN: NEGATIVE
GLUCOSE (URINE DIPSTICK): NEGATIVE MG/DL
LEUKOCYTES: NEGATIVE
MULTISTIX LOT#: ABNORMAL NUMERIC
NITRITE, URINE: NEGATIVE
PH, URINE: 6 (ref 4.5–8)
SPECIFIC GRAVITY: 1.02 (ref 1–1.03)
URINE-COLOR: YELLOW
UROBILINOGEN,SEMI-QN: 0.2 MG/DL (ref 0–1.9)

## 2022-12-01 PROCEDURE — 81003 URINALYSIS AUTO W/O SCOPE: CPT | Performed by: UROLOGY

## 2022-12-01 PROCEDURE — 99213 OFFICE O/P EST LOW 20 MIN: CPT | Performed by: UROLOGY

## 2022-12-01 RX ORDER — SULFAMETHOXAZOLE AND TRIMETHOPRIM 800; 160 MG/1; MG/1
TABLET ORAL
Qty: 20 TABLET | Refills: 1 | Status: SHIPPED | OUTPATIENT
Start: 2022-12-01

## 2022-12-03 ENCOUNTER — HOSPITAL ENCOUNTER (OUTPATIENT)
Dept: MRI IMAGING | Age: 67
Discharge: HOME | End: 2022-12-03
Attending: ORTHOPAEDIC SURGERY
Payer: MEDICARE

## 2022-12-03 DIAGNOSIS — M25.561 PAIN IN RIGHT KNEE: ICD-10-CM

## 2022-12-03 PROCEDURE — 73721 MRI JNT OF LWR EXTRE W/O DYE: CPT | Performed by: ORTHOPAEDIC SURGERY

## 2022-12-08 ENCOUNTER — OFFICE VISIT (OUTPATIENT)
Dept: RHEUMATOLOGY | Facility: CLINIC | Age: 67
End: 2022-12-08
Payer: COMMERCIAL

## 2022-12-08 VITALS
RESPIRATION RATE: 16 BRPM | HEART RATE: 82 BPM | OXYGEN SATURATION: 99 % | SYSTOLIC BLOOD PRESSURE: 107 MMHG | DIASTOLIC BLOOD PRESSURE: 62 MMHG | HEIGHT: 68 IN | WEIGHT: 140 LBS | BODY MASS INDEX: 21.22 KG/M2

## 2022-12-08 DIAGNOSIS — M79.641 PAIN IN BOTH HANDS: ICD-10-CM

## 2022-12-08 DIAGNOSIS — M79.642 PAIN IN BOTH HANDS: ICD-10-CM

## 2022-12-08 DIAGNOSIS — M35.01 SICCA SYNDROME WITH KERATOCONJUNCTIVITIS (HCC): Primary | ICD-10-CM

## 2022-12-08 DIAGNOSIS — M17.11 PRIMARY OSTEOARTHRITIS OF RIGHT KNEE: ICD-10-CM

## 2022-12-08 PROCEDURE — 3008F BODY MASS INDEX DOCD: CPT | Performed by: INTERNAL MEDICINE

## 2022-12-08 PROCEDURE — 3078F DIAST BP <80 MM HG: CPT | Performed by: INTERNAL MEDICINE

## 2022-12-08 PROCEDURE — 3074F SYST BP LT 130 MM HG: CPT | Performed by: INTERNAL MEDICINE

## 2022-12-08 PROCEDURE — 99214 OFFICE O/P EST MOD 30 MIN: CPT | Performed by: INTERNAL MEDICINE

## 2022-12-08 NOTE — PATIENT INSTRUCTIONS
Continue drinking water for dry mouth. Use artificlal tears as needed. Continue vegan diet since you feel good on it. Exercise as tolerated. Return to office 6 months.

## 2022-12-25 ENCOUNTER — APPOINTMENT (OUTPATIENT)
Dept: ULTRASOUND IMAGING | Facility: HOSPITAL | Age: 67
End: 2022-12-25
Attending: EMERGENCY MEDICINE
Payer: MEDICARE

## 2022-12-25 ENCOUNTER — HOSPITAL ENCOUNTER (EMERGENCY)
Facility: HOSPITAL | Age: 67
Discharge: HOME | End: 2022-12-25
Attending: EMERGENCY MEDICINE
Payer: MEDICARE

## 2022-12-25 ENCOUNTER — APPOINTMENT (OUTPATIENT)
Dept: GENERAL RADIOLOGY | Facility: HOSPITAL | Age: 67
End: 2022-12-25
Attending: EMERGENCY MEDICINE
Payer: MEDICARE

## 2022-12-25 VITALS
DIASTOLIC BLOOD PRESSURE: 76 MMHG | OXYGEN SATURATION: 99 % | HEART RATE: 61 BPM | RESPIRATION RATE: 18 BRPM | SYSTOLIC BLOOD PRESSURE: 132 MMHG | TEMPERATURE: 98 F

## 2022-12-25 DIAGNOSIS — R07.89 CHEST PAIN, MUSCULOSKELETAL: Primary | ICD-10-CM

## 2022-12-25 DIAGNOSIS — R60.0 PEDAL EDEMA: ICD-10-CM

## 2022-12-25 LAB
ALBUMIN SERPL-MCNC: 3.7 G/DL (ref 3.4–5)
ALBUMIN/GLOB SERPL: 1.2 {RATIO} (ref 1–2)
ALP LIVER SERPL-CCNC: 54 U/L
ALT SERPL-CCNC: 34 U/L
ANION GAP SERPL CALC-SCNC: 1 MMOL/L (ref 0–18)
AST SERPL-CCNC: 17 U/L (ref 15–37)
BASOPHILS # BLD AUTO: 0.05 X10(3) UL (ref 0–0.2)
BASOPHILS NFR BLD AUTO: 0.8 %
BILIRUB SERPL-MCNC: 0.4 MG/DL (ref 0.1–2)
BUN BLD-MCNC: 13 MG/DL (ref 7–18)
CALCIUM BLD-MCNC: 8.7 MG/DL (ref 8.5–10.1)
CHLORIDE SERPL-SCNC: 109 MMOL/L (ref 98–112)
CO2 SERPL-SCNC: 29 MMOL/L (ref 21–32)
CREAT BLD-MCNC: 0.74 MG/DL
D DIMER PPP FEU-MCNC: 0.39 UG/ML FEU (ref ?–0.68)
EOSINOPHIL # BLD AUTO: 0.04 X10(3) UL (ref 0–0.7)
EOSINOPHIL NFR BLD AUTO: 0.7 %
ERYTHROCYTE [DISTWIDTH] IN BLOOD BY AUTOMATED COUNT: 12.5 %
GFR SERPLBLD BASED ON 1.73 SQ M-ARVRAT: 99 ML/MIN/1.73M2 (ref 60–?)
GLOBULIN PLAS-MCNC: 3 G/DL (ref 2.8–4.4)
GLUCOSE BLD-MCNC: 102 MG/DL (ref 70–99)
HCT VFR BLD AUTO: 44.1 %
HGB BLD-MCNC: 15 G/DL
IMM GRANULOCYTES # BLD AUTO: 0.02 X10(3) UL (ref 0–1)
IMM GRANULOCYTES NFR BLD: 0.3 %
LYMPHOCYTES # BLD AUTO: 1.07 X10(3) UL (ref 1–4)
LYMPHOCYTES NFR BLD AUTO: 17.6 %
MCH RBC QN AUTO: 33 PG (ref 26–34)
MCHC RBC AUTO-ENTMCNC: 34 G/DL (ref 31–37)
MCV RBC AUTO: 97.1 FL
MONOCYTES # BLD AUTO: 0.79 X10(3) UL (ref 0.1–1)
MONOCYTES NFR BLD AUTO: 13 %
NEUTROPHILS # BLD AUTO: 4.11 X10 (3) UL (ref 1.5–7.7)
NEUTROPHILS # BLD AUTO: 4.11 X10(3) UL (ref 1.5–7.7)
NEUTROPHILS NFR BLD AUTO: 67.6 %
NT-PROBNP SERPL-MCNC: 339 PG/ML (ref ?–125)
OSMOLALITY SERPL CALC.SUM OF ELEC: 288 MOSM/KG (ref 275–295)
PLATELET # BLD AUTO: 158 10(3)UL (ref 150–450)
POTASSIUM SERPL-SCNC: 3.7 MMOL/L (ref 3.5–5.1)
PROT SERPL-MCNC: 6.7 G/DL (ref 6.4–8.2)
RBC # BLD AUTO: 4.54 X10(6)UL
SARS-COV-2 RNA RESP QL NAA+PROBE: NOT DETECTED
SODIUM SERPL-SCNC: 139 MMOL/L (ref 136–145)
TROPONIN I HIGH SENSITIVITY: 6 NG/L
TROPONIN I HIGH SENSITIVITY: 6 NG/L
WBC # BLD AUTO: 6.1 X10(3) UL (ref 4–11)

## 2022-12-25 PROCEDURE — 99285 EMERGENCY DEPT VISIT HI MDM: CPT

## 2022-12-25 PROCEDURE — 71045 X-RAY EXAM CHEST 1 VIEW: CPT | Performed by: EMERGENCY MEDICINE

## 2022-12-25 PROCEDURE — 83880 ASSAY OF NATRIURETIC PEPTIDE: CPT | Performed by: EMERGENCY MEDICINE

## 2022-12-25 PROCEDURE — 85025 COMPLETE CBC W/AUTO DIFF WBC: CPT

## 2022-12-25 PROCEDURE — 84484 ASSAY OF TROPONIN QUANT: CPT | Performed by: EMERGENCY MEDICINE

## 2022-12-25 PROCEDURE — 93970 EXTREMITY STUDY: CPT | Performed by: EMERGENCY MEDICINE

## 2022-12-25 PROCEDURE — 85025 COMPLETE CBC W/AUTO DIFF WBC: CPT | Performed by: EMERGENCY MEDICINE

## 2022-12-25 PROCEDURE — 36415 COLL VENOUS BLD VENIPUNCTURE: CPT

## 2022-12-25 PROCEDURE — 93005 ELECTROCARDIOGRAM TRACING: CPT

## 2022-12-25 PROCEDURE — 84484 ASSAY OF TROPONIN QUANT: CPT

## 2022-12-25 PROCEDURE — 80053 COMPREHEN METABOLIC PANEL: CPT

## 2022-12-25 PROCEDURE — 93010 ELECTROCARDIOGRAM REPORT: CPT

## 2022-12-25 PROCEDURE — 80053 COMPREHEN METABOLIC PANEL: CPT | Performed by: EMERGENCY MEDICINE

## 2022-12-25 PROCEDURE — 85379 FIBRIN DEGRADATION QUANT: CPT | Performed by: EMERGENCY MEDICINE

## 2022-12-25 NOTE — DISCHARGE INSTRUCTIONS
Low-salt diet Advil or Tylenol as needed for pain elevate the legs follow-up with cardiology on Tuesday return for worsening symptoms

## 2022-12-26 LAB
ATRIAL RATE: 68 BPM
P AXIS: 71 DEGREES
P-R INTERVAL: 190 MS
Q-T INTERVAL: 400 MS
QRS DURATION: 92 MS
QTC CALCULATION (BEZET): 425 MS
R AXIS: 46 DEGREES
T AXIS: 52 DEGREES
VENTRICULAR RATE: 68 BPM

## 2022-12-27 ENCOUNTER — OFFICE VISIT (OUTPATIENT)
Dept: SURGERY | Facility: CLINIC | Age: 67
End: 2022-12-27
Payer: MEDICARE

## 2022-12-27 DIAGNOSIS — R82.90 URINE FINDING: Primary | ICD-10-CM

## 2022-12-27 LAB
APPEARANCE: CLEAR
BILIRUBIN: NEGATIVE
GLUCOSE (URINE DIPSTICK): NEGATIVE MG/DL
LEUKOCYTES: NEGATIVE
MULTISTIX LOT#: ABNORMAL NUMERIC
NITRITE, URINE: NEGATIVE
PH, URINE: 5.5 (ref 4.5–8)
SPECIFIC GRAVITY: >=1.03 (ref 1–1.03)
UROBILINOGEN,SEMI-QN: 0.2 MG/DL (ref 0–1.9)

## 2022-12-27 PROCEDURE — 81003 URINALYSIS AUTO W/O SCOPE: CPT | Performed by: UROLOGY

## 2022-12-27 PROCEDURE — 51798 US URINE CAPACITY MEASURE: CPT | Performed by: UROLOGY

## 2022-12-27 PROCEDURE — 99213 OFFICE O/P EST LOW 20 MIN: CPT | Performed by: UROLOGY

## 2023-01-07 ENCOUNTER — LAB ENCOUNTER (OUTPATIENT)
Dept: LAB | Facility: HOSPITAL | Age: 68
End: 2023-01-07
Payer: MEDICARE

## 2023-01-07 DIAGNOSIS — Z11.59 SCREENING FOR VIRAL DISEASE: ICD-10-CM

## 2023-01-07 DIAGNOSIS — Z13.220 SCREENING FOR HYPERLIPIDEMIA: Primary | ICD-10-CM

## 2023-01-07 DIAGNOSIS — Z13.220 SCREENING FOR LIPOID DISORDERS: ICD-10-CM

## 2023-01-07 DIAGNOSIS — R60.9 EDEMA: ICD-10-CM

## 2023-01-07 LAB
ALBUMIN SERPL-MCNC: 3.4 G/DL (ref 3.4–5)
ALBUMIN/GLOB SERPL: 1 {RATIO} (ref 1–2)
ALP LIVER SERPL-CCNC: 57 U/L
ALT SERPL-CCNC: 26 U/L
ANION GAP SERPL CALC-SCNC: 1 MMOL/L (ref 0–18)
AST SERPL-CCNC: 17 U/L (ref 15–37)
BASOPHILS # BLD AUTO: 0.06 X10(3) UL (ref 0–0.2)
BASOPHILS NFR BLD AUTO: 1.1 %
BILIRUB SERPL-MCNC: 0.5 MG/DL (ref 0.1–2)
BUN BLD-MCNC: 14 MG/DL (ref 7–18)
CALCIUM BLD-MCNC: 9.3 MG/DL (ref 8.5–10.1)
CHLORIDE SERPL-SCNC: 111 MMOL/L (ref 98–112)
CHOLEST SERPL-MCNC: 129 MG/DL (ref ?–200)
CO2 SERPL-SCNC: 29 MMOL/L (ref 21–32)
CORTIS SERPL-MCNC: 18 UG/DL
CREAT BLD-MCNC: 0.9 MG/DL
EOSINOPHIL # BLD AUTO: 0.1 X10(3) UL (ref 0–0.7)
EOSINOPHIL NFR BLD AUTO: 1.8 %
ERYTHROCYTE [DISTWIDTH] IN BLOOD BY AUTOMATED COUNT: 12.7 %
FASTING PATIENT LIPID ANSWER: YES
FASTING STATUS PATIENT QL REPORTED: YES
GFR SERPLBLD BASED ON 1.73 SQ M-ARVRAT: 93 ML/MIN/1.73M2 (ref 60–?)
GLOBULIN PLAS-MCNC: 3.5 G/DL (ref 2.8–4.4)
GLUCOSE BLD-MCNC: 103 MG/DL (ref 70–99)
HCT VFR BLD AUTO: 44.9 %
HCV AB SERPL QL IA: NONREACTIVE
HDLC SERPL-MCNC: 41 MG/DL (ref 40–59)
HGB BLD-MCNC: 15.6 G/DL
IMM GRANULOCYTES # BLD AUTO: 0.02 X10(3) UL (ref 0–1)
IMM GRANULOCYTES NFR BLD: 0.4 %
LDLC SERPL CALC-MCNC: 75 MG/DL (ref ?–100)
LYMPHOCYTES # BLD AUTO: 1.4 X10(3) UL (ref 1–4)
LYMPHOCYTES NFR BLD AUTO: 25.7 %
MCH RBC QN AUTO: 33.4 PG (ref 26–34)
MCHC RBC AUTO-ENTMCNC: 34.7 G/DL (ref 31–37)
MCV RBC AUTO: 96.1 FL
MONOCYTES # BLD AUTO: 0.67 X10(3) UL (ref 0.1–1)
MONOCYTES NFR BLD AUTO: 12.3 %
NEUTROPHILS # BLD AUTO: 3.19 X10 (3) UL (ref 1.5–7.7)
NEUTROPHILS # BLD AUTO: 3.19 X10(3) UL (ref 1.5–7.7)
NEUTROPHILS NFR BLD AUTO: 58.7 %
NONHDLC SERPL-MCNC: 88 MG/DL (ref ?–130)
OSMOLALITY SERPL CALC.SUM OF ELEC: 293 MOSM/KG (ref 275–295)
PLATELET # BLD AUTO: 195 10(3)UL (ref 150–450)
POTASSIUM SERPL-SCNC: 4.6 MMOL/L (ref 3.5–5.1)
PREALB SERPL-MCNC: 23.9 MG/DL (ref 20–40)
PROT SERPL-MCNC: 6.9 G/DL (ref 6.4–8.2)
RBC # BLD AUTO: 4.67 X10(6)UL
SODIUM SERPL-SCNC: 141 MMOL/L (ref 136–145)
T4 FREE SERPL-MCNC: 0.6 NG/DL (ref 0.8–1.7)
TRIGL SERPL-MCNC: 62 MG/DL (ref 30–149)
TSI SER-ACNC: 4.9 MIU/ML (ref 0.36–3.74)
VLDLC SERPL CALC-MCNC: 10 MG/DL (ref 0–30)
WBC # BLD AUTO: 5.4 X10(3) UL (ref 4–11)

## 2023-01-07 PROCEDURE — 86803 HEPATITIS C AB TEST: CPT

## 2023-01-07 PROCEDURE — 80053 COMPREHEN METABOLIC PANEL: CPT

## 2023-01-07 PROCEDURE — 36415 COLL VENOUS BLD VENIPUNCTURE: CPT

## 2023-01-07 PROCEDURE — 85025 COMPLETE CBC W/AUTO DIFF WBC: CPT

## 2023-01-07 PROCEDURE — 84443 ASSAY THYROID STIM HORMONE: CPT

## 2023-01-07 PROCEDURE — 82533 TOTAL CORTISOL: CPT

## 2023-01-07 PROCEDURE — 84134 ASSAY OF PREALBUMIN: CPT

## 2023-01-07 PROCEDURE — 80061 LIPID PANEL: CPT

## 2023-01-07 PROCEDURE — 84439 ASSAY OF FREE THYROXINE: CPT

## 2023-01-13 ENCOUNTER — HOSPITAL ENCOUNTER (EMERGENCY)
Facility: HOSPITAL | Age: 68
Discharge: HOME | End: 2023-01-13
Attending: EMERGENCY MEDICINE
Payer: MEDICARE

## 2023-01-13 ENCOUNTER — APPOINTMENT (OUTPATIENT)
Dept: GENERAL RADIOLOGY | Facility: HOSPITAL | Age: 68
End: 2023-01-13
Attending: EMERGENCY MEDICINE
Payer: MEDICARE

## 2023-01-13 VITALS
TEMPERATURE: 98 F | DIASTOLIC BLOOD PRESSURE: 58 MMHG | WEIGHT: 138 LBS | BODY MASS INDEX: 20.92 KG/M2 | HEART RATE: 72 BPM | SYSTOLIC BLOOD PRESSURE: 112 MMHG | OXYGEN SATURATION: 97 % | RESPIRATION RATE: 16 BRPM | HEIGHT: 68 IN

## 2023-01-13 DIAGNOSIS — R25.1 SHAKINESS: Primary | ICD-10-CM

## 2023-01-13 DIAGNOSIS — F41.9 ANXIETY: ICD-10-CM

## 2023-01-13 LAB
ALBUMIN SERPL-MCNC: 3.5 G/DL (ref 3.4–5)
ALBUMIN/GLOB SERPL: 1 {RATIO} (ref 1–2)
ALP LIVER SERPL-CCNC: 56 U/L
ALT SERPL-CCNC: 27 U/L
ANION GAP SERPL CALC-SCNC: 5 MMOL/L (ref 0–18)
AST SERPL-CCNC: 14 U/L (ref 15–37)
ATRIAL RATE: 75 BPM
BASOPHILS # BLD AUTO: 0.03 X10(3) UL (ref 0–0.2)
BASOPHILS NFR BLD AUTO: 0.5 %
BILIRUB SERPL-MCNC: 0.6 MG/DL (ref 0.1–2)
BILIRUB UR QL STRIP.AUTO: NEGATIVE
BUN BLD-MCNC: 15 MG/DL (ref 7–18)
CALCIUM BLD-MCNC: 9.2 MG/DL (ref 8.5–10.1)
CHLORIDE SERPL-SCNC: 106 MMOL/L (ref 98–112)
CLARITY UR REFRACT.AUTO: CLEAR
CO2 SERPL-SCNC: 32 MMOL/L (ref 21–32)
COLOR UR AUTO: YELLOW
CREAT BLD-MCNC: 0.83 MG/DL
EOSINOPHIL # BLD AUTO: 0.03 X10(3) UL (ref 0–0.7)
EOSINOPHIL NFR BLD AUTO: 0.5 %
ERYTHROCYTE [DISTWIDTH] IN BLOOD BY AUTOMATED COUNT: 12.6 %
GFR SERPLBLD BASED ON 1.73 SQ M-ARVRAT: 95 ML/MIN/1.73M2 (ref 60–?)
GLOBULIN PLAS-MCNC: 3.5 G/DL (ref 2.8–4.4)
GLUCOSE BLD-MCNC: 145 MG/DL (ref 70–99)
GLUCOSE UR STRIP.AUTO-MCNC: NEGATIVE MG/DL
HCT VFR BLD AUTO: 44.3 %
HGB BLD-MCNC: 15.2 G/DL
IMM GRANULOCYTES # BLD AUTO: 0.02 X10(3) UL (ref 0–1)
IMM GRANULOCYTES NFR BLD: 0.3 %
KETONES UR STRIP.AUTO-MCNC: NEGATIVE MG/DL
LEUKOCYTE ESTERASE UR QL STRIP.AUTO: NEGATIVE
LYMPHOCYTES # BLD AUTO: 1.12 X10(3) UL (ref 1–4)
LYMPHOCYTES NFR BLD AUTO: 18.3 %
MCH RBC QN AUTO: 32.8 PG (ref 26–34)
MCHC RBC AUTO-ENTMCNC: 34.3 G/DL (ref 31–37)
MCV RBC AUTO: 95.5 FL
MONOCYTES # BLD AUTO: 0.59 X10(3) UL (ref 0.1–1)
MONOCYTES NFR BLD AUTO: 9.7 %
NEUTROPHILS # BLD AUTO: 4.32 X10 (3) UL (ref 1.5–7.7)
NEUTROPHILS # BLD AUTO: 4.32 X10(3) UL (ref 1.5–7.7)
NEUTROPHILS NFR BLD AUTO: 70.7 %
NITRITE UR QL STRIP.AUTO: NEGATIVE
OSMOLALITY SERPL CALC.SUM OF ELEC: 299 MOSM/KG (ref 275–295)
P AXIS: 77 DEGREES
P-R INTERVAL: 204 MS
PH UR STRIP.AUTO: 6 [PH] (ref 5–8)
PLATELET # BLD AUTO: 171 10(3)UL (ref 150–450)
POTASSIUM SERPL-SCNC: 3.8 MMOL/L (ref 3.5–5.1)
PROT SERPL-MCNC: 7 G/DL (ref 6.4–8.2)
PROT UR STRIP.AUTO-MCNC: NEGATIVE MG/DL
Q-T INTERVAL: 398 MS
QRS DURATION: 92 MS
QTC CALCULATION (BEZET): 444 MS
R AXIS: 17 DEGREES
RBC # BLD AUTO: 4.64 X10(6)UL
SODIUM SERPL-SCNC: 143 MMOL/L (ref 136–145)
SP GR UR STRIP.AUTO: 1.01 (ref 1–1.03)
T AXIS: 56 DEGREES
TROPONIN I HIGH SENSITIVITY: 6 NG/L
UROBILINOGEN UR STRIP.AUTO-MCNC: 0.2 MG/DL
VENTRICULAR RATE: 75 BPM
WBC # BLD AUTO: 6.1 X10(3) UL (ref 4–11)

## 2023-01-13 PROCEDURE — 99284 EMERGENCY DEPT VISIT MOD MDM: CPT

## 2023-01-13 PROCEDURE — 81001 URINALYSIS AUTO W/SCOPE: CPT | Performed by: EMERGENCY MEDICINE

## 2023-01-13 PROCEDURE — 80053 COMPREHEN METABOLIC PANEL: CPT | Performed by: EMERGENCY MEDICINE

## 2023-01-13 PROCEDURE — 93005 ELECTROCARDIOGRAM TRACING: CPT

## 2023-01-13 PROCEDURE — 85025 COMPLETE CBC W/AUTO DIFF WBC: CPT | Performed by: EMERGENCY MEDICINE

## 2023-01-13 PROCEDURE — 36415 COLL VENOUS BLD VENIPUNCTURE: CPT

## 2023-01-13 PROCEDURE — 93010 ELECTROCARDIOGRAM REPORT: CPT

## 2023-01-13 PROCEDURE — 84484 ASSAY OF TROPONIN QUANT: CPT | Performed by: EMERGENCY MEDICINE

## 2023-01-13 PROCEDURE — 81015 MICROSCOPIC EXAM OF URINE: CPT | Performed by: EMERGENCY MEDICINE

## 2023-01-13 RX ORDER — LEVOTHYROXINE SODIUM 0.07 MG/1
75 TABLET ORAL
COMMUNITY

## 2023-01-13 NOTE — ED INITIAL ASSESSMENT (HPI)
To the ED with c/o feeling odd after taking first dose of Levothyroxine at 9am. States feels like he is shaking, cold, headache and \" odd. \"

## 2023-01-13 NOTE — DISCHARGE INSTRUCTIONS
Follow-up with your PCP this week. Contact your primary care physician today to make adjustments on your medication. Return if symptoms worsen or new symptoms develop.

## 2023-01-25 ENCOUNTER — APPOINTMENT (OUTPATIENT)
Dept: ENDOCRINOLOGY | Age: 68
End: 2023-01-25

## 2023-02-22 ENCOUNTER — LAB ENCOUNTER (OUTPATIENT)
Dept: LAB | Facility: HOSPITAL | Age: 68
End: 2023-02-22
Attending: NURSE PRACTITIONER
Payer: MEDICARE

## 2023-02-22 DIAGNOSIS — R19.7 DIARRHEA OF PRESUMED INFECTIOUS ORIGIN: Primary | ICD-10-CM

## 2023-02-22 DIAGNOSIS — K92.1 BLOOD IN STOOL: ICD-10-CM

## 2023-02-22 LAB
BASOPHILS # BLD AUTO: 0.04 X10(3) UL (ref 0–0.2)
BASOPHILS NFR BLD AUTO: 0.8 %
EOSINOPHIL # BLD AUTO: 0.13 X10(3) UL (ref 0–0.7)
EOSINOPHIL NFR BLD AUTO: 2.5 %
ERYTHROCYTE [DISTWIDTH] IN BLOOD BY AUTOMATED COUNT: 12.6 %
HCT VFR BLD AUTO: 43.4 %
HGB BLD-MCNC: 14.9 G/DL
IMM GRANULOCYTES # BLD AUTO: 0.01 X10(3) UL (ref 0–1)
IMM GRANULOCYTES NFR BLD: 0.2 %
LYMPHOCYTES # BLD AUTO: 1.25 X10(3) UL (ref 1–4)
LYMPHOCYTES NFR BLD AUTO: 24 %
MCH RBC QN AUTO: 33.4 PG (ref 26–34)
MCHC RBC AUTO-ENTMCNC: 34.3 G/DL (ref 31–37)
MCV RBC AUTO: 97.3 FL
MONOCYTES # BLD AUTO: 0.6 X10(3) UL (ref 0.1–1)
MONOCYTES NFR BLD AUTO: 11.5 %
NEUTROPHILS # BLD AUTO: 3.18 X10 (3) UL (ref 1.5–7.7)
NEUTROPHILS # BLD AUTO: 3.18 X10(3) UL (ref 1.5–7.7)
NEUTROPHILS NFR BLD AUTO: 61 %
PLATELET # BLD AUTO: 173 10(3)UL (ref 150–450)
RBC # BLD AUTO: 4.46 X10(6)UL
WBC # BLD AUTO: 5.2 X10(3) UL (ref 4–11)

## 2023-02-22 PROCEDURE — 85025 COMPLETE CBC W/AUTO DIFF WBC: CPT

## 2023-02-22 PROCEDURE — 36415 COLL VENOUS BLD VENIPUNCTURE: CPT

## 2023-02-27 ENCOUNTER — LAB ENCOUNTER (OUTPATIENT)
Dept: LAB | Facility: HOSPITAL | Age: 68
End: 2023-02-27
Attending: NURSE PRACTITIONER
Payer: MEDICARE

## 2023-02-27 DIAGNOSIS — R19.7 DIARRHEA: Primary | ICD-10-CM

## 2023-02-27 DIAGNOSIS — K92.1 MELENA: ICD-10-CM

## 2023-02-27 DIAGNOSIS — K92.1 BLOOD IN STOOL: ICD-10-CM

## 2023-02-27 DIAGNOSIS — R19.7 DIARRHEA OF PRESUMED INFECTIOUS ORIGIN: ICD-10-CM

## 2023-02-27 LAB
CRYPTOSP AG STL QL IA: NEGATIVE
G LAMBLIA AG STL QL IA: NEGATIVE

## 2023-02-27 PROCEDURE — 87046 STOOL CULTR AEROBIC BACT EA: CPT

## 2023-02-27 PROCEDURE — 87329 GIARDIA AG IA: CPT

## 2023-02-27 PROCEDURE — 87045 FECES CULTURE AEROBIC BACT: CPT

## 2023-02-27 PROCEDURE — 87209 SMEAR COMPLEX STAIN: CPT

## 2023-02-27 PROCEDURE — 87207 SMEAR SPECIAL STAIN: CPT

## 2023-02-27 PROCEDURE — 87427 SHIGA-LIKE TOXIN AG IA: CPT

## 2023-02-27 PROCEDURE — 89055 LEUKOCYTE ASSESSMENT FECAL: CPT

## 2023-02-27 PROCEDURE — 87177 OVA AND PARASITES SMEARS: CPT

## 2023-02-27 PROCEDURE — 87015 SPECIMEN INFECT AGNT CONCNTJ: CPT

## 2023-02-27 PROCEDURE — 87272 CRYPTOSPORIDIUM AG IF: CPT

## 2023-02-28 ENCOUNTER — LAB ENCOUNTER (OUTPATIENT)
Dept: LAB | Facility: HOSPITAL | Age: 68
End: 2023-02-28
Attending: NURSE PRACTITIONER
Payer: MEDICARE

## 2023-02-28 DIAGNOSIS — K92.1 MELENA: ICD-10-CM

## 2023-02-28 DIAGNOSIS — R19.7 DIARRHEA: ICD-10-CM

## 2023-02-28 LAB — HEMOCCULT STL QL: NEGATIVE

## 2023-02-28 PROCEDURE — 82274 ASSAY TEST FOR BLOOD FECAL: CPT

## 2023-03-01 LAB — CYCLOSPORA STAIN: NEGATIVE

## 2023-03-03 LAB — OVA AND PARASITE, FECAL INTERPRETATION: NEGATIVE

## 2023-03-08 ENCOUNTER — HOSPITAL ENCOUNTER (EMERGENCY)
Facility: HOSPITAL | Age: 68
Discharge: HOME | End: 2023-03-08
Attending: EMERGENCY MEDICINE
Payer: MEDICARE

## 2023-03-08 VITALS
SYSTOLIC BLOOD PRESSURE: 129 MMHG | TEMPERATURE: 98 F | DIASTOLIC BLOOD PRESSURE: 74 MMHG | HEIGHT: 68 IN | WEIGHT: 133 LBS | OXYGEN SATURATION: 96 % | HEART RATE: 76 BPM | BODY MASS INDEX: 20.16 KG/M2 | RESPIRATION RATE: 18 BRPM

## 2023-03-08 DIAGNOSIS — K04.7 DENTAL ABSCESS: Primary | ICD-10-CM

## 2023-03-08 PROCEDURE — 99284 EMERGENCY DEPT VISIT MOD MDM: CPT

## 2023-03-08 PROCEDURE — 99283 EMERGENCY DEPT VISIT LOW MDM: CPT

## 2023-03-08 RX ORDER — AMOXICILLIN 500 MG/1
500 CAPSULE ORAL ONCE
Status: COMPLETED | OUTPATIENT
Start: 2023-03-08 | End: 2023-03-08

## 2023-03-08 RX ORDER — DIPHENHYDRAMINE HCL 25 MG
25 CAPSULE ORAL ONCE
Status: COMPLETED | OUTPATIENT
Start: 2023-03-08 | End: 2023-03-08

## 2023-03-08 RX ORDER — DIPHENHYDRAMINE HCL 25 MG
25 CAPSULE ORAL EVERY 6 HOURS PRN
Qty: 30 CAPSULE | Refills: 0 | Status: SHIPPED | OUTPATIENT
Start: 2023-03-08

## 2023-03-08 RX ORDER — AMOXICILLIN 500 MG/1
500 TABLET, FILM COATED ORAL 3 TIMES DAILY
Qty: 30 TABLET | Refills: 0 | Status: SHIPPED | OUTPATIENT
Start: 2023-03-08 | End: 2023-03-18

## 2023-03-08 NOTE — ED INITIAL ASSESSMENT (HPI)
Patient here with tooth extraction with bone graft on 2/16/23. Had follow up on 2/27/23 and told the surgeon he had a gum infection and ear ache and was told it was normal. Patient has ENT referral and went to urgent care today and was told to have his dentist look at it. Now patient is noticing some pus coming from the gums.

## 2023-03-28 ENCOUNTER — LAB ENCOUNTER (OUTPATIENT)
Dept: LAB | Facility: HOSPITAL | Age: 68
End: 2023-03-28
Attending: INTERNAL MEDICINE
Payer: MEDICARE

## 2023-03-28 DIAGNOSIS — E03.9 ACQUIRED HYPOTHYROIDISM: Primary | ICD-10-CM

## 2023-03-28 LAB — TSI SER-ACNC: 1.17 MIU/ML (ref 0.36–3.74)

## 2023-03-28 PROCEDURE — 84443 ASSAY THYROID STIM HORMONE: CPT

## 2023-03-28 PROCEDURE — 36415 COLL VENOUS BLD VENIPUNCTURE: CPT

## 2023-04-04 ENCOUNTER — LAB ENCOUNTER (OUTPATIENT)
Dept: LAB | Facility: HOSPITAL | Age: 68
End: 2023-04-04
Attending: INTERNAL MEDICINE
Payer: MEDICARE

## 2023-04-04 DIAGNOSIS — R68.89 COLD INTOLERANCE OF HAND: Primary | ICD-10-CM

## 2023-04-04 DIAGNOSIS — Z78.9 VEGAN DIET: ICD-10-CM

## 2023-04-04 LAB
DEPRECATED HBV CORE AB SER IA-ACNC: 52.6 NG/ML
IRON SATN MFR SERPL: 23 %
IRON SERPL-MCNC: 88 UG/DL
TIBC SERPL-MCNC: 384 UG/DL (ref 240–450)
TRANSFERRIN SERPL-MCNC: 258 MG/DL (ref 200–360)
VIT B12 SERPL-MCNC: 1042 PG/ML (ref 193–986)

## 2023-04-04 PROCEDURE — 82607 VITAMIN B-12: CPT

## 2023-04-04 PROCEDURE — 82728 ASSAY OF FERRITIN: CPT

## 2023-04-04 PROCEDURE — 83540 ASSAY OF IRON: CPT

## 2023-04-04 PROCEDURE — 83550 IRON BINDING TEST: CPT

## 2023-04-04 PROCEDURE — 36415 COLL VENOUS BLD VENIPUNCTURE: CPT

## 2023-06-14 ENCOUNTER — LAB ENCOUNTER (OUTPATIENT)
Dept: LAB | Facility: HOSPITAL | Age: 68
End: 2023-06-14
Attending: FAMILY MEDICINE
Payer: MEDICARE

## 2023-06-14 DIAGNOSIS — R60.0 LOCALIZED EDEMA: Primary | ICD-10-CM

## 2023-06-14 LAB
ALBUMIN SERPL-MCNC: 3.3 G/DL (ref 3.4–5)
ALBUMIN/GLOB SERPL: 0.9 {RATIO} (ref 1–2)
ALP LIVER SERPL-CCNC: 60 U/L
ALT SERPL-CCNC: 35 U/L
ANION GAP SERPL CALC-SCNC: 4 MMOL/L (ref 0–18)
AST SERPL-CCNC: 17 U/L (ref 15–37)
BASOPHILS # BLD AUTO: 0.06 X10(3) UL (ref 0–0.2)
BASOPHILS NFR BLD AUTO: 1 %
BILIRUB SERPL-MCNC: 0.7 MG/DL (ref 0.1–2)
BUN BLD-MCNC: 13 MG/DL (ref 7–18)
CALCIUM BLD-MCNC: 9 MG/DL (ref 8.5–10.1)
CHLORIDE SERPL-SCNC: 106 MMOL/L (ref 98–112)
CO2 SERPL-SCNC: 29 MMOL/L (ref 21–32)
CREAT BLD-MCNC: 0.92 MG/DL
EOSINOPHIL # BLD AUTO: 0.13 X10(3) UL (ref 0–0.7)
EOSINOPHIL NFR BLD AUTO: 2.1 %
ERYTHROCYTE [DISTWIDTH] IN BLOOD BY AUTOMATED COUNT: 12.9 %
FASTING STATUS PATIENT QL REPORTED: YES
GFR SERPLBLD BASED ON 1.73 SQ M-ARVRAT: 91 ML/MIN/1.73M2 (ref 60–?)
GLOBULIN PLAS-MCNC: 3.5 G/DL (ref 2.8–4.4)
GLUCOSE BLD-MCNC: 94 MG/DL (ref 70–99)
HCT VFR BLD AUTO: 46.3 %
HGB BLD-MCNC: 15.5 G/DL
IMM GRANULOCYTES # BLD AUTO: 0.03 X10(3) UL (ref 0–1)
IMM GRANULOCYTES NFR BLD: 0.5 %
LYMPHOCYTES # BLD AUTO: 1.65 X10(3) UL (ref 1–4)
LYMPHOCYTES NFR BLD AUTO: 26.4 %
MCH RBC QN AUTO: 32.3 PG (ref 26–34)
MCHC RBC AUTO-ENTMCNC: 33.5 G/DL (ref 31–37)
MCV RBC AUTO: 96.5 FL
MONOCYTES # BLD AUTO: 0.71 X10(3) UL (ref 0.1–1)
MONOCYTES NFR BLD AUTO: 11.4 %
NEUTROPHILS # BLD AUTO: 3.67 X10 (3) UL (ref 1.5–7.7)
NEUTROPHILS # BLD AUTO: 3.67 X10(3) UL (ref 1.5–7.7)
NEUTROPHILS NFR BLD AUTO: 58.6 %
OSMOLALITY SERPL CALC.SUM OF ELEC: 288 MOSM/KG (ref 275–295)
PLATELET # BLD AUTO: 160 10(3)UL (ref 150–450)
POTASSIUM SERPL-SCNC: 3.8 MMOL/L (ref 3.5–5.1)
PROT SERPL-MCNC: 6.8 G/DL (ref 6.4–8.2)
RBC # BLD AUTO: 4.8 X10(6)UL
SODIUM SERPL-SCNC: 139 MMOL/L (ref 136–145)
TSI SER-ACNC: 1.74 MIU/ML (ref 0.36–3.74)
VIT B12 SERPL-MCNC: 883 PG/ML (ref 193–986)
WBC # BLD AUTO: 6.3 X10(3) UL (ref 4–11)

## 2023-06-14 PROCEDURE — 80053 COMPREHEN METABOLIC PANEL: CPT

## 2023-06-14 PROCEDURE — 84443 ASSAY THYROID STIM HORMONE: CPT

## 2023-06-14 PROCEDURE — 82607 VITAMIN B-12: CPT

## 2023-06-14 PROCEDURE — 36415 COLL VENOUS BLD VENIPUNCTURE: CPT

## 2023-06-14 PROCEDURE — 85025 COMPLETE CBC W/AUTO DIFF WBC: CPT

## 2023-06-16 ENCOUNTER — LAB ENCOUNTER (OUTPATIENT)
Dept: LAB | Facility: HOSPITAL | Age: 68
End: 2023-06-16
Attending: FAMILY MEDICINE
Payer: MEDICARE

## 2023-06-16 DIAGNOSIS — L60.9 NAIL DISORDER: ICD-10-CM

## 2023-06-16 DIAGNOSIS — R60.9 EDEMA, UNSPECIFIED TYPE: ICD-10-CM

## 2023-06-16 DIAGNOSIS — E55.9 VITAMIN D DEFICIENCY: Primary | ICD-10-CM

## 2023-06-16 DIAGNOSIS — R53.83 FATIGUE: ICD-10-CM

## 2023-06-16 LAB — VIT D+METAB SERPL-MCNC: 59 NG/ML (ref 30–100)

## 2023-06-16 PROCEDURE — 82306 VITAMIN D 25 HYDROXY: CPT

## 2023-06-16 PROCEDURE — 36415 COLL VENOUS BLD VENIPUNCTURE: CPT

## 2023-06-20 ENCOUNTER — HOSPITAL ENCOUNTER (OUTPATIENT)
Dept: MRI IMAGING | Facility: HOSPITAL | Age: 68
Discharge: HOME | End: 2023-06-20
Attending: ORTHOPAEDIC SURGERY
Payer: MEDICARE

## 2023-06-20 DIAGNOSIS — M25.551 PAIN IN JOINT OF RIGHT HIP: ICD-10-CM

## 2023-06-20 PROCEDURE — 73721 MRI JNT OF LWR EXTRE W/O DYE: CPT | Performed by: ORTHOPAEDIC SURGERY

## 2023-06-27 ENCOUNTER — LAB ENCOUNTER (OUTPATIENT)
Dept: LAB | Facility: HOSPITAL | Age: 68
End: 2023-06-27
Attending: INTERNAL MEDICINE
Payer: MEDICARE

## 2023-06-27 DIAGNOSIS — R60.0 LOCALIZED EDEMA: Primary | ICD-10-CM

## 2023-06-27 LAB — NT-PROBNP SERPL-MCNC: 142 PG/ML (ref ?–125)

## 2023-06-27 PROCEDURE — 83880 ASSAY OF NATRIURETIC PEPTIDE: CPT

## 2023-06-27 PROCEDURE — 36415 COLL VENOUS BLD VENIPUNCTURE: CPT

## 2023-07-05 ENCOUNTER — HOSPITAL ENCOUNTER (OUTPATIENT)
Dept: MRI IMAGING | Facility: HOSPITAL | Age: 68
Discharge: HOME | End: 2023-07-05
Attending: ORTHOPAEDIC SURGERY
Payer: MEDICARE

## 2023-07-05 DIAGNOSIS — M25.551 PAIN IN JOINT OF RIGHT HIP: ICD-10-CM

## 2023-07-05 PROCEDURE — 73721 MRI JNT OF LWR EXTRE W/O DYE: CPT | Performed by: ORTHOPAEDIC SURGERY

## 2023-07-12 ENCOUNTER — HOSPITAL ENCOUNTER (EMERGENCY)
Facility: HOSPITAL | Age: 68
Discharge: HOME | End: 2023-07-12
Attending: EMERGENCY MEDICINE
Payer: MEDICARE

## 2023-07-12 ENCOUNTER — APPOINTMENT (OUTPATIENT)
Dept: GENERAL RADIOLOGY | Facility: HOSPITAL | Age: 68
End: 2023-07-12
Attending: EMERGENCY MEDICINE
Payer: MEDICARE

## 2023-07-12 ENCOUNTER — OFFICE VISIT (OUTPATIENT)
Facility: CLINIC | Age: 68
End: 2023-07-12
Payer: MEDICARE

## 2023-07-12 VITALS
TEMPERATURE: 97 F | BODY MASS INDEX: 22 KG/M2 | OXYGEN SATURATION: 97 % | SYSTOLIC BLOOD PRESSURE: 124 MMHG | RESPIRATION RATE: 22 BRPM | HEART RATE: 63 BPM | DIASTOLIC BLOOD PRESSURE: 65 MMHG | WEIGHT: 145 LBS

## 2023-07-12 VITALS
BODY MASS INDEX: 21.98 KG/M2 | SYSTOLIC BLOOD PRESSURE: 112 MMHG | OXYGEN SATURATION: 97 % | HEART RATE: 83 BPM | WEIGHT: 145 LBS | HEIGHT: 68 IN | RESPIRATION RATE: 18 BRPM | DIASTOLIC BLOOD PRESSURE: 72 MMHG

## 2023-07-12 DIAGNOSIS — R06.00 DYSPNEA AND RESPIRATORY ABNORMALITIES: Primary | ICD-10-CM

## 2023-07-12 DIAGNOSIS — R06.89 DYSPNEA AND RESPIRATORY ABNORMALITIES: Primary | ICD-10-CM

## 2023-07-12 DIAGNOSIS — R06.00 DYSPNEA, UNSPECIFIED TYPE: Primary | ICD-10-CM

## 2023-07-12 LAB
ALBUMIN SERPL-MCNC: 3.5 G/DL (ref 3.4–5)
ALBUMIN/GLOB SERPL: 1 {RATIO} (ref 1–2)
ALP LIVER SERPL-CCNC: 59 U/L
ALT SERPL-CCNC: 38 U/L
ANION GAP SERPL CALC-SCNC: 1 MMOL/L (ref 0–18)
AST SERPL-CCNC: 24 U/L (ref 15–37)
ATRIAL RATE: 61 BPM
BASOPHILS # BLD AUTO: 0.07 X10(3) UL (ref 0–0.2)
BASOPHILS NFR BLD AUTO: 1.1 %
BILIRUB SERPL-MCNC: 0.9 MG/DL (ref 0.1–2)
BUN BLD-MCNC: 17 MG/DL (ref 7–18)
CALCIUM BLD-MCNC: 9 MG/DL (ref 8.5–10.1)
CHLORIDE SERPL-SCNC: 109 MMOL/L (ref 98–112)
CO2 SERPL-SCNC: 31 MMOL/L (ref 21–32)
CREAT BLD-MCNC: 0.94 MG/DL
D DIMER PPP FEU-MCNC: 0.36 UG/ML FEU (ref ?–0.68)
EOSINOPHIL # BLD AUTO: 0.09 X10(3) UL (ref 0–0.7)
EOSINOPHIL NFR BLD AUTO: 1.5 %
ERYTHROCYTE [DISTWIDTH] IN BLOOD BY AUTOMATED COUNT: 13 %
GFR SERPLBLD BASED ON 1.73 SQ M-ARVRAT: 88 ML/MIN/1.73M2 (ref 60–?)
GLOBULIN PLAS-MCNC: 3.6 G/DL (ref 2.8–4.4)
GLUCOSE BLD-MCNC: 102 MG/DL (ref 70–99)
HCT VFR BLD AUTO: 45.4 %
HGB BLD-MCNC: 15.3 G/DL
IMM GRANULOCYTES # BLD AUTO: 0.01 X10(3) UL (ref 0–1)
IMM GRANULOCYTES NFR BLD: 0.2 %
LYMPHOCYTES # BLD AUTO: 1.6 X10(3) UL (ref 1–4)
LYMPHOCYTES NFR BLD AUTO: 26.1 %
MCH RBC QN AUTO: 32.2 PG (ref 26–34)
MCHC RBC AUTO-ENTMCNC: 33.7 G/DL (ref 31–37)
MCV RBC AUTO: 95.6 FL
MONOCYTES # BLD AUTO: 1.04 X10(3) UL (ref 0.1–1)
MONOCYTES NFR BLD AUTO: 16.9 %
NEUTROPHILS # BLD AUTO: 3.33 X10 (3) UL (ref 1.5–7.7)
NEUTROPHILS # BLD AUTO: 3.33 X10(3) UL (ref 1.5–7.7)
NEUTROPHILS NFR BLD AUTO: 54.2 %
NT-PROBNP SERPL-MCNC: 141 PG/ML (ref ?–125)
OSMOLALITY SERPL CALC.SUM OF ELEC: 294 MOSM/KG (ref 275–295)
P AXIS: 52 DEGREES
P-R INTERVAL: 224 MS
PLATELET # BLD AUTO: 163 10(3)UL (ref 150–450)
POTASSIUM SERPL-SCNC: 3.6 MMOL/L (ref 3.5–5.1)
PROT SERPL-MCNC: 7.1 G/DL (ref 6.4–8.2)
Q-T INTERVAL: 414 MS
QRS DURATION: 122 MS
QTC CALCULATION (BEZET): 416 MS
R AXIS: 16 DEGREES
RBC # BLD AUTO: 4.75 X10(6)UL
SODIUM SERPL-SCNC: 141 MMOL/L (ref 136–145)
T AXIS: 48 DEGREES
TROPONIN I HIGH SENSITIVITY: 6 NG/L
VENTRICULAR RATE: 61 BPM
WBC # BLD AUTO: 6.1 X10(3) UL (ref 4–11)

## 2023-07-12 PROCEDURE — 85379 FIBRIN DEGRADATION QUANT: CPT | Performed by: EMERGENCY MEDICINE

## 2023-07-12 PROCEDURE — 85025 COMPLETE CBC W/AUTO DIFF WBC: CPT | Performed by: EMERGENCY MEDICINE

## 2023-07-12 PROCEDURE — 36415 COLL VENOUS BLD VENIPUNCTURE: CPT

## 2023-07-12 PROCEDURE — 71045 X-RAY EXAM CHEST 1 VIEW: CPT | Performed by: EMERGENCY MEDICINE

## 2023-07-12 PROCEDURE — 99284 EMERGENCY DEPT VISIT MOD MDM: CPT

## 2023-07-12 PROCEDURE — 93010 ELECTROCARDIOGRAM REPORT: CPT

## 2023-07-12 PROCEDURE — 83880 ASSAY OF NATRIURETIC PEPTIDE: CPT | Performed by: EMERGENCY MEDICINE

## 2023-07-12 PROCEDURE — 94640 AIRWAY INHALATION TREATMENT: CPT

## 2023-07-12 PROCEDURE — 84484 ASSAY OF TROPONIN QUANT: CPT | Performed by: EMERGENCY MEDICINE

## 2023-07-12 PROCEDURE — 80053 COMPREHEN METABOLIC PANEL: CPT | Performed by: EMERGENCY MEDICINE

## 2023-07-12 PROCEDURE — 93005 ELECTROCARDIOGRAM TRACING: CPT

## 2023-07-12 PROCEDURE — 99285 EMERGENCY DEPT VISIT HI MDM: CPT

## 2023-07-12 RX ORDER — ALBUTEROL SULFATE 90 UG/1
2 AEROSOL, METERED RESPIRATORY (INHALATION) EVERY 4 HOURS PRN
Qty: 1 EACH | Refills: 0 | Status: SHIPPED | OUTPATIENT
Start: 2023-07-12 | End: 2023-08-11

## 2023-07-12 RX ORDER — ALBUTEROL SULFATE 90 UG/1
4 AEROSOL, METERED RESPIRATORY (INHALATION) ONCE
Status: COMPLETED | OUTPATIENT
Start: 2023-07-12 | End: 2023-07-12

## 2023-07-12 NOTE — PROGRESS NOTES
Nurse demonstrated proper use of inhaler with chamber. Pt verbalize clear understanding with proper return demonstration. Concerns and questions addressed.

## 2023-07-12 NOTE — DISCHARGE INSTRUCTIONS
Possible asthma/COPD. Can try albuterol 2 puffs every 6 hours. Follow-up with your pulmonologist today.   Return to the ER if worsening symptoms or new complaints

## 2023-07-12 NOTE — PATIENT INSTRUCTIONS
Plan:  -- to begin albuterol inhaler 1-2 puffs prior to sleep and as needed for shortness of breath -   - call with any changes   - see me in 2 months     Natty Lambert MD  Pulmonary Medicine  7/12/2023

## 2023-07-12 NOTE — ED INITIAL ASSESSMENT (HPI)
A/O x 4. Patient presents with shortness of breath since last night. Patient denies any chest pain.   Patient denies any cough or fever

## 2023-07-24 ENCOUNTER — TELEPHONE (OUTPATIENT)
Dept: RHEUMATOLOGY | Facility: CLINIC | Age: 68
End: 2023-07-24

## 2023-07-24 NOTE — TELEPHONE ENCOUNTER
Called pt this morning and offered him to come in this morning at 10 pt declined then offered him to come in wed July 26 pt declined

## 2023-07-25 ENCOUNTER — LAB ENCOUNTER (OUTPATIENT)
Dept: LAB | Facility: HOSPITAL | Age: 68
End: 2023-07-25
Attending: FAMILY MEDICINE
Payer: MEDICARE

## 2023-07-25 DIAGNOSIS — D64.9 ANEMIA: ICD-10-CM

## 2023-07-25 DIAGNOSIS — R53.83 FATIGUE: ICD-10-CM

## 2023-07-25 DIAGNOSIS — I10 PRIMARY HYPERTENSION: ICD-10-CM

## 2023-07-25 DIAGNOSIS — R60.9 EDEMA: Primary | ICD-10-CM

## 2023-07-25 DIAGNOSIS — E03.9 HYPOTHYROIDISM: ICD-10-CM

## 2023-07-25 DIAGNOSIS — E78.5 HYPERLIPIDEMIA: ICD-10-CM

## 2023-07-25 DIAGNOSIS — L60.9 NAIL DISORDER: ICD-10-CM

## 2023-07-25 DIAGNOSIS — E55.9 VITAMIN D DEFICIENCY: ICD-10-CM

## 2023-07-25 DIAGNOSIS — Z12.5 SPECIAL SCREENING FOR MALIGNANT NEOPLASM OF PROSTATE: ICD-10-CM

## 2023-07-25 LAB
ALBUMIN SERPL-MCNC: 3.4 G/DL (ref 3.4–5)
ALBUMIN/GLOB SERPL: 1.1 {RATIO} (ref 1–2)
ALP LIVER SERPL-CCNC: 54 U/L
ALT SERPL-CCNC: 33 U/L
ANION GAP SERPL CALC-SCNC: 2 MMOL/L (ref 0–18)
AST SERPL-CCNC: 14 U/L (ref 15–37)
BASOPHILS # BLD AUTO: 0.05 X10(3) UL (ref 0–0.2)
BASOPHILS NFR BLD AUTO: 0.9 %
BILIRUB SERPL-MCNC: 0.6 MG/DL (ref 0.1–2)
BILIRUB UR QL STRIP.AUTO: NEGATIVE
BUN BLD-MCNC: 14 MG/DL (ref 7–18)
CALCIUM BLD-MCNC: 8.6 MG/DL (ref 8.5–10.1)
CHLORIDE SERPL-SCNC: 110 MMOL/L (ref 98–112)
CHOLEST SERPL-MCNC: 125 MG/DL (ref ?–200)
CLARITY UR REFRACT.AUTO: CLEAR
CO2 SERPL-SCNC: 29 MMOL/L (ref 21–32)
COLOR UR AUTO: YELLOW
CREAT BLD-MCNC: 0.95 MG/DL
EGFRCR SERPLBLD CKD-EPI 2021: 87 ML/MIN/1.73M2 (ref 60–?)
EOSINOPHIL # BLD AUTO: 0.11 X10(3) UL (ref 0–0.7)
EOSINOPHIL NFR BLD AUTO: 2.1 %
ERYTHROCYTE [DISTWIDTH] IN BLOOD BY AUTOMATED COUNT: 12.9 %
FASTING PATIENT LIPID ANSWER: YES
FASTING STATUS PATIENT QL REPORTED: YES
GLOBULIN PLAS-MCNC: 3.2 G/DL (ref 2.8–4.4)
GLUCOSE BLD-MCNC: 97 MG/DL (ref 70–99)
GLUCOSE UR STRIP.AUTO-MCNC: NEGATIVE MG/DL
HCT VFR BLD AUTO: 43 %
HDLC SERPL-MCNC: 43 MG/DL (ref 40–59)
HGB BLD-MCNC: 15.1 G/DL
IMM GRANULOCYTES # BLD AUTO: 0.02 X10(3) UL (ref 0–1)
IMM GRANULOCYTES NFR BLD: 0.4 %
IRON SERPL-MCNC: 119 UG/DL
LDLC SERPL CALC-MCNC: 65 MG/DL (ref ?–100)
LEUKOCYTE ESTERASE UR QL STRIP.AUTO: NEGATIVE
LYMPHOCYTES # BLD AUTO: 1.38 X10(3) UL (ref 1–4)
LYMPHOCYTES NFR BLD AUTO: 25.8 %
MCH RBC QN AUTO: 32.9 PG (ref 26–34)
MCHC RBC AUTO-ENTMCNC: 35.1 G/DL (ref 31–37)
MCV RBC AUTO: 93.7 FL
MONOCYTES # BLD AUTO: 0.64 X10(3) UL (ref 0.1–1)
MONOCYTES NFR BLD AUTO: 12 %
NEUTROPHILS # BLD AUTO: 3.15 X10 (3) UL (ref 1.5–7.7)
NEUTROPHILS # BLD AUTO: 3.15 X10(3) UL (ref 1.5–7.7)
NEUTROPHILS NFR BLD AUTO: 58.8 %
NITRITE UR QL STRIP.AUTO: NEGATIVE
NONHDLC SERPL-MCNC: 82 MG/DL (ref ?–130)
OSMOLALITY SERPL CALC.SUM OF ELEC: 292 MOSM/KG (ref 275–295)
PH UR STRIP.AUTO: 5 [PH] (ref 5–8)
PLATELET # BLD AUTO: 159 10(3)UL (ref 150–450)
POTASSIUM SERPL-SCNC: 4.3 MMOL/L (ref 3.5–5.1)
PROT SERPL-MCNC: 6.6 G/DL (ref 6.4–8.2)
PROT UR STRIP.AUTO-MCNC: NEGATIVE MG/DL
RBC # BLD AUTO: 4.59 X10(6)UL
RBC UR QL AUTO: NEGATIVE
SODIUM SERPL-SCNC: 141 MMOL/L (ref 136–145)
SP GR UR STRIP.AUTO: 1.02 (ref 1–1.03)
TRIGL SERPL-MCNC: 90 MG/DL (ref 30–149)
TSI SER-ACNC: 1.1 MIU/ML (ref 0.36–3.74)
UROBILINOGEN UR STRIP.AUTO-MCNC: <2 MG/DL
VIT B12 SERPL-MCNC: 723 PG/ML (ref 193–986)
VIT D+METAB SERPL-MCNC: 59.7 NG/ML (ref 30–100)
VLDLC SERPL CALC-MCNC: 13 MG/DL (ref 0–30)
WBC # BLD AUTO: 5.4 X10(3) UL (ref 4–11)

## 2023-07-25 PROCEDURE — 84443 ASSAY THYROID STIM HORMONE: CPT

## 2023-07-25 PROCEDURE — 80053 COMPREHEN METABOLIC PANEL: CPT

## 2023-07-25 PROCEDURE — 83540 ASSAY OF IRON: CPT

## 2023-07-25 PROCEDURE — 81001 URINALYSIS AUTO W/SCOPE: CPT

## 2023-07-25 PROCEDURE — 80061 LIPID PANEL: CPT

## 2023-07-25 PROCEDURE — 82306 VITAMIN D 25 HYDROXY: CPT

## 2023-07-25 PROCEDURE — 85025 COMPLETE CBC W/AUTO DIFF WBC: CPT

## 2023-07-25 PROCEDURE — 82607 VITAMIN B-12: CPT

## 2023-07-25 PROCEDURE — 36415 COLL VENOUS BLD VENIPUNCTURE: CPT

## 2023-09-12 ENCOUNTER — OFFICE VISIT (OUTPATIENT)
Facility: CLINIC | Age: 68
End: 2023-09-12
Payer: MEDICARE

## 2023-09-12 VITALS
HEART RATE: 68 BPM | SYSTOLIC BLOOD PRESSURE: 118 MMHG | RESPIRATION RATE: 16 BRPM | HEIGHT: 68 IN | OXYGEN SATURATION: 98 % | DIASTOLIC BLOOD PRESSURE: 56 MMHG | WEIGHT: 146 LBS | BODY MASS INDEX: 22.13 KG/M2

## 2023-09-12 DIAGNOSIS — R06.00 DYSPNEA AND RESPIRATORY ABNORMALITIES: Primary | ICD-10-CM

## 2023-09-12 DIAGNOSIS — R06.89 DYSPNEA AND RESPIRATORY ABNORMALITIES: Primary | ICD-10-CM

## 2023-09-12 PROCEDURE — 99214 OFFICE O/P EST MOD 30 MIN: CPT | Performed by: INTERNAL MEDICINE

## 2023-09-13 ENCOUNTER — OFFICE VISIT (OUTPATIENT)
Dept: NEUROLOGY | Facility: CLINIC | Age: 68
End: 2023-09-13
Payer: MEDICARE

## 2023-09-13 VITALS
BODY MASS INDEX: 22 KG/M2 | WEIGHT: 146 LBS | DIASTOLIC BLOOD PRESSURE: 64 MMHG | HEART RATE: 64 BPM | SYSTOLIC BLOOD PRESSURE: 124 MMHG | RESPIRATION RATE: 16 BRPM | OXYGEN SATURATION: 98 %

## 2023-09-13 DIAGNOSIS — R20.0 NUMBNESS IN FEET: Primary | ICD-10-CM

## 2023-09-13 PROCEDURE — 99214 OFFICE O/P EST MOD 30 MIN: CPT | Performed by: OTHER

## 2023-09-28 ENCOUNTER — OFFICE VISIT (OUTPATIENT)
Dept: RHEUMATOLOGY | Facility: CLINIC | Age: 68
End: 2023-09-28
Payer: MEDICARE

## 2023-09-28 VITALS
WEIGHT: 148.63 LBS | BODY MASS INDEX: 22.53 KG/M2 | HEART RATE: 74 BPM | DIASTOLIC BLOOD PRESSURE: 58 MMHG | TEMPERATURE: 98 F | OXYGEN SATURATION: 96 % | RESPIRATION RATE: 16 BRPM | SYSTOLIC BLOOD PRESSURE: 110 MMHG | HEIGHT: 68 IN

## 2023-09-28 DIAGNOSIS — M79.641 PAIN IN BOTH HANDS: ICD-10-CM

## 2023-09-28 DIAGNOSIS — G62.9 NEUROPATHY: Primary | ICD-10-CM

## 2023-09-28 DIAGNOSIS — G60.3 IDIOPATHIC PROGRESSIVE NEUROPATHY: ICD-10-CM

## 2023-09-28 DIAGNOSIS — R20.0 NUMBNESS AND TINGLING OF BOTH FEET: ICD-10-CM

## 2023-09-28 DIAGNOSIS — I73.9 PERIPHERAL VASCULAR DISEASE (HCC): ICD-10-CM

## 2023-09-28 DIAGNOSIS — M35.01 SICCA SYNDROME WITH KERATOCONJUNCTIVITIS (HCC): ICD-10-CM

## 2023-09-28 DIAGNOSIS — R20.2 NUMBNESS AND TINGLING OF BOTH FEET: ICD-10-CM

## 2023-09-28 DIAGNOSIS — M79.642 PAIN IN BOTH HANDS: ICD-10-CM

## 2023-09-28 PROCEDURE — 99214 OFFICE O/P EST MOD 30 MIN: CPT | Performed by: INTERNAL MEDICINE

## 2023-09-28 NOTE — PATIENT INSTRUCTIONS
Current plan to see your coldness in your feet, neuropathy in your feet, concerns about circulation in your feet let us order arterial Doppler flows of your feet. This will check the arteries in your feet make sure you have adequate blood flow. Also because of neuropathy and numbness and tingling in your feet, we will check a lead level and mercury level. Also we will recheck the sed rate which is inflammation test along with your Sjogren's antibodies because of your dry eyes dry mouth. As you know for dry eyes dry mouth drink plenty of water, use artificial tears, you can use Biotene products. There are pharmaceutical treatments for dry mouth such as pilocarpine. For aches and pains you can either use extra strength Tylenol or ibuprofen. If the neuropathy in your feet gets severe you could take a medication called gabapentin or Lyrica to try to block the nerve pain. Do your lab tests and I will call you with the results. I will also call you with the results of the arterial Doppler flow test.  Continue to follow a Mediterranean low-fat low-salt diet. Return to office 1 year.

## 2023-10-02 ENCOUNTER — LAB ENCOUNTER (OUTPATIENT)
Dept: LAB | Facility: HOSPITAL | Age: 68
End: 2023-10-02
Attending: INTERNAL MEDICINE
Payer: MEDICARE

## 2023-10-02 DIAGNOSIS — E55.9 VITAMIN D DEFICIENCY: ICD-10-CM

## 2023-10-02 DIAGNOSIS — L60.9 NAIL DISORDER: ICD-10-CM

## 2023-10-02 DIAGNOSIS — Z12.5 SPECIAL SCREENING FOR MALIGNANT NEOPLASM OF PROSTATE: ICD-10-CM

## 2023-10-02 DIAGNOSIS — E03.9 HYPOTHYROIDISM: ICD-10-CM

## 2023-10-02 DIAGNOSIS — I73.9 PERIPHERAL VASCULAR DISEASE (HCC): ICD-10-CM

## 2023-10-02 DIAGNOSIS — G60.3 IDIOPATHIC PROGRESSIVE NEUROPATHY: ICD-10-CM

## 2023-10-02 DIAGNOSIS — R82.90 NONSPECIFIC FINDING ON EXAMINATION OF URINE: Primary | ICD-10-CM

## 2023-10-02 DIAGNOSIS — E78.5 HYPERLIPIDEMIA: ICD-10-CM

## 2023-10-02 DIAGNOSIS — I10 PRIMARY HYPERTENSION: ICD-10-CM

## 2023-10-02 DIAGNOSIS — G62.9 NEUROPATHY: ICD-10-CM

## 2023-10-02 DIAGNOSIS — R53.83 FATIGUE: ICD-10-CM

## 2023-10-02 DIAGNOSIS — R60.9 EDEMA: ICD-10-CM

## 2023-10-02 DIAGNOSIS — D64.9 ANEMIA: ICD-10-CM

## 2023-10-02 DIAGNOSIS — M35.01 SICCA SYNDROME WITH KERATOCONJUNCTIVITIS (HCC): ICD-10-CM

## 2023-10-02 LAB
BILIRUB UR QL STRIP.AUTO: NEGATIVE
COMPLEXED PSA SERPL-MCNC: 1.96 NG/ML (ref ?–4)
ERYTHROCYTE [SEDIMENTATION RATE] IN BLOOD: 7 MM/HR
GLUCOSE UR STRIP.AUTO-MCNC: NORMAL MG/DL
KETONES UR STRIP.AUTO-MCNC: NEGATIVE MG/DL
LEUKOCYTE ESTERASE UR QL STRIP.AUTO: NEGATIVE
NITRITE UR QL STRIP.AUTO: NEGATIVE
PH UR STRIP.AUTO: 8 [PH] (ref 5–8)
PROT UR STRIP.AUTO-MCNC: NEGATIVE MG/DL
RBC UR QL AUTO: NEGATIVE
SP GR UR STRIP.AUTO: 1.02 (ref 1–1.03)
UROBILINOGEN UR STRIP.AUTO-MCNC: NORMAL MG/DL

## 2023-10-02 PROCEDURE — 36415 COLL VENOUS BLD VENIPUNCTURE: CPT

## 2023-10-02 PROCEDURE — 86235 NUCLEAR ANTIGEN ANTIBODY: CPT

## 2023-10-02 PROCEDURE — 85652 RBC SED RATE AUTOMATED: CPT

## 2023-10-02 PROCEDURE — 83825 ASSAY OF MERCURY: CPT

## 2023-10-02 PROCEDURE — 83735 ASSAY OF MAGNESIUM: CPT | Performed by: EMERGENCY MEDICINE

## 2023-10-02 PROCEDURE — 83655 ASSAY OF LEAD: CPT

## 2023-10-02 PROCEDURE — 81001 URINALYSIS AUTO W/SCOPE: CPT

## 2023-10-03 LAB
ENA SS-A IGG SER IA-ACNC: <0.4 U/ML
ENA SS-B IGG SER IA-ACNC: 0.4 U/ML
LEAD BLOOD ADULT: <1 UG/DL

## 2023-10-04 LAB — MERCURY BLOOD: 1 UG/L

## 2023-10-06 ENCOUNTER — APPOINTMENT (OUTPATIENT)
Dept: MRI IMAGING | Facility: HOSPITAL | Age: 68
End: 2023-10-06
Attending: EMERGENCY MEDICINE
Payer: MEDICARE

## 2023-10-06 ENCOUNTER — TELEPHONE (OUTPATIENT)
Dept: NEUROLOGY | Facility: CLINIC | Age: 68
End: 2023-10-06

## 2023-10-06 ENCOUNTER — HOSPITAL ENCOUNTER (EMERGENCY)
Facility: HOSPITAL | Age: 68
Discharge: HOME | End: 2023-10-06
Attending: EMERGENCY MEDICINE
Payer: MEDICARE

## 2023-10-06 VITALS
RESPIRATION RATE: 18 BRPM | DIASTOLIC BLOOD PRESSURE: 87 MMHG | TEMPERATURE: 98 F | SYSTOLIC BLOOD PRESSURE: 147 MMHG | HEART RATE: 63 BPM | OXYGEN SATURATION: 98 %

## 2023-10-06 DIAGNOSIS — R20.2 PARESTHESIAS: Primary | ICD-10-CM

## 2023-10-06 LAB
ALBUMIN SERPL-MCNC: 3.3 G/DL (ref 3.4–5)
ALBUMIN/GLOB SERPL: 0.8 {RATIO} (ref 1–2)
ALP LIVER SERPL-CCNC: 66 U/L
ALT SERPL-CCNC: 38 U/L
ANION GAP SERPL CALC-SCNC: 2 MMOL/L (ref 0–18)
AST SERPL-CCNC: 16 U/L (ref 15–37)
BASOPHILS # BLD AUTO: 0.08 X10(3) UL (ref 0–0.2)
BASOPHILS NFR BLD AUTO: 1.3 %
BILIRUB SERPL-MCNC: 0.4 MG/DL (ref 0.1–2)
BUN BLD-MCNC: 16 MG/DL (ref 7–18)
CALCIUM BLD-MCNC: 8.8 MG/DL (ref 8.5–10.1)
CHLORIDE SERPL-SCNC: 107 MMOL/L (ref 98–112)
CO2 SERPL-SCNC: 30 MMOL/L (ref 21–32)
CREAT BLD-MCNC: 0.89 MG/DL
EGFRCR SERPLBLD CKD-EPI 2021: 93 ML/MIN/1.73M2 (ref 60–?)
EOSINOPHIL # BLD AUTO: 0.31 X10(3) UL (ref 0–0.7)
EOSINOPHIL NFR BLD AUTO: 4.9 %
ERYTHROCYTE [DISTWIDTH] IN BLOOD BY AUTOMATED COUNT: 12.8 %
GLOBULIN PLAS-MCNC: 3.9 G/DL (ref 2.8–4.4)
GLUCOSE BLD-MCNC: 103 MG/DL (ref 70–99)
HCT VFR BLD AUTO: 43.7 %
HGB BLD-MCNC: 14.9 G/DL
IMM GRANULOCYTES # BLD AUTO: 0.02 X10(3) UL (ref 0–1)
IMM GRANULOCYTES NFR BLD: 0.3 %
LYMPHOCYTES # BLD AUTO: 1.54 X10(3) UL (ref 1–4)
LYMPHOCYTES NFR BLD AUTO: 24.6 %
MAGNESIUM SERPL-MCNC: 2.3 MG/DL (ref 1.6–2.6)
MCH RBC QN AUTO: 31.8 PG (ref 26–34)
MCHC RBC AUTO-ENTMCNC: 34.1 G/DL (ref 31–37)
MCV RBC AUTO: 93.2 FL
MONOCYTES # BLD AUTO: 0.87 X10(3) UL (ref 0.1–1)
MONOCYTES NFR BLD AUTO: 13.9 %
NEUTROPHILS # BLD AUTO: 3.45 X10 (3) UL (ref 1.5–7.7)
NEUTROPHILS # BLD AUTO: 3.45 X10(3) UL (ref 1.5–7.7)
NEUTROPHILS NFR BLD AUTO: 55 %
OSMOLALITY SERPL CALC.SUM OF ELEC: 289 MOSM/KG (ref 275–295)
PLATELET # BLD AUTO: 180 10(3)UL (ref 150–450)
POTASSIUM SERPL-SCNC: 3.8 MMOL/L (ref 3.5–5.1)
PROT SERPL-MCNC: 7.2 G/DL (ref 6.4–8.2)
RBC # BLD AUTO: 4.69 X10(6)UL
SODIUM SERPL-SCNC: 139 MMOL/L (ref 136–145)
TSI SER-ACNC: 1.67 MIU/ML (ref 0.36–3.74)
WBC # BLD AUTO: 6.3 X10(3) UL (ref 4–11)

## 2023-10-06 PROCEDURE — 85025 COMPLETE CBC W/AUTO DIFF WBC: CPT | Performed by: EMERGENCY MEDICINE

## 2023-10-06 PROCEDURE — 84443 ASSAY THYROID STIM HORMONE: CPT | Performed by: EMERGENCY MEDICINE

## 2023-10-06 PROCEDURE — 99284 EMERGENCY DEPT VISIT MOD MDM: CPT

## 2023-10-06 PROCEDURE — 36415 COLL VENOUS BLD VENIPUNCTURE: CPT

## 2023-10-06 PROCEDURE — 80053 COMPREHEN METABOLIC PANEL: CPT | Performed by: EMERGENCY MEDICINE

## 2023-10-06 PROCEDURE — 99283 EMERGENCY DEPT VISIT LOW MDM: CPT

## 2023-10-06 RX ORDER — GABAPENTIN 100 MG/1
100 CAPSULE ORAL 3 TIMES DAILY
Qty: 90 CAPSULE | Refills: 1 | Status: SHIPPED | OUTPATIENT
Start: 2023-10-06

## 2023-10-06 NOTE — TELEPHONE ENCOUNTER
Called patient regarding provider's recommendation & RX that was sent to [facility]. Patient to call our office in one week with update, or sooner if any issues arise.  MARION

## 2023-10-06 NOTE — TELEPHONE ENCOUNTER
Pt is calling in regards to ER visit this morning.  Pt states that the tingling in arms legs and feet, is becoming worse as the day goes on Pt would like advice on what he should do for this pain; Please advise

## 2023-10-06 NOTE — TELEPHONE ENCOUNTER
Called and spoke with patient. He was seen in the ED today for paresthesias (arrived [id number] discharged). States 6 weeks ago, started having numbness in bilateral toes/feet, one day after spreading mulch barefoot. Now, the past 2-3 days, is having \"needles & pin pricks\" all over his body (ie: chest,back,arms,elbows,fingers,legs). Reports \"it is not painful, just uncomfortable and it randomly comes & goes. Does not last long, only seconds. \"   States he went to the ER because it was different than just numbness in his feet. LOV:9/13/23  Plan:  EMG   Consider Podiatrist consult after emg  Discussed possible etiology, work up , care plan with pt      Routed to provider for review and recommendation.

## 2023-10-06 NOTE — ED INITIAL ASSESSMENT (HPI)
Pt arrives ambulatory to ED with c/o tingling \"all over his body\" that started a few days ago. Pt states that a couple months ago he had tingling in his feet and saw a neurologist and has a procedure scheduled for the end of the month.

## 2023-10-09 NOTE — TELEPHONE ENCOUNTER
Pt called stating he is concerned about taking the gabapentin. He is asthmatic, since last seeing Provider, he now feels like pins/needles in his whole body, he is having vision issues, his hand/finger joints lock/cramp up. He didn't feel like he could do the MRI when in ED, but he believes it is necessary to identify his exact problem. Please advise, Pt's best call back number is [phone]. Endorsed to RN for Provider.

## 2023-10-09 NOTE — TELEPHONE ENCOUNTER
Patient now requesting to have outpatient MRI of Brain done, initially refused in the ED on 10/6/23. Extremely claustrophobic but does not want valium (refer to ED note). Routed to provider for recommendation. Patient calling with concerns about taking the gabapentin. Has a history of asthma & is seeing an ophthalmologist for vision issues tomorrow 10/1023. Read on the internet possible side effects could be respiratory and visual. Will wait until after visit & consult tomorrow to start (or not) medication and will follow-up.

## 2023-10-11 ENCOUNTER — HOSPITAL ENCOUNTER (OUTPATIENT)
Dept: ULTRASOUND IMAGING | Facility: HOSPITAL | Age: 68
Discharge: HOME | End: 2023-10-11
Attending: INTERNAL MEDICINE
Payer: MEDICARE

## 2023-10-11 DIAGNOSIS — I73.9 PERIPHERAL VASCULAR DISEASE (HCC): ICD-10-CM

## 2023-10-11 DIAGNOSIS — G62.9 NEUROPATHY: ICD-10-CM

## 2023-10-11 PROCEDURE — 93925 LOWER EXTREMITY STUDY: CPT | Performed by: INTERNAL MEDICINE

## 2023-10-20 ENCOUNTER — TELEPHONE (OUTPATIENT)
Dept: NEUROLOGY | Facility: CLINIC | Age: 68
End: 2023-10-20

## 2023-10-20 NOTE — TELEPHONE ENCOUNTER
Pt states tingling has progressed and now is in his arms and tummy and sometimes his back. Pt has 2 limb EMG scheduled for legs next week asking if arms should be done as well. Psr advised may not be enough time but will send to nursing to advise.

## 2023-10-23 NOTE — TELEPHONE ENCOUNTER
Rescheduled Pt to first available, 1/16/2024 @ 1:00 p.m. Added as high priority on the wait list. Please assist watching for an earlier 40 minute appt. Endorsed to RN and .

## 2023-10-24 ENCOUNTER — ORDER TRANSCRIPTION (OUTPATIENT)
Dept: ADMINISTRATIVE | Facility: HOSPITAL | Age: 68
End: 2023-10-24

## 2023-10-24 DIAGNOSIS — G62.9 PERIPHERAL NERVE DISORDER: Primary | ICD-10-CM

## 2023-10-30 ENCOUNTER — TELEPHONE (OUTPATIENT)
Dept: NEUROLOGY | Facility: CLINIC | Age: 68
End: 2023-10-30

## 2023-10-30 NOTE — TELEPHONE ENCOUNTER
Pt is calling for an updated EMG order to reflect all four limbs. Pt wants to call Central scheduling to see if there is an earlier appt. Please advise, Pt's best call back number is [phone]. Endorsed to RN for Provider.

## 2023-11-01 ENCOUNTER — LAB ENCOUNTER (OUTPATIENT)
Dept: LAB | Facility: HOSPITAL | Age: 68
End: 2023-11-01
Attending: ANESTHESIOLOGY
Payer: MEDICARE

## 2023-11-01 DIAGNOSIS — R20.9 DISTURBANCE OF SKIN SENSATION: Primary | ICD-10-CM

## 2023-11-01 LAB
BILIRUB UR QL STRIP.AUTO: NEGATIVE
CLARITY UR REFRACT.AUTO: CLEAR
COLOR UR AUTO: YELLOW
EST. AVERAGE GLUCOSE BLD GHB EST-MCNC: 120 MG/DL (ref 68–126)
GLUCOSE UR STRIP.AUTO-MCNC: NORMAL MG/DL
HBA1C MFR BLD: 5.8 % (ref ?–5.7)
IGA SERPL-MCNC: 238 MG/DL (ref 70–312)
IGM SERPL-MCNC: 41.2 MG/DL (ref 43–279)
IMMUNOGLOBULIN PNL SER-MCNC: 1050 MG/DL (ref 791–1643)
KETONES UR STRIP.AUTO-MCNC: NEGATIVE MG/DL
LEUKOCYTE ESTERASE UR QL STRIP.AUTO: NEGATIVE
NITRITE UR QL STRIP.AUTO: NEGATIVE
PH UR STRIP.AUTO: 6.5 [PH] (ref 5–8)
RBC UR QL AUTO: NEGATIVE
RHEUMATOID FACT SERPL-ACNC: <10 IU/ML (ref ?–15)
SP GR UR STRIP.AUTO: 1.02 (ref 1–1.03)
UROBILINOGEN UR STRIP.AUTO-MCNC: NORMAL MG/DL

## 2023-11-01 PROCEDURE — 82784 ASSAY IGA/IGD/IGG/IGM EACH: CPT

## 2023-11-01 PROCEDURE — 86225 DNA ANTIBODY NATIVE: CPT

## 2023-11-01 PROCEDURE — 86038 ANTINUCLEAR ANTIBODIES: CPT

## 2023-11-01 PROCEDURE — 82525 ASSAY OF COPPER: CPT

## 2023-11-01 PROCEDURE — 36415 COLL VENOUS BLD VENIPUNCTURE: CPT

## 2023-11-01 PROCEDURE — 86431 RHEUMATOID FACTOR QUANT: CPT

## 2023-11-01 PROCEDURE — 83521 IG LIGHT CHAINS FREE EACH: CPT

## 2023-11-01 PROCEDURE — 83036 HEMOGLOBIN GLYCOSYLATED A1C: CPT

## 2023-11-01 PROCEDURE — 81003 URINALYSIS AUTO W/O SCOPE: CPT

## 2023-11-01 PROCEDURE — 86334 IMMUNOFIX E-PHORESIS SERUM: CPT

## 2023-11-01 PROCEDURE — 84165 PROTEIN E-PHORESIS SERUM: CPT

## 2023-11-01 PROCEDURE — 84207 ASSAY OF VITAMIN B-6: CPT

## 2023-11-02 LAB
ALBUMIN SERPL ELPH-MCNC: 3.67 G/DL (ref 3.75–5.21)
ALBUMIN/GLOB SERPL: 1.45 {RATIO} (ref 1–2)
ALPHA1 GLOB SERPL ELPH-MCNC: 0.22 G/DL (ref 0.19–0.46)
ALPHA2 GLOB SERPL ELPH-MCNC: 0.58 G/DL (ref 0.48–1.05)
B-GLOBULIN SERPL ELPH-MCNC: 0.75 G/DL (ref 0.68–1.23)
DSDNA IGG SERPL IA-ACNC: <0.6 IU/ML
ENA AB SER QL IA: 0.2 UG/L
ENA AB SER QL IA: NEGATIVE
GAMMA GLOB SERPL ELPH-MCNC: 0.99 G/DL (ref 0.62–1.7)
KAPPA LC FREE SER-MCNC: 2.34 MG/DL (ref 0.33–1.94)
KAPPA LC FREE/LAMBDA FREE SER NEPH: 1.6 {RATIO} (ref 0.26–1.65)
LAMBDA LC FREE SERPL-MCNC: 1.47 MG/DL (ref 0.57–2.63)
PROT SERPL-MCNC: 6.2 G/DL (ref 5.7–8.2)

## 2023-11-04 ENCOUNTER — HOSPITAL ENCOUNTER (EMERGENCY)
Facility: HOSPITAL | Age: 68
Discharge: HOME | End: 2023-11-04
Attending: EMERGENCY MEDICINE
Payer: MEDICARE

## 2023-11-04 VITALS
HEART RATE: 67 BPM | TEMPERATURE: 97 F | DIASTOLIC BLOOD PRESSURE: 69 MMHG | WEIGHT: 142 LBS | RESPIRATION RATE: 16 BRPM | BODY MASS INDEX: 22 KG/M2 | OXYGEN SATURATION: 98 % | SYSTOLIC BLOOD PRESSURE: 134 MMHG

## 2023-11-04 DIAGNOSIS — M54.50 ACUTE LOW BACK PAIN WITHOUT SCIATICA, UNSPECIFIED BACK PAIN LATERALITY: Primary | ICD-10-CM

## 2023-11-04 PROCEDURE — 99283 EMERGENCY DEPT VISIT LOW MDM: CPT

## 2023-11-04 PROCEDURE — 99284 EMERGENCY DEPT VISIT MOD MDM: CPT

## 2023-11-04 RX ORDER — HYDROCODONE BITARTRATE AND ACETAMINOPHEN 5; 325 MG/1; MG/1
1-2 TABLET ORAL EVERY 6 HOURS PRN
Qty: 20 TABLET | Refills: 0 | Status: SHIPPED | OUTPATIENT
Start: 2023-11-04 | End: 2023-11-09

## 2023-11-04 NOTE — ED INITIAL ASSESSMENT (HPI)
C/o lower back pain, with \"pins and needle\" sensation to both legs. Ambulatory without apparent difficulty.

## 2023-11-04 NOTE — DISCHARGE INSTRUCTIONS
You can take Norco for your pain.   Do not drink or drive on Norco as it may make you sleepy    You have an MRI Lumbar Spine without Contrast scheduled for:    Monday, 11/6/23  6AM  At [address] insurance card and photo ID with you  Park in the [place] parking garage  Check in at [facility]

## 2023-11-04 NOTE — CM/SW NOTE
[facility] assistance requested to schedule MRI. MRI Lumbar Spine scheduled for:    Monday, 11/6/23 @ 6AM at [facility]    Patient needs to bring insurance card and photo ID. Patient to use [employer]. Patient to check in at Outpatient Registration    Patient provided with all information. MD and RN updated.

## 2023-11-06 ENCOUNTER — HOSPITAL ENCOUNTER (OUTPATIENT)
Dept: MRI IMAGING | Facility: HOSPITAL | Age: 68
Discharge: HOME | End: 2023-11-06
Attending: EMERGENCY MEDICINE
Payer: MEDICARE

## 2023-11-06 DIAGNOSIS — M54.50 ACUTE LOW BACK PAIN WITHOUT SCIATICA, UNSPECIFIED BACK PAIN LATERALITY: ICD-10-CM

## 2023-11-06 LAB — VITAMIN B6: 8.6 UG/L

## 2023-11-06 PROCEDURE — 72148 MRI LUMBAR SPINE W/O DYE: CPT | Performed by: EMERGENCY MEDICINE

## 2023-11-11 LAB — COPPER: 97 UG/DL

## 2023-11-30 ENCOUNTER — PROCEDURE VISIT (OUTPATIENT)
Dept: NEUROLOGY | Facility: CLINIC | Age: 68
End: 2023-11-30
Payer: MEDICARE

## 2023-11-30 ENCOUNTER — TELEPHONE (OUTPATIENT)
Dept: NEUROLOGY | Facility: CLINIC | Age: 68
End: 2023-11-30

## 2023-11-30 DIAGNOSIS — R20.2 PARESTHESIAS: Primary | ICD-10-CM

## 2023-11-30 PROCEDURE — 95913 NRV CNDJ TEST 13/> STUDIES: CPT | Performed by: OTHER

## 2023-11-30 PROCEDURE — 95886 MUSC TEST DONE W/N TEST COMP: CPT | Performed by: OTHER

## 2023-11-30 NOTE — TELEPHONE ENCOUNTER
Patient presented to office for EMG only with Dr. Shasha Jenkins. PT existing patient of Dr. Lenny Ham. PT had MRI disk ordered by [facility] that PSR uploaded via [employer]. The following studies uploaded:    MRI Cervical Spine  MRI Brain    Disk returned to patient. Advised to reach out to Dr. America Haynes office/ Ordering provider for interpretation.

## 2023-12-05 ENCOUNTER — TELEPHONE (OUTPATIENT)
Dept: NEUROLOGY | Facility: CLINIC | Age: 68
End: 2023-12-05

## 2023-12-05 ENCOUNTER — OFFICE VISIT (OUTPATIENT)
Dept: NEUROLOGY | Facility: CLINIC | Age: 68
End: 2023-12-05
Payer: MEDICARE

## 2023-12-05 VITALS
OXYGEN SATURATION: 97 % | RESPIRATION RATE: 16 BRPM | DIASTOLIC BLOOD PRESSURE: 62 MMHG | HEART RATE: 68 BPM | BODY MASS INDEX: 22 KG/M2 | WEIGHT: 142 LBS | SYSTOLIC BLOOD PRESSURE: 118 MMHG

## 2023-12-05 DIAGNOSIS — R20.0 NUMBNESS AND TINGLING OF BOTH FEET: Primary | ICD-10-CM

## 2023-12-05 DIAGNOSIS — R20.2 NUMBNESS AND TINGLING OF BOTH FEET: Primary | ICD-10-CM

## 2023-12-05 PROCEDURE — 99215 OFFICE O/P EST HI 40 MIN: CPT | Performed by: OTHER

## 2023-12-05 RX ORDER — ALBUTEROL SULFATE 90 UG/1
2 AEROSOL, METERED RESPIRATORY (INHALATION)
COMMUNITY
Start: 2023-07-12

## 2023-12-05 NOTE — PROGRESS NOTES
HPI:     Zbigniew Harry is a 70year old male with a PMH of OA. New to me, saw Dr Bhavani Fulton in the past.    Following for:  1. BPH/LUTS  2. OAB/urgency    PCP - Serafin Luther  Prior Urologist - [address] 12/27/22    Presents to establish care with me. He wants to discuss prior UA showing trace protein. He feels well. Appetite and energy are good. AUA SS is 5/35 with 2 n, f; 1 u. Happy with LUTS. Incontinence: none  Penoscrotal: b/l testicular TTP which prohibits me from doing a full exam but no obvious masses. RADHA: ~ 20-30 g prostate, no nodules or tenderness    UA is negative and PVR was zero. UTI hx: none  Gross hematuria: none  Tobacco hx: none  Kidney stone hx: none  Fam h/o  malignancy: none    Poor potency and prefers observation. PSA 1.96 10/2/23    Drinks ~ 60 oz water, 20 tea with variable urine. I encouraged the pt drink at least 40-60 oz water per day or enough to keep urine clear to pale yellow and avoid dietary irritants to help with OAB. Discussed that trace protein is a variant of normal and should not require any further w/u. He prefers observation for BPH/LUTS, ED, urinary frequency. He will continue annual PSA check and wants to f/u annually with me.     HISTORY:  Past Medical History:   Diagnosis Date    Arthritis     Asthma     Back pain     Back problem     Difficult intubation     pt unaware of any previous difficulties with intubation    Extrinsic asthma, unspecified     Osteoarthritis     PONV (postoperative nausea and vomiting)     Shortness of breath     Thyroid disease     Visual impairment       Past Surgical History:   Procedure Laterality Date    COLONOSCOPY  1/2015    COLONOSCOPY N/A 1/20/2015    Procedure: COLONOSCOPY;  Surgeon: Louisa Pappas MD;  Location: [address] [place] [address] ENDOSCOPY    HERNIA SURGERY      umbilical hernia    OTHER      L KNEE MENICUS REPAIR, R AND L SHOULDER ROTATOR CUFF  REPAIR    OTHER SURGICAL HISTORY  80's    L varicocelectomy    OTHER SURGICAL HISTORY  90's    R hydrocelectomy    OTHER SURGICAL HISTORY      L&R Shoulder      Family History   Problem Relation Age of Onset    Stroke Mother     Diabetes Brother       Social History:   Social History     Socioeconomic History    Marital status: Single   Tobacco Use    Smoking status: Never     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)    Smokeless tobacco: Never   Vaping Use    Vaping Use: Never used   Substance and Sexual Activity    Alcohol use: No    Drug use: No   Other Topics Concern    Caffeine Concern Yes     Comment: green tea    Exercise Yes     Comment: walking        Medications (Active prior to today's visit):  Current Outpatient Medications   Medication Sig Dispense Refill    Moringa 500 MG Oral Cap Take by mouth 2 (two) times a day. NON FORMULARY Organic raw sauerkraut      NON FORMULARY Kombuchie tea      Coenzyme Q10 10 MG Oral Cap Take 10 mg by mouth daily. Vitamin B-12 1000 MCG Oral Tab Take 1 tablet (1,000 mcg total) by mouth daily. Cholecalciferol (VITAMIN D) 2000 units Oral Cap Take 1 capsule (2,000 Units total) by mouth daily. omega-3 fatty acids (FISH OIL) 1000 MG Oral Cap Take 1,000 mg by mouth daily. Cristin, Zingiber officinalis, 250 MG Oral Cap Take 1 tablet by mouth daily. Cinnamon 500 MG Oral Tab Take 500 mg by mouth daily. Turmeric 450 MG Oral Cap Take 1 tablet by mouth daily. albuterol 108 (90 Base) MCG/ACT Inhalation Aero Soln Inhale 2 puffs into the lungs. (Patient not taking: Reported on 12/13/2023)         Allergies: Allergies   Allergen Reactions    Cephalexin HIVES         ROS:     A comprehensive 10 point review of systems was completed. Pertinent positives and negatives noted in the the HPI.     PHYSICAL EXAM:     GENERAL APPEARANCE: well, developed, well nourished, in no acute distress  NEUROLOGIC: nonfocal, alert and oriented  HEAD: normocephalic, atraumatic  EYES: sclera non-icteric  EARS: hearing intact  ORAL CAVITY: mucosa moist  NECK/THYROID: no obvious goiter or masses  LUNGS: nonlabored breathing  ABDOMEN: soft, no obvious masses or tenderness  SKIN: no obvious rashes    : as noted above    ASSESSMENT/PLAN:   Diagnoses and all orders for this visit:    BPH with obstruction/lower urinary tract symptoms  -     URINALYSIS, AUTO, W/O SCOPE    Urinary urgency    Erectile dysfunction, unspecified erectile dysfunction type      - as noted above. Thanks again for this consult.     Harry Henry MD, [facility]  Urologist  [place] [address]  Office: [phone]

## 2023-12-13 ENCOUNTER — OFFICE VISIT (OUTPATIENT)
Dept: SURGERY | Facility: CLINIC | Age: 68
End: 2023-12-13

## 2023-12-13 DIAGNOSIS — R39.15 URINARY URGENCY: ICD-10-CM

## 2023-12-13 DIAGNOSIS — N40.1 BPH WITH OBSTRUCTION/LOWER URINARY TRACT SYMPTOMS: Primary | ICD-10-CM

## 2023-12-13 DIAGNOSIS — N13.8 BPH WITH OBSTRUCTION/LOWER URINARY TRACT SYMPTOMS: Primary | ICD-10-CM

## 2023-12-13 DIAGNOSIS — N52.9 ERECTILE DYSFUNCTION, UNSPECIFIED ERECTILE DYSFUNCTION TYPE: ICD-10-CM

## 2023-12-13 LAB
APPEARANCE: CLEAR
BILIRUBIN: NEGATIVE
GLUCOSE (URINE DIPSTICK): NEGATIVE MG/DL
KETONES (URINE DIPSTICK): NEGATIVE MG/DL
LEUKOCYTES: NEGATIVE
MULTISTIX LOT#: NORMAL NUMERIC
NITRITE, URINE: NEGATIVE
OCCULT BLOOD: NEGATIVE
PH, URINE: 5.5 (ref 4.5–8)
PROTEIN (URINE DIPSTICK): NEGATIVE MG/DL
SPECIFIC GRAVITY: 1.02 (ref 1–1.03)
URINE-COLOR: YELLOW
UROBILINOGEN,SEMI-QN: 0.2 MG/DL (ref 0–1.9)

## 2023-12-13 PROCEDURE — 99214 OFFICE O/P EST MOD 30 MIN: CPT | Performed by: UROLOGY

## 2023-12-13 PROCEDURE — 81003 URINALYSIS AUTO W/O SCOPE: CPT | Performed by: UROLOGY

## 2024-02-25 ENCOUNTER — HOSPITAL ENCOUNTER (EMERGENCY)
Facility: HOSPITAL | Age: 69
Discharge: HOME | End: 2024-02-25
Attending: EMERGENCY MEDICINE
Payer: MEDICARE

## 2024-02-25 VITALS
OXYGEN SATURATION: 99 % | RESPIRATION RATE: 19 BRPM | WEIGHT: 138 LBS | TEMPERATURE: 99 F | HEART RATE: 75 BPM | DIASTOLIC BLOOD PRESSURE: 74 MMHG | HEIGHT: 68 IN | SYSTOLIC BLOOD PRESSURE: 124 MMHG | BODY MASS INDEX: 20.92 KG/M2

## 2024-02-25 DIAGNOSIS — K13.70 ORAL LESION: Primary | ICD-10-CM

## 2024-02-25 PROCEDURE — 99283 EMERGENCY DEPT VISIT LOW MDM: CPT

## 2024-02-25 RX ORDER — AMOXICILLIN AND CLAVULANATE POTASSIUM 875; 125 MG/1; MG/1
875 TABLET, FILM COATED ORAL ONCE
Status: COMPLETED | OUTPATIENT
Start: 2024-02-25 | End: 2024-02-25

## 2024-02-25 RX ORDER — AMOXICILLIN AND CLAVULANATE POTASSIUM 875; 125 MG/1; MG/1
1 TABLET, FILM COATED ORAL 2 TIMES DAILY
Qty: 14 TABLET | Refills: 0 | Status: SHIPPED | OUTPATIENT
Start: 2024-02-25 | End: 2024-03-03

## 2024-02-25 NOTE — ED PROVIDER NOTES
Patient Seen in: [facility] Emergency Department      History     Chief Complaint   Patient presents with    Dental Problem     Stated Complaint: concern for infection to gum over area of bone graft    Subjective:   HPI    68-year-old male presents today for evaluation.  On February 13, patient had COVID-19.  He had fevers and a cough.  He monitored his symptoms at home and ultimately recovered.  He feels significantly better.  On the 21st, he noticed swelling over his left upper neck.  He was seen by his primary care doctor and it was thought that may be he had a blocked gland.  He treated his swelling with warm compresses and that has since significantly improved.  This morning with brushing his teeth, he noticed a bump at the site of her previous tooth extraction.  He thought that maybe there is some pus drainage as well.  He denies any significant pain or fevers.  He now presents for evaluation.    Objective:   Past Medical History:   Diagnosis Date    Arthritis     Asthma (HCC)     Back pain     Back problem     Difficult intubation     pt unaware of any previous difficulties with intubation    Extrinsic asthma, unspecified     Osteoarthritis     PONV (postoperative nausea and vomiting)     Shortness of breath     Thyroid disease     Visual impairment               Past Surgical History:   Procedure Laterality Date    COLONOSCOPY  1/2015    COLONOSCOPY N/A 1/20/2015    Procedure: COLONOSCOPY;  Surgeon: Shayne Kirk MD;  Location:  ENDOSCOPY    HERNIA SURGERY      umbilical hernia    OTHER      L KNEE MENICUS REPAIR, R AND L SHOULDER ROTATOR CUFF  REPAIR    OTHER SURGICAL HISTORY  80's    L varicocelectomy    OTHER SURGICAL HISTORY  90's    R hydrocelectomy    OTHER SURGICAL HISTORY      L&R Shoulder                Social History     Socioeconomic History    Marital status: Single   Tobacco Use    Smoking status: Never     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)    Smokeless  tobacco: Never   Vaping Use    Vaping Use: Never used   Substance and Sexual Activity    Alcohol use: No    Drug use: No   Other Topics Concern    Caffeine Concern Yes     Comment: green tea    Exercise Yes     Comment: walking              Review of Systems    Positive for stated complaint: concern for infection to gum over area of bone graft  Other systems are as noted in HPI.  Constitutional and vital signs reviewed.      All other systems reviewed and negative except as noted above.    Physical Exam     ED Triage Vitals   BP 02/25/24 0940 124/74   Pulse 02/25/24 0940 81   Resp 02/25/24 0940 19   Temp 02/25/24 1018 98.8 °F (37.1 °C)   Temp src 02/25/24 1018 Temporal   SpO2 02/25/24 0940 98 %   O2 Device 02/25/24 0940 None (Room air)       Current:/74   Pulse 81   Temp 98.8 °F (37.1 °C) (Temporal)   Resp 19   Ht 172.7 cm (5' 8\")   Wt 62.6 kg   SpO2 98%   BMI 20.98 kg/m²         Physical Exam  Vitals and nursing note reviewed.   Constitutional:       Appearance: Normal appearance.   HENT:      Head: Normocephalic.      Nose: Nose normal.      Mouth/Throat:      Mouth: Mucous membranes are moist.      Comments: Around the area of where tooth 19 was, there is a roughly 1 to 2 mm diameter white lesion.  I am unable to express any drainage.  There is no surrounding gingival erythema.  He has no pain with palpation of the gumline or mandible  Eyes:      Extraocular Movements: Extraocular movements intact.   Cardiovascular:      Rate and Rhythm: Normal rate.   Pulmonary:      Effort: Pulmonary effort is normal.   Abdominal:      General: Abdomen is flat.   Musculoskeletal:         General: Normal range of motion.   Lymphadenopathy:      Cervical: No cervical adenopathy.   Skin:     General: Skin is warm.   Neurological:      General: No focal deficit present.      Mental Status: He is alert.   Psychiatric:         Mood and Affect: Mood normal.           ED Course   Labs Reviewed - No data to display                    MDM      Differential Diagnosis  Nontoxic 68-year-old male presents today after he thought he noticed some drainage from a previous tooth extraction site.  He is afebrile, hemodynamically stable and overall very well-appearing.  There is no significant gingival erythema.  He has no tenderness with palpation of the gingiva or the mandible.  There is a small white area in the extraction site that he noticed some purulent drainage earlier.  I am unable to express any drainage at this time.  Patient may have a slight infection in the site at this time, although there may be migration of the graft itself causing the visualized change.  Ultimately, with patient's good clinical appearance, I feel he is stable for discharge home for outpatient evaluation by an oral surgeon.  I strongly advised close follow-up with the next several days.  Will prescribe antibiotics in the instance that this is an early infection.  In regards to antibiotics, I discussed options.  Patient would like to avoid clindamycin because it previously caused gastrointestinal side effects.  He has a previous allergic reaction to cephalexin with urticaria.  He would prefer to try Augmentin.  Will monitor for any allergic reaction.    10:54 am  Patient is tolerating Augmentin without issue.  Will DC with a course of antibiotics.  I again emphasized the importance of oral surgery follow-up to assess for any potential migration of the graft or early infection.  He will follow-up in the next week as instructed and return for any worsening symptoms.                                   Medical Decision Making      Disposition and Plan     Clinical Impression:  1. Oral lesion         Disposition:  Discharge  2/25/2024 10:55 am    Follow-up:  Rudolph Bean DDS  [address] [address] [phone]    Follow up in 1 day(s)      Kathi Deleon, MARITZA  [address] [facility] [address] [phone]    Call in 1  day(s)      Sixto Bynum, DIOMEDES  [address] [facility] [address]  [phone]    Call in 1 day(s)            Medications Prescribed:  Current Discharge Medication List        START taking these medications    Details   amoxicillin clavulanate 875-125 MG Oral Tab Take 1 tablet by mouth 2 (two) times daily for 7 days.  Qty: 14 tablet, Refills: 0

## 2024-02-25 NOTE — ED INITIAL ASSESSMENT (HPI)
Pt ambulatory to ED with c/o bone graft infection from tooth extraction a year ago to tooth #19. Has swollen salivary glands. Denies fevers since pt noticed infection. Covid + 2/13. Denies pain. Pt reports pus from extraction site. PT a/ox4

## 2024-02-25 NOTE — DISCHARGE INSTRUCTIONS
Follow-up with one of the surgeons provided to evaluate the bone graft to assess for any potential infection or migration.  Take the antibiotic as prescribed.  Return for any fevers, increasing pain, or any other concerning symptoms.

## 2024-03-27 ENCOUNTER — HOSPITAL ENCOUNTER (EMERGENCY)
Facility: HOSPITAL | Age: 69
Discharge: HOME | End: 2024-03-27
Attending: EMERGENCY MEDICINE
Payer: MEDICARE

## 2024-03-27 ENCOUNTER — TELEPHONE (OUTPATIENT)
Dept: NEUROLOGY | Facility: CLINIC | Age: 69
End: 2024-03-27

## 2024-03-27 ENCOUNTER — APPOINTMENT (OUTPATIENT)
Dept: CT IMAGING | Facility: HOSPITAL | Age: 69
End: 2024-03-27
Attending: EMERGENCY MEDICINE
Payer: MEDICARE

## 2024-03-27 VITALS
TEMPERATURE: 98 F | HEART RATE: 69 BPM | SYSTOLIC BLOOD PRESSURE: 129 MMHG | HEIGHT: 68 IN | DIASTOLIC BLOOD PRESSURE: 76 MMHG | OXYGEN SATURATION: 98 % | RESPIRATION RATE: 16 BRPM | BODY MASS INDEX: 21.22 KG/M2 | WEIGHT: 140 LBS

## 2024-03-27 DIAGNOSIS — R51.9 NONINTRACTABLE HEADACHE, UNSPECIFIED CHRONICITY PATTERN, UNSPECIFIED HEADACHE TYPE: ICD-10-CM

## 2024-03-27 DIAGNOSIS — S09.90XA INJURY OF HEAD, INITIAL ENCOUNTER: Primary | ICD-10-CM

## 2024-03-27 PROCEDURE — 99284 EMERGENCY DEPT VISIT MOD MDM: CPT

## 2024-03-27 PROCEDURE — 70450 CT HEAD/BRAIN W/O DYE: CPT | Performed by: EMERGENCY MEDICINE

## 2024-03-27 NOTE — TELEPHONE ENCOUNTER
[facility]    Office hours and contact info provided.     [employer] message also sent

## 2024-03-27 NOTE — ED PROVIDER NOTES
Patient Seen in: [facility] Emergency Department      History     Chief Complaint   Patient presents with    Head Neck Injury     Stated Complaint: hit head on kitchen cabinet 5 days ago, been having headaches. no loc. no blood*    Subjective:   HPI    68-year-old male comes to the hospital complaint of having difficulty with intermittent headache since Monday.  He states on Sunday he hit his head on a cabinet door.  Did not lose conscious.  He has no neck pain.  Denies any nausea or vomiting.  No numbness or weakness.  He denies any dizziness.  He says he spoke with his neurologist told to come to the emergency room to get a CT to rule out injury.    Objective:   Past Medical History:   Diagnosis Date    Arthritis     Asthma (HCC)     Back pain     Back problem     Difficult intubation     pt unaware of any previous difficulties with intubation    Extrinsic asthma, unspecified     Osteoarthritis     PONV (postoperative nausea and vomiting)     Shortness of breath     Thyroid disease     Visual impairment               Past Surgical History:   Procedure Laterality Date    COLONOSCOPY  1/2015    COLONOSCOPY N/A 1/20/2015    Procedure: COLONOSCOPY;  Surgeon: Shayne Kirk MD;  Location:  ENDOSCOPY    HERNIA SURGERY      umbilical hernia    OTHER      L KNEE MENICUS REPAIR, R AND L SHOULDER ROTATOR CUFF  REPAIR    OTHER SURGICAL HISTORY  80's    L varicocelectomy    OTHER SURGICAL HISTORY  90's    R hydrocelectomy    OTHER SURGICAL HISTORY      L&R Shoulder                Social History     Socioeconomic History    Marital status: Single   Tobacco Use    Smoking status: Never     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)    Smokeless tobacco: Never   Vaping Use    Vaping Use: Never used   Substance and Sexual Activity    Alcohol use: No    Drug use: No   Other Topics Concern    Caffeine Concern Yes     Comment: green tea    Exercise Yes     Comment: walking              Review of  Systems    Positive for stated complaint: hit head on kitchen cabinet 5 days ago, been having headaches. no loc. no blood*  Other systems are as noted in HPI.  Constitutional and vital signs reviewed.      All other systems reviewed and negative except as noted above.    Physical Exam     ED Triage Vitals [03/27/24 1648]   /69   Pulse 76   Resp 16   Temp 98 °F (36.7 °C)   Temp src    SpO2 98 %   O2 Device None (Room air)       Current:/76   Pulse 69   Temp 98 °F (36.7 °C)   Resp 16   Ht 172.7 cm (5' 8\")   Wt 63.5 kg   SpO2 98%   BMI 21.29 kg/m²         Physical Exam    HEENT : NCAT, EOMI, PEERL,  neck supple, no JVD, trachea midline, No LAD  Heart: S1S2 normal. No murmurs, regular rate and rhythm  Lungs: Clear to auscultation bilaterally  Abdomen: Soft nontender nondistended normal active bowel sounds without rebound, guarding or masses noted  Back nontender without CVA tenderness  Extremity no clubbing, cyanosis or edema noted.  Full range of motion noted without tenderness  Neuro: No focal deficits noted    All measures to prevent infection transmission during my interaction with the patient were taken.  The patient was already wearing droplet mask on my arrival to the room.  Personal protective equipment including a droplet mask as well as gloves were worn throughout the duration of my exam.  Hand washing was performed prior to and after the exam.  Stethoscope and equipment used during my examination was cleaned with a super Sani cloth germicidal wipe following the exam.    ED Course   Labs Reviewed - No data to display       ED Course as of 03/27/24 1929  ------------------------------------------------------------  Time: 03/27 1927  Comment:  while here the patient had a CT of the brain that I interpreted showed no acute injury.  Reviewed the radiology port as well.     CT BRAIN OR HEAD (62025)    Result Date: 3/27/2024  PROCEDURE:  CT BRAIN OR HEAD (95632)  COMPARISON:  External Exams, MR,  MRI BRAIN (CPT=70551), 11/26/2023, 12:32 PM.  MR, MRI BRAIN (CPT=70551), 3/02/2021, 8:45 PM.  [facility] , CT, CT STROKE CTA BRAIN/CTA NECK (W IV)(CPT=70496/32982), 3/02/2021, 11:47 AM.  INDICATIONS:  hit head on kitchen cabinet 5 days ago, been having headaches. no loc. no blood thinners, ambulatory  TECHNIQUE:  Noncontrast CT scanning is performed through the brain. Dose reduction techniques were used. Dose information is transmitted to the ACR (American College of Radiology) NRDR (National Radiology Data Registry) which includes the Dose Index Registry.  PATIENT STATED HISTORY: (As transcribed by Technologist)  c/o head injury after bumping his head on the kitchen cabinet approx 5 days ago. States Monday he started to get bad headaches.    FINDINGS:  VENTRICLES/SULCI:  Study is mild stable atrophy without overt hydrocephalus.  Delete INTRACRANIAL:  Normal gray-white matter differentiation.  There is no evidence of acute infarct, or hemorrhage.  No mass.  No midline shift.  No extra-axial fluid collections noted. SINUSES:           No sign of acute sinusitis.  MASTOIDS:          No sign of acute inflammation. SKULL:             No evidence for fracture or osseous abnormality. OTHER:             None.            CONCLUSION:  Stable mild atrophy.  No hemorrhage or infarct or fracture.    LOCATION:  [facility]   Dictated by (CST): Scooter Springer MD on 3/27/2024 at 7:01 PM     Finalized by (CST): Scooter Springer MD on 3/27/2024 at 7:05 PM               MDM      Differential diagnosis included subdural hemorrhage, subarachnoid and other intracranial pathology but not limited such.  Patient CT is normal at this time the patient be discharged home with instructions and follow-up.  Patient is not having headache at this time.      Patient was screened and evaluated during this visit.   As a treating physician attending to the patient, I determined, within reasonable clinical confidence and prior to discharge, that an  emergency medical condition was not or was no longer present.  There was no indication for further evaluation, treatment or admission on an emergency basis.       The usual and customary discharge instuctions were discussed given the patient's ER course.  We discussed signs and symptoms that should prompt the patient's immediate return to the emergency department.   Reasonable over the counter and prescription treatment options and Physician follow up plan was discussed.       The patient is discharged in good condition.       This note was prepared using Dragon Medical voice recognition dictation software.  As a result errors may occur.  When identified to these areas have been corrected.  While every attempt is made to correct errors during dictation discrepancies may still exist.  Please contact if there are any errors.                                   Medical Decision Making      Disposition and Plan     Clinical Impression:  1. Injury of head, initial encounter    2. Nonintractable headache, unspecified chronicity pattern, unspecified headache type         Disposition:  Discharge  3/27/2024  7:29 pm    Follow-up:  Rudolph Adamson MD  [address] [address]  [phone]    Schedule an appointment as soon as possible for a visit in 3 day(s)            Medications Prescribed:  There are no discharge medications for this patient.

## 2024-03-27 NOTE — TELEPHONE ENCOUNTER
Pt states that he recently hit his head, and believes something serious is going on. Attempted to schedule appt for patient, it would be a month out, and the patient does not want to wait that long. Pt is requesting a cb. Please advise

## 2024-03-27 NOTE — TELEPHONE ENCOUNTER
S: Head Injury    B: LOV 12/5/2023    Offered neurontin if interested  Podiatrist referral given  Reviewed MRI, c spine, brain and EMG, may repeat brain MRI in 1 year  See orders and medications filed with this encounter. The patient indicates understanding of these issues and agrees with the plan.  Discussed with patient regarding assessment, work up, care plan and possible adverse and side effects of the medications.  Pt should go ER for any new or worsening symptoms and contact office   A total of 40 minutes were spent on patient care,  more than 50% was spent counseling patient regarding studies' results, assessment, treatment options and care plan.  RTC 6-9 months     A: Patient states he hit the left side of his head about 2\" above his ear on cabinet door corner about 4-5 days ago. States he experienced severe head pain Monday night 2 times and took ASA 162mg.- the first time was brief but the second time lasted for a few hours. Pierce states he experienced  the head pain again last night. States during day today he has had a mild headache. Denies visual disturbance or gait issues.  Advised patient be evaluated in ER now.    R: Message routed to provider as an FYI.

## 2024-03-27 NOTE — ED INITIAL ASSESSMENT (HPI)
Patient to the ED via walk-in with c/o head injury after bumping his head on the kitchen cabinet approx 5 days ago. States Monday he started to get bad headaches. Patient states he took some ASA and the pain went away. Patient called his Neuro MD and was told to go to the ED to r/o brain bleed.

## 2024-03-30 ENCOUNTER — HOSPITAL ENCOUNTER (EMERGENCY)
Facility: HOSPITAL | Age: 69
Discharge: HOME | End: 2024-03-30
Attending: EMERGENCY MEDICINE
Payer: MEDICARE

## 2024-03-30 VITALS
OXYGEN SATURATION: 99 % | SYSTOLIC BLOOD PRESSURE: 116 MMHG | HEIGHT: 68 IN | HEART RATE: 62 BPM | WEIGHT: 140 LBS | BODY MASS INDEX: 21.22 KG/M2 | TEMPERATURE: 99 F | DIASTOLIC BLOOD PRESSURE: 70 MMHG | RESPIRATION RATE: 17 BRPM

## 2024-03-30 DIAGNOSIS — B02.8 HERPES ZOSTER WITH COMPLICATION: Primary | ICD-10-CM

## 2024-03-30 PROCEDURE — 99284 EMERGENCY DEPT VISIT MOD MDM: CPT

## 2024-03-30 PROCEDURE — 99283 EMERGENCY DEPT VISIT LOW MDM: CPT

## 2024-03-30 RX ORDER — MOXIFLOXACIN 5 MG/ML
1 SOLUTION/ DROPS OPHTHALMIC 3 TIMES DAILY
Qty: 1 EACH | Refills: 0 | Status: SHIPPED | OUTPATIENT
Start: 2024-03-30 | End: 2024-04-06

## 2024-03-30 RX ORDER — ERYTHROMYCIN 5 MG/G
1 OINTMENT OPHTHALMIC EVERY 6 HOURS
Qty: 1 G | Refills: 0 | Status: SHIPPED | OUTPATIENT
Start: 2024-03-30 | End: 2024-04-06

## 2024-03-30 RX ORDER — VALACYCLOVIR HYDROCHLORIDE 1 G/1
1000 TABLET, FILM COATED ORAL 3 TIMES DAILY
Qty: 21 TABLET | Refills: 0 | Status: SHIPPED | OUTPATIENT
Start: 2024-03-30 | End: 2024-04-06

## 2024-03-30 NOTE — ED PROVIDER NOTES
Patient Seen in: [facility] Emergency Department      History     Chief Complaint   Patient presents with    Rash Skin Problem    Eye Visual Problem     Stated Complaint: hit in the head a few days ago, had CT done. now having eye pain and rash to fo*    Subjective:   HPI    This is a 68-year-old male who states he hit his head few days ago..  The patient states he hit his head a few days ago.  And then he stated that had a CT of his head that was negative.  That was on 327.  He said that he did not really think about he just had a little bit of pain over the eye and then all of a sudden he has noticed a rash on his left side of his forehead.  He is not on blood thinners she has not had any nausea vomiting says the counter.  Goes to his eye.  He has no fevers or chills..  The rash is just going to the left part of the forehead.    CT was done on the 27th and did not show any bleed..      CT BRAIN OR HEAD (42471)    Result Date: 3/27/2024  PROCEDURE:  CT BRAIN OR HEAD ([id number])  COMPARISON:  External Exams, MR, MRI BRAIN (CPT=70551), 11/26/2023, 12:32 PM.  MR, MRI BRAIN (CPT=70551), 3/02/2021, 8:45 PM.  [place] , CT, CT STROKE CTA BRAIN/CTA NECK (W IV)(CPT=70496/35485), 3/02/2021, 11:47 AM.  INDICATIONS:  hit head on kitchen cabinet 5 days ago, been having headaches. no loc. no blood thinners, ambulatory  TECHNIQUE:  Noncontrast CT scanning is performed through the brain. Dose reduction techniques were used. Dose information is transmitted to the ACR (American College of Radiology) NRDR (National Radiology Data Registry) which includes the Dose Index Registry.  PATIENT STATED HISTORY: (As transcribed by Technologist)  c/o head injury after bumping his head on the kitchen cabinet approx 5 days ago. States Monday he started to get bad headaches.    FINDINGS:  VENTRICLES/SULCI:  Study is mild stable atrophy without overt hydrocephalus.  Delete INTRACRANIAL:  Normal gray-white matter differentiation.  There is no  evidence of acute infarct, or hemorrhage.  No mass.  No midline shift.  No extra-axial fluid collections noted. SINUSES:           No sign of acute sinusitis.  MASTOIDS:          No sign of acute inflammation. SKULL:             No evidence for fracture or osseous abnormality. OTHER:             None.            CONCLUSION:  Stable mild atrophy.  No hemorrhage or infarct or fracture.    LOCATION:  [facility]   Dictated by (CST): Scooter Springer MD on 3/27/2024 at 7:01 PM     Finalized by (CST): Scooter Springer MD on 3/27/2024 at 7:05 PM        Objective:   Past Medical History:   Diagnosis Date    Arthritis     Asthma (HCC)     Back pain     Back problem     Difficult intubation     pt unaware of any previous difficulties with intubation    Extrinsic asthma, unspecified     Osteoarthritis     PONV (postoperative nausea and vomiting)     Shortness of breath     Thyroid disease     Visual impairment               Past Surgical History:   Procedure Laterality Date    COLONOSCOPY  1/2015    COLONOSCOPY N/A 1/20/2015    Procedure: COLONOSCOPY;  Surgeon: Shayne Kirk MD;  Location:  ENDOSCOPY    HERNIA SURGERY      umbilical hernia    OTHER      L KNEE MENICUS REPAIR, R AND L SHOULDER ROTATOR CUFF  REPAIR    OTHER SURGICAL HISTORY  80's    L varicocelectomy    OTHER SURGICAL HISTORY  90's    R hydrocelectomy    OTHER SURGICAL HISTORY      L&R Shoulder                Social History     Socioeconomic History    Marital status: Single   Tobacco Use    Smoking status: Never     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)    Smokeless tobacco: Never   Vaping Use    Vaping Use: Never used   Substance and Sexual Activity    Alcohol use: No    Drug use: No   Other Topics Concern    Caffeine Concern Yes     Comment: green tea    Exercise Yes     Comment: walking              Review of Systems    Positive for stated complaint: hit in the head a few days ago, had CT done. now having eye pain and rash to  fo*  Other systems are as noted in HPI.  Constitutional and vital signs reviewed.      All other systems reviewed and negative except as noted above.    Physical Exam     ED Triage Vitals [03/30/24 1236]   /63   Pulse 73   Resp 18   Temp 98.5 °F (36.9 °C)   Temp src Oral   SpO2 96 %   O2 Device None (Room air)       Current:/70   Pulse 62   Temp 98.5 °F (36.9 °C) (Oral)   Resp 17   Ht 172.7 cm (5' 8\")   Wt 63.5 kg   SpO2 99%   BMI 21.29 kg/m²         Physical Exam    General: .  Patient has a rash that does look vesicular over the left forehead.  The patient is in no respiratory distress    HEENT: Atraumatic, conjunctiva are not pale.  There is no icterus.  Oral mucosa Is wet.  No facial trauma.  The neck is supple.    LUNGS: Clear to auscultation, there is no wheezing or retraction.  No crackles.    CV: Cardiovascular is regular without murmurs or rubs.    ABD: The abdomen is soft nondistended nontender.  There is no rebound.  There is no guarding.    EXT: There is good pulses bilaterally.  There is no calf tenderness.  There is no rash noted.  There is no edema    NEURO: Alert and oriented x4.  Muscle strength and sensory exam is grossly normal.  And the patient is neurologically intact with no focal findings.  He has no pronator drift cranial nerves are grossly intact.  Pupils equal reactive.  Does not seem to involve the eye at this present time.e    ED Course   Labs Reviewed - No data to display             MDM      Visual acuity is slightly low in the left side..  He was wearing glasses.  The patient's exam is consistent with shingles.  The patient I did do fluorescein stain.  There was e was a slight uptake is hard to determine if there was any there was no new dendritic lesions..  I discussed with there is no obvious dendritic lesion but was concerned that it was close to the V1 distribution.  I did discuss this case with Dr. Zamudio from [facility] they will have him seen in the  office that she recommended norfloxacin, will start him on antivirals.  And also discussed with about starting erythromycin ointment she describes to antibiotics.  And valacyclovir which I did confirm with the pharmacist.  The concern is that this is a neurological event but he had a CT and is neurologically intact and he is got a new lesion this is consistent with shingles.  Vesicular lesion in the V1 distribution over his forehead.  Does not look cellulitic.  I discussed importance of close follow[facility] if he has increasing pain discomfort he is to return here.                                   Medical Decision Making      Disposition and Plan     Clinical Impression:  1. Herpes zoster with complication         Disposition:  Discharge  3/30/2024  4:18 pm    Follow-up:  Addy Zamudio MD  [address] [facility] [address] [phone]    Follow up in 2 day(s)      Addy Zamudio MD  [address] [address]  [phone]                Medications Prescribed:  Current Discharge Medication List        START taking these medications    Details   moxifloxacin 0.5 % Ophthalmic Solution Place 1 drop into the left eye 3 (three) times daily for 7 days.  Qty: 1 each, Refills: 0      erythromycin 5 MG/GM Ophthalmic Ointment Apply 1 Application to eye every 6 (six) hours for 7 days.  Qty: 1 g, Refills: 0      valACYclovir 1 G Oral Tab Take 1 tablet (1,000 mg total) by mouth 3 (three) times daily for 7 days.  Qty: 21 tablet, Refills: 0

## 2024-03-30 NOTE — DISCHARGE INSTRUCTIONS
Follow-up with ophthalmology.  If you have increasing pain or discomfort return here.  Take Tylenol for pain.  Return if increasing pain discomfort    You were seen in the emergency room in a limited time.  There is a possibility that although we do not see any acute process at this present time that things can change with time.  Is therefore imperative that you follow-up with primary care physician for close follow-up.  If there is any significant progression of your pain  or other symptoms you to return immediately to the emergency room.
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
## 2024-04-01 ENCOUNTER — TELEPHONE (OUTPATIENT)
Dept: NEUROLOGY | Facility: CLINIC | Age: 69
End: 2024-04-01

## 2024-04-01 ENCOUNTER — HOSPITAL ENCOUNTER (EMERGENCY)
Facility: HOSPITAL | Age: 69
Discharge: HOME | End: 2024-04-01
Attending: EMERGENCY MEDICINE
Payer: MEDICARE

## 2024-04-01 VITALS
TEMPERATURE: 98 F | WEIGHT: 138.88 LBS | BODY MASS INDEX: 21 KG/M2 | OXYGEN SATURATION: 99 % | RESPIRATION RATE: 16 BRPM | SYSTOLIC BLOOD PRESSURE: 125 MMHG | DIASTOLIC BLOOD PRESSURE: 68 MMHG | HEART RATE: 76 BPM

## 2024-04-01 DIAGNOSIS — B02.30 HERPES ZOSTER WITH OPHTHALMIC COMPLICATION, UNSPECIFIED HERPES ZOSTER EYE DISEASE: Primary | ICD-10-CM

## 2024-04-01 PROCEDURE — 99283 EMERGENCY DEPT VISIT LOW MDM: CPT

## 2024-04-01 PROCEDURE — 99284 EMERGENCY DEPT VISIT MOD MDM: CPT

## 2024-04-01 RX ORDER — GABAPENTIN 100 MG/1
100 CAPSULE ORAL 3 TIMES DAILY
Qty: 30 CAPSULE | Refills: 0 | Status: SHIPPED | OUTPATIENT
Start: 2024-04-01

## 2024-04-01 RX ORDER — GABAPENTIN 100 MG/1
100 CAPSULE ORAL ONCE
Status: COMPLETED | OUTPATIENT
Start: 2024-04-01 | End: 2024-04-01

## 2024-04-01 RX ORDER — PREDNISONE 20 MG/1
TABLET ORAL
Qty: 15 TABLET | Refills: 0 | Status: SHIPPED | OUTPATIENT
Start: 2024-04-01

## 2024-04-01 NOTE — TELEPHONE ENCOUNTER
Routed to provider for recommendations at when patient should be seen in office. Patient should follow up with PCP and eye provider.

## 2024-04-01 NOTE — ED PROVIDER NOTES
Patient Seen in: [facility] Emergency Department      History     Chief Complaint   Patient presents with    Eye Visual Problem     Stated Complaint: dx with shingles yesterday, today with eye swelling    Subjective:   Patient 68-year-old male presents emergency room for shingles of the left V1 distribution.  Patient had been seen in the emergency room with a negative CT this weekend and was seen yesterday for shingles rash.  He is to see eye specialist today in a few hours.  Patient is aware of this.  He was worried as he was having increased size of the rash.  Explained to the patient until the rash started to crust over that he will progress in nature.  He has been taking the medications as prescribed.  He is able to still see.  He reports that his pain is improved.  He does have exposed skin explained to the patient that he should avoid contact with pregnant women and will compromise her children under 1 year of age.    The history is provided by the patient.           Objective:   Past Medical History:   Diagnosis Date    Arthritis     Asthma (HCC)     Back pain     Back problem     Difficult intubation     pt unaware of any previous difficulties with intubation    Extrinsic asthma, unspecified     Osteoarthritis     PONV (postoperative nausea and vomiting)     Shortness of breath     Thyroid disease     Visual impairment               Past Surgical History:   Procedure Laterality Date    COLONOSCOPY  1/2015    COLONOSCOPY N/A 1/20/2015    Procedure: COLONOSCOPY;  Surgeon: Shayne Kirk MD;  Location:  ENDOSCOPY    HERNIA SURGERY      umbilical hernia    OTHER      L KNEE MENICUS REPAIR, R AND L SHOULDER ROTATOR CUFF  REPAIR    OTHER SURGICAL HISTORY  80's    L varicocelectomy    OTHER SURGICAL HISTORY  90's    R hydrocelectomy    OTHER SURGICAL HISTORY      L&R Shoulder                Social History     Socioeconomic History    Marital status: Single   Tobacco Use    Smoking status: Never      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       Positive for stated complaint: dx with shingles yesterday, today with eye swelling  Other systems are as noted in HPI.  Constitutional and vital signs reviewed.      All other systems reviewed and negative except as noted above.    Physical Exam     ED Triage Vitals [04/01/24 0112]   /68   Pulse 76   Resp 16   Temp 97.8 °F (36.6 °C)   Temp src Temporal   SpO2 99 %   O2 Device None (Room air)       Current:/68   Pulse 76   Temp 97.8 °F (36.6 °C) (Temporal)   Resp 16   Wt 63 kg   SpO2 99%   BMI 21.12 kg/m²         Physical Exam  Vitals and nursing note reviewed.   Constitutional:       General: He is not in acute distress.     Appearance: Normal appearance. He is normal weight. He is not toxic-appearing.   HENT:      Head: Atraumatic.      Comments: Adult male with V1 left-sided distribution including erythema of the left eyelid consistent with zoster infection no signs of cellulitis.  Extremity muscles intact  Eyes:      Extraocular Movements: Extraocular movements intact.      Pupils: Pupils are equal, round, and reactive to light.      Comments: Injected left conjunctiva patient currently on antibiotic ointment and drops   Cardiovascular:      Rate and Rhythm: Normal rate and regular rhythm.      Pulses: Normal pulses.   Pulmonary:      Effort: Pulmonary effort is normal.   Abdominal:      General: Bowel sounds are normal.      Tenderness: There is no abdominal tenderness. There is no guarding.   Musculoskeletal:         General: Normal range of motion.   Skin:     General: Skin is warm.      Capillary Refill: Capillary refill takes  less than 2 seconds.      Findings: Erythema and rash present.      Comments: Zoster lesions left V1 distribution   Neurological:      General: No focal deficit present.      Mental Status: He is alert and oriented to person, place, and time.   Psychiatric:         Mood and Affect: Mood normal.         Behavior: Behavior normal.      Comments: Anxious               ED Course   Labs Reviewed - No data to display                   MDM      Social -negative tobacco, negative etoh, negative drugs  Family History-noncontributory  Past Medical History-zoster, asthma, arthritis, thyroid disease    Differential diagnosis before testing included zoster, anxiety,    Co-morbidities that add to the complexity of management include: Recently diagnosed with zoster    Testing ordered during this visit included none    Radiographic images  None    External chart review showed review of care everywhere in [facility] system shows patient had a negative CT scan and was seen in the emergency room yesterday was a vies to follow-up with eye doctor on first available appointment on today.  Patient explained about shingles further including when he is infectious who he should avoid in the progression of the condition.  Also noted that he did not have any steroids or gabapentin ordered.    History obtained by an independent source included from patient    Discussion of management with patient    Social determinants of health that affect care include patient is to see eye doctor today      Medications Provided: Gabapentin, steroids    Course of Events during Emergency Room Visit include 68-year-old male presents emergency room for evaluation of zoster rash.  Extensive discussion with patient about the progression of zoster the importance of following up with the eye doctor as scheduled today and discussion with his doctor if they want to start with steroids however would give patient a prescription for them if they are okay with starting it will  give gabapentin in the meantime.  Patient to keep the area covered avoid contact with immunocompromised persons.  Patient to follow-up with primary care physician          Disposition:          Discharge  I have discussed with the patient the results of test, differential diagnosis, treatment plan, warning signs and symptoms which should prompt immediate return.  They expressed understanding of these instructions and agrees to the following plan provided.  They were given written discharge instructions and agrees to return for any concerns and voiced understanding and all questions were answered.                                      Medical Decision Making      Disposition and Plan     Clinical Impression:  1. Herpes zoster with ophthalmic complication, unspecified herpes zoster eye disease         Disposition:  Discharge  4/1/2024  2:08 am    Follow-up:  Rudolph Adamson MD  [address] [address] [phone]    Schedule an appointment as soon as possible for a visit      Addy Zamudio MD  [address] [facility] [address] [phone]    Schedule an appointment as soon as possible for a visit      Addy Zamudio MD  [address] [facility] [address] [phone]                Medications Prescribed:  Current Discharge Medication List        START taking these medications    Details   predniSONE 20 MG Oral Tab 1 tablet p.o. twice daily for 5 days, then take 1 tablet p.o. daily for 5 days  Qty: 15 tablet, Refills: 0      gabapentin 100 MG Oral Cap Take 1 capsule (100 mg total) by mouth 3 (three) times daily.  Qty: 30 capsule, Refills: 0

## 2024-04-01 NOTE — TELEPHONE ENCOUNTER
Patient is calling stating he was in the ED during the weekend for shingles on his face. Patient stated he has never pain in his pain and is asking if Dr. Cohen would like to see patient in office or an evaluation. Patient would like a call back, please advice.

## 2024-04-02 NOTE — TELEPHONE ENCOUNTER
Pt can follow up with me or my associates whoever available in 2-4 weeks, for severe or new symptoms, he should go ER.

## 2024-04-02 NOTE — TELEPHONE ENCOUNTER
Spoke with patient and informed him Dr Cohen recommends appointment in clinic within the next 2-4 weeks and nurse will watch his schedule for cancellation.

## 2024-04-13 ENCOUNTER — HOSPITAL ENCOUNTER (EMERGENCY)
Facility: HOSPITAL | Age: 69
Discharge: HOME | End: 2024-04-13
Attending: STUDENT IN AN ORGANIZED HEALTH CARE EDUCATION/TRAINING PROGRAM
Payer: MEDICARE

## 2024-04-13 ENCOUNTER — APPOINTMENT (OUTPATIENT)
Dept: GENERAL RADIOLOGY | Facility: HOSPITAL | Age: 69
End: 2024-04-13
Attending: STUDENT IN AN ORGANIZED HEALTH CARE EDUCATION/TRAINING PROGRAM
Payer: MEDICARE

## 2024-04-13 VITALS
RESPIRATION RATE: 18 BRPM | WEIGHT: 138.88 LBS | TEMPERATURE: 98 F | BODY MASS INDEX: 21 KG/M2 | DIASTOLIC BLOOD PRESSURE: 68 MMHG | OXYGEN SATURATION: 98 % | SYSTOLIC BLOOD PRESSURE: 118 MMHG | HEART RATE: 67 BPM

## 2024-04-13 DIAGNOSIS — R06.00 DYSPNEA, UNSPECIFIED TYPE: Primary | ICD-10-CM

## 2024-04-13 LAB
ALBUMIN SERPL-MCNC: 3.3 G/DL (ref 3.4–5)
ALBUMIN/GLOB SERPL: 1 {RATIO} (ref 1–2)
ALP LIVER SERPL-CCNC: 54 U/L
ALT SERPL-CCNC: 32 U/L
ANION GAP SERPL CALC-SCNC: 7 MMOL/L (ref 0–18)
AST SERPL-CCNC: 14 U/L (ref 15–37)
BASOPHILS # BLD AUTO: 0.07 X10(3) UL (ref 0–0.2)
BASOPHILS NFR BLD AUTO: 1 %
BILIRUB SERPL-MCNC: 0.6 MG/DL (ref 0.1–2)
BUN BLD-MCNC: 9 MG/DL (ref 9–23)
CALCIUM BLD-MCNC: 8.8 MG/DL (ref 8.5–10.1)
CHLORIDE SERPL-SCNC: 106 MMOL/L (ref 98–112)
CO2 SERPL-SCNC: 27 MMOL/L (ref 21–32)
CREAT BLD-MCNC: 0.95 MG/DL
EGFRCR SERPLBLD CKD-EPI 2021: 87 ML/MIN/1.73M2 (ref 60–?)
EOSINOPHIL # BLD AUTO: 0.07 X10(3) UL (ref 0–0.7)
EOSINOPHIL NFR BLD AUTO: 1 %
ERYTHROCYTE [DISTWIDTH] IN BLOOD BY AUTOMATED COUNT: 14.2 %
GLOBULIN PLAS-MCNC: 3.2 G/DL (ref 2.8–4.4)
GLUCOSE BLD-MCNC: 124 MG/DL (ref 70–99)
HCT VFR BLD AUTO: 43.8 %
HGB BLD-MCNC: 15.2 G/DL
IMM GRANULOCYTES # BLD AUTO: 0.04 X10(3) UL (ref 0–1)
IMM GRANULOCYTES NFR BLD: 0.6 %
LYMPHOCYTES # BLD AUTO: 1.89 X10(3) UL (ref 1–4)
LYMPHOCYTES NFR BLD AUTO: 27.3 %
MCH RBC QN AUTO: 33 PG (ref 26–34)
MCHC RBC AUTO-ENTMCNC: 34.7 G/DL (ref 31–37)
MCV RBC AUTO: 95.2 FL
MONOCYTES # BLD AUTO: 0.69 X10(3) UL (ref 0.1–1)
MONOCYTES NFR BLD AUTO: 10 %
NEUTROPHILS # BLD AUTO: 4.16 X10 (3) UL (ref 1.5–7.7)
NEUTROPHILS # BLD AUTO: 4.16 X10(3) UL (ref 1.5–7.7)
NEUTROPHILS NFR BLD AUTO: 60.1 %
OSMOLALITY SERPL CALC.SUM OF ELEC: 290 MOSM/KG (ref 275–295)
PLATELET # BLD AUTO: 188 10(3)UL (ref 150–450)
POTASSIUM SERPL-SCNC: 3.7 MMOL/L (ref 3.5–5.1)
PROT SERPL-MCNC: 6.5 G/DL (ref 6.4–8.2)
RBC # BLD AUTO: 4.6 X10(6)UL
SODIUM SERPL-SCNC: 140 MMOL/L (ref 136–145)
TROPONIN I SERPL HS-MCNC: 6 NG/L
WBC # BLD AUTO: 6.9 X10(3) UL (ref 4–11)

## 2024-04-13 PROCEDURE — 84484 ASSAY OF TROPONIN QUANT: CPT | Performed by: STUDENT IN AN ORGANIZED HEALTH CARE EDUCATION/TRAINING PROGRAM

## 2024-04-13 PROCEDURE — 93010 ELECTROCARDIOGRAM REPORT: CPT

## 2024-04-13 PROCEDURE — 71045 X-RAY EXAM CHEST 1 VIEW: CPT | Performed by: STUDENT IN AN ORGANIZED HEALTH CARE EDUCATION/TRAINING PROGRAM

## 2024-04-13 PROCEDURE — 99284 EMERGENCY DEPT VISIT MOD MDM: CPT

## 2024-04-13 PROCEDURE — 36415 COLL VENOUS BLD VENIPUNCTURE: CPT

## 2024-04-13 PROCEDURE — 80053 COMPREHEN METABOLIC PANEL: CPT | Performed by: STUDENT IN AN ORGANIZED HEALTH CARE EDUCATION/TRAINING PROGRAM

## 2024-04-13 PROCEDURE — 85025 COMPLETE CBC W/AUTO DIFF WBC: CPT | Performed by: STUDENT IN AN ORGANIZED HEALTH CARE EDUCATION/TRAINING PROGRAM

## 2024-04-13 PROCEDURE — 93005 ELECTROCARDIOGRAM TRACING: CPT

## 2024-04-13 NOTE — ED PROVIDER NOTES
Patient Seen in: [facility] Emergency Department      History     Chief Complaint   Patient presents with    Difficulty Breathing     Stated Complaint: DAYANA    Subjective:   HPI    Patient is a 68-year-old male presenting to emergency department for evaluation of difficulty breathing.  Patient reports that he has felt somewhat short of breath for the last few days.  He denies any chest pain, lower extremity edema, calf pain, fever, cough or other associated symptoms.  He reports having had previous palpitations for which he had seen his cardiologist and had a Holter done.  The palpitations did not recur.  He denies other associated symptoms at this time.    Objective:   Past Medical History:    Arthritis    Asthma (HCC)    Back pain    Back problem    Difficult intubation    pt unaware of any previous difficulties with intubation    Extrinsic asthma, unspecified    Osteoarthritis    PONV (postoperative nausea and vomiting)    Shortness of breath    Thyroid disease    Visual impairment              Past Surgical History:   Procedure Laterality Date    Colonoscopy  1/2015    Colonoscopy N/A 1/20/2015    Procedure: COLONOSCOPY;  Surgeon: hSayne Kirk MD;  Location:  ENDOSCOPY    Hernia surgery      umbilical hernia    Other      L KNEE MENICUS REPAIR, R AND L SHOULDER ROTATOR CUFF  REPAIR    Other surgical history  80's    L varicocelectomy    Other surgical history  90's    R hydrocelectomy    Other surgical history      L&R Shoulder                Social History     Socioeconomic History    Marital status: Single   Tobacco Use    Smoking status: Never     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)    Smokeless tobacco: Never   Vaping Use    Vaping status: Never Used   Substance and Sexual Activity    Alcohol use: No    Drug use: No   Other Topics Concern    Caffeine Concern Yes     Comment: green tea    Exercise Yes     Comment: walking     Social Determinants of Health     Physical  Activity: Sufficiently Active (12/23/2020)    [facility]    Exercise Vital Sign     Days of Exercise per Week: 7 days     Minutes of Exercise per Session: 30 min    [facility]    Social Connections    [facility]    Housing Stability              Review of Systems    Positive for stated complaint: DAYANA  Other systems are as noted in HPI.  Constitutional and vital signs reviewed.      All other systems reviewed and negative except as noted above.    Physical Exam     ED Triage Vitals [04/13/24 0910]   /76   Pulse 71   Resp 18   Temp 98.2 °F (36.8 °C)   Temp src    SpO2 100 %   O2 Device None (Room air)       Current:/68   Pulse 67   Temp 98.2 °F (36.8 °C)   Resp 18   Wt 63 kg   SpO2 98%   BMI 21.12 kg/m²         Physical Exam    Constitutional: No apparent distress  Eyes: No scleral icterus  Heart: regular rate rhythm, no murmurs  Lungs: Clear to auscultation bilaterally  Abdomen: Soft and nontender  Extremities: No lower extremity edema, no calf tenderness  Skin: No rash  Neuro: Alert and oriented ×3          ED Course/ My interpretations:     Labs Reviewed   COMP METABOLIC PANEL (14) - Abnormal; Notable for the following components:       Result Value    Glucose 124 (*)     AST 14 (*)     Albumin 3.3 (*)     All other components within normal limits   TROPONIN I HIGH SENSITIVITY - Normal   CBC WITH DIFFERENTIAL WITH PLATELET    Narrative:     The following orders were created for panel order CBC With Differential With Platelet.  Procedure                               Abnormality         Status                     ---------                               -----------         ------                     CBC W/ DIFFERENTIAL[[id number]]                              Final result                 Please view results for these  tests on the individual orders.   RAINBOW DRAW LAVENDER   RAINBOW DRAW LIGHT GREEN   RAINBOW DRAW BLUE   CBC W/ DIFFERENTIAL     EKG    Rate, intervals and axes as noted on EKG Report.  Rate: 66  Rhythm: Sinus Rhythm  Reading: Sinus rhythm with first-degree AV block, normal intervals, normal axis, no ST elevations.  No concerning change from prior EKG.             My independent imaging interpretation:      Procedures    CBC With Differential With Platelet    Comp Metabolic Panel (14)    Troponin I (High Sensitivity)    XR CHEST AP PORTABLE  (CPT=71045)        All available radiology reports for this visit reviewed.      Medications given:  Orders Placed This Encounter    CBC With Differential With Platelet    Comp Metabolic Panel (14)    Troponin I (High Sensitivity)                      MDM      Extensive differential diagnosis was considered including underlying musculoskeletal pain, acute MI, pericarditis, pneumonia, pneumothorax, cardiac, pulmonary, thromboembolic, gastrointestinal, vascular, infectious and other etiologies.    EKG was found to be nonischemic, troponin was unremarkable, remainder of the emergency department work-up was negative.   Though the clear etiology of patient's dyspnea is uncertain he exhibits no signs of severe shortness of breath necessitating intervention or admission and emergent medical conditions have been ruled out with workup in the ER.  Patient remained hemodynamically stable throughout their emergency department period of observation with no adverse events or symptoms reported by the patient.    Given patient's history, physical exam findings as well as work-up in the ER his clinical picture shows no signs of life-threatening pathology.  Patient can safely follow-up on outpatient basis.  Patient advised on the importance of follow-up and reasons to return to the ER if they were to worsen.  They demonstrated understanding, they are comfortable with the plan and will follow-up  as directed.      Disposition and Plan     Clinical Impression:  1. Dyspnea, unspecified type         Disposition:  Discharge  4/13/2024 10:29 am    Follow-up:  Rudolph Adamson MD  [address] [id number] [phone]    Schedule an appointment as soon as possible for a visit      Jose A Restrepo MD  [address] [address] [phone]    Follow up            Medications Prescribed:  Discharge Medication List as of 4/13/2024 10:33 AM                                 Documentation created with the aid of Dragon voice recognition software.  Although efforts were made to ensure the accuracy of the note, some inaccuracies may persist.

## 2024-04-13 NOTE — ED INITIAL ASSESSMENT (HPI)
Patient arrives to the ED via walk-in, ambulatory and A&O with c/o difficulty breathing. Symptoms started a couple days ago. States symptoms were worse last noc when he went to lay down. Thinks it has to do with his singles. Since shingles he has had some fatigue, DAYANA. COVID in February. Sees Lauro for Holter monitor. No CP at this time, \"just hard time breathing\".

## 2024-04-14 LAB
ATRIAL RATE: 66 BPM
P AXIS: 49 DEGREES
P-R INTERVAL: 250 MS
Q-T INTERVAL: 382 MS
QRS DURATION: 94 MS
QTC CALCULATION (BEZET): 400 MS
R AXIS: 13 DEGREES
T AXIS: 60 DEGREES
VENTRICULAR RATE: 66 BPM

## 2024-04-16 ENCOUNTER — HOSPITAL ENCOUNTER (OUTPATIENT)
Dept: CV DIAGNOSTICS | Facility: HOSPITAL | Age: 69
Discharge: HOME | End: 2024-04-16
Attending: INTERNAL MEDICINE
Payer: MEDICARE

## 2024-04-16 DIAGNOSIS — R07.89 OTHER CHEST PAIN: ICD-10-CM

## 2024-04-16 DIAGNOSIS — R94.31 ABNORMAL ELECTROCARDIOGRAM: ICD-10-CM

## 2024-04-16 PROCEDURE — 93306 TTE W/DOPPLER COMPLETE: CPT | Performed by: INTERNAL MEDICINE

## 2024-04-18 ENCOUNTER — OFFICE VISIT (OUTPATIENT)
Facility: CLINIC | Age: 69
End: 2024-04-18
Payer: MEDICARE

## 2024-04-18 VITALS
OXYGEN SATURATION: 99 % | SYSTOLIC BLOOD PRESSURE: 98 MMHG | DIASTOLIC BLOOD PRESSURE: 50 MMHG | RESPIRATION RATE: 16 BRPM | HEART RATE: 71 BPM | BODY MASS INDEX: 21.22 KG/M2 | WEIGHT: 140 LBS | HEIGHT: 68 IN

## 2024-04-18 DIAGNOSIS — R06.89 DYSPNEA AND RESPIRATORY ABNORMALITIES: Primary | ICD-10-CM

## 2024-04-18 DIAGNOSIS — R06.00 DYSPNEA AND RESPIRATORY ABNORMALITIES: Primary | ICD-10-CM

## 2024-04-18 PROCEDURE — 99214 OFFICE O/P EST MOD 30 MIN: CPT | Performed by: INTERNAL MEDICINE

## 2024-04-18 NOTE — PATIENT INSTRUCTIONS
Plan:  -- to use  albuterol inhaler 1-2 puffs  as needed for shortness of breath -   - call with any changes   - see me in 3  months     Lindsey James MD  Pulmonary Medicine  4/18/2024

## 2024-04-18 NOTE — PROGRESS NOTES
Pulmonary Progress Note    History of Present Illness:  Pierce Chou is a 68 year old male presenting to pulmonary clinic   Never got covid-   Thinks  some air pollution exposure-   Last 3 nights awakening and couldn't breath- - worse last night - then to ER   Given albuterol and not sure  it helped   Using exercise to help lungs-- walking - with mask and not effecting breathing   When sleeping- then awakening - as new thing- - awakens short of breath- can't sleep - -   Changes air filters -   Not short of breath now -   Had stress test - 2021     Hx sjogrens was following with lichon   Infected tooth pulled- feb- no issues     9.23- started with albuterol every night and no problem-- then weaned to every other night- then stopped - thinks related to bad air   A lot of air filters in his home   Has venous insuff-- numbness in both feet now -  to see neurologist - dr echevarria -- - this week - - no hx DM - thinks insuff blood flow   Toes are numb   Walks daily - with issue     4/24- shingles in left eye -- first with covid 2/13 - was sick- was vaccinated - one week later-- lump on left neck-- infected salivary gland - then jaw infection-- oral surgery -- couldn't eat for 2 weeks   Occasionally  palpiations post covid-- saw bryce- had holter - - to see him   Awoke in middle of the night-- couldn't breath -- no more palpitations  Went to er-- cxr with  hyperinflation - copd and aortic   Ongoing HA - with stabbing pain -   Cornea told OK- - was on antiviral   Was walking and biking without issue -- since covid - feels weaker - back walking without issue -   No coughing - no cp -   Some fatigue notes awakening rare if laying on back       Social History:   Social History     Socioeconomic History    Marital status: Single   Tobacco Use    Smoking status: Never     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)    Smokeless tobacco: Never   Vaping Use    Vaping status: Never Used   Substance and Sexual  Activity    Alcohol use: No    Drug use: No   Other Topics Concern    Caffeine Concern Yes     Comment: green tea    Exercise Yes     Comment: walking        Medications:   No current outpatient medications on file.       Review of Systems:    weight is stable - 138 in 2018 - 140 now   No swallowing issues - some tooth issues   No sinus issues -- no allergies   No cp   No palpations -   Sleeping well - up until recently - no awakneings - at times 4-5 am -   Follows O2 sats- always 98% -   Venous insuffiency - told - wears compression stockings - daily -  No ETOH   Arthtirs on hands - antiinflammatory diet  - no recent lichon visit ? [place]   Was on marissa for shingles       Physical Exam:  BP 98/50 (BP Location: Right arm, Patient Position: Sitting, Cuff Size: adult)   Pulse 71   Resp 16   Ht 5' 8\" (1.727 m)   Wt 140 lb (63.5 kg)   SpO2 99%   BMI 21.29 kg/m²    Constitutional: comfortable . No acute distress.   HEENT: Head NC/AT. PEERL. Throat is clear seems clear- shingles scars   Cardio: . Rrr - no murmer noted - no ectopy   Respiratory:  seems clear- no rales no wheeze - sl dullness minimal rt base   GI:  Abd soft, non-tender.  Extremities: No clubbing no edema with stockings on --some joint changes of arthritis- strong DP pulse   Neurologic: A&Ox3. No gross motor deficits.  Skin: Warm, dry.no rashes or hives noted   Lymphatic: No cervical or supraclavicular lymphadenopathy.no jvd   Psych: Calm, cooperative. Pleasant affect.     Results: reviewed     Assessment/Plan:  # dypsnea at night- last 3 nights/asthma   History in the past of pleural effusions remotely etiology was unclear and was seen by doctor Yeny  Intermittently since with dyspnea-has not required controller and PFTs 2017 with reversible mild airways obstruction and normal diffusion capacity.  States he has been well with no recent visits until change in air quality  Presented to the ER last night with clear chest x-ray and some subjective  improvement with albuterol  No evidence of heart issues  To begin albuterol prior to nighttime and as needed-   Had been doing well without any issues  9.23- now resolved -to use as needed   Saw dr wu - told first degree block--- to see as needed   4.24- denies and was exercising without limitation -- now with fatigue and weakness -post covid - - and shingles and jaw infection -- rare awakening - no snoring- supine with some noise ? Awakening -- to re-assess with recovery of covid and shingles     #History of Sjogren's syndrome    Thinks very stable on antiinflammatory diet   Sees dr posey periodically  Denies any need for medication and has never taken  9.23 - to see taty --denies any new issues  4.24- no recent visit - thinks stable     # subclinical hypothyroidism  Recent visit with normal TSH-weaned off thyroid medication for the past 4 months      #History of GERD in the past  Denies any symptoms and denies medication use    # neuropathy of toes   Some at night   Told venous insuff - pulse is strong   To see neuro -   No recent changes       -Plan:  -- to use  albuterol inhaler 1-2 puffs  as needed for shortness of breath -   - call with any changes   - see me in 3  months     Lindsey James MD  Pulmonary Medicine  4/18/2024

## 2024-06-06 ENCOUNTER — OFFICE VISIT (OUTPATIENT)
Facility: CLINIC | Age: 69
End: 2024-06-06
Payer: MEDICARE

## 2024-06-06 VITALS
BODY MASS INDEX: 21.22 KG/M2 | SYSTOLIC BLOOD PRESSURE: 108 MMHG | HEIGHT: 68 IN | DIASTOLIC BLOOD PRESSURE: 66 MMHG | WEIGHT: 140 LBS | HEART RATE: 63 BPM | OXYGEN SATURATION: 97 %

## 2024-06-06 DIAGNOSIS — R06.89 DYSPNEA AND RESPIRATORY ABNORMALITIES: ICD-10-CM

## 2024-06-06 DIAGNOSIS — G47.33 OSA (OBSTRUCTIVE SLEEP APNEA): Primary | ICD-10-CM

## 2024-06-06 DIAGNOSIS — R06.00 DYSPNEA AND RESPIRATORY ABNORMALITIES: ICD-10-CM

## 2024-06-06 PROCEDURE — 99214 OFFICE O/P EST MOD 30 MIN: CPT | Performed by: INTERNAL MEDICINE

## 2024-06-06 NOTE — PATIENT INSTRUCTIONS
Plan:  - use albuterol inhaler as needed   - to get sleep study   - call with any changes   - see me in 4 months     Lindsey James MD  Pulmonary Medicine  6/6/2024

## 2024-06-06 NOTE — PROGRESS NOTES
Pulmonary Progress Note    History of Present Illness:  Pierce Chou is a 68 year old male presenting to pulmonary clinic   Never got covid-   Thinks  some air pollution exposure-   Last 3 nights awakening and couldn't breath- - worse last night - then to ER   Given albuterol and not sure  it helped   Using exercise to help lungs-- walking - with mask and not effecting breathing   When sleeping- then awakening - as new thing- - awakens short of breath- can't sleep - -   Changes air filters -   Not short of breath now -   Had stress test - 2021     Hx sjogrens was following with lichon   Infected tooth pulled- feb- no issues     9.23- started with albuterol every night and no problem-- then weaned to every other night- then stopped - thinks related to bad air   A lot of air filters in his home   Has venous insuff-- numbness in both feet now -  to see neurologist - dr echevarria -- - this week - - no hx DM - thinks insuff blood flow   Toes are numb   Walks daily - with issue     4/24- shingles in left eye -- first with covid 2/13 - was sick- was vaccinated - one week later-- lump on left neck-- infected salivary gland - then jaw infection-- oral surgery -- couldn't eat for 2 weeks   Occasionally  palpiations post covid-- saw bryce- had holter - - to see him   Awoke in middle of the night-- couldn't breath -- no more palpitations  Went to er-- cxr with  hyperinflation - copd and aortic   Ongoing HA - with stabbing pain -   Cornea told OK- - was on antiviral   Was walking and biking without issue -- since covid - feels weaker - back walking without issue -   No coughing - no cp -   Some fatigue notes awakening rare if laying on back   6/24-- back walking 45min- had been weak after covid and shingles - watches air quality -   Eye is better- still with some problems -- eye pain is better -- vision is good and follows with cornea specialist every 6 months - - skin with blotches - using scar creme - and sunscreen-   Some  faitgue remains -   No coughing -         Social History:   Social History     Socioeconomic History    Marital status: Single   Tobacco Use    Smoking status: Never     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)    Smokeless tobacco: Never   Vaping Use    Vaping status: Never Used   Substance and Sexual Activity    Alcohol use: No    Drug use: No   Other Topics Concern    Caffeine Concern Yes     Comment: green tea    Exercise Yes     Comment: walking        Medications:   No current outpatient medications on file.       Review of Systems:    weight is stable - 138 in 2018 - 140 now - very stable - eats clean plant based - sardines -   No swallowing issues - some tooth issues   No sinus issues -- no allergies   No cp   No palpations -   Sleeping well - up until recently - no awakneings - at times 4-5 am - - pretty good now - some fatgiue   Follows O2 sats- always 98% -   Venous insuffiency - told - wears compression stockings - daily -  No ETOH   Arthtirs on hands - antiinflammatory diet  - no recent lichon visit ? Sjorgrens   Was on marissa for shingles   Some palpitations - saw cardio bryce to see in follow up - told extra beats - not noticing now       Physical Exam:  /66 (BP Location: Right arm, Patient Position: Sitting, Cuff Size: adult)   Pulse 63   Ht 5' 8\" (1.727 m)   Wt 140 lb (63.5 kg)   SpO2 97%   BMI 21.29 kg/m²    Constitutional: comfortable . No acute distress.   HEENT: Head NC/AT. PEERL. Throat is clear seems clear- shingles scars much improved   Cardio: . Rrr - no murmer noted - no ectopy   Respiratory:  seems clear- no rales no wheeze - all clear today   GI:  Abd soft, non-tender.  Extremities: No clubbing no edema with stockings on --some joint changes of arthritis- strong DP pulse   Neurologic: A&Ox3. No gross motor deficits.  Skin: Warm, dry.no rashes or hives noted   Lymphatic: No cervical or supraclavicular lymphadenopathy.no jvd   Psych: Calm, cooperative.  Pleasant affect.     Results: reviewed     Assessment/Plan:  # dypsnea at night- last 3 nights/asthma   History in the past of pleural effusions remotely etiology was unclear and was seen by doctor Yeny  Intermittently since with dyspnea-has not required controller and PFTs 2017 with reversible mild airways obstruction and normal diffusion capacity.  States he has been well with no recent visits until change in air quality  Presented to the ER last night with clear chest x-ray and some subjective improvement with albuterol  No evidence of heart issues  To begin albuterol prior to nighttime and as needed-   Had been doing well without any issues  9.23- now resolved -to use as needed   Saw dr wu - told first degree block--- to see as needed   4.24- denies and was exercising without limitation -- now with fatigue and weakness -post covid - - and shingles and jaw infection -- rare awakening - no snoring- supine with some noise ? Awakening -- to re-assess with recovery of covid and shingles   6/24- no further dyspnea at night - not at all -   ? EMMANUEL with fatigues and awakenings agrees to study    #History of Sjogren's syndrome    Thinks very stable on antiinflammatory diet   Sees dr posey periodically  Denies any need for medication and has never taken  9.23 - to see taty --denies any new issues  4.24- no recent visit - thinks stable     # subclinical hypothyroidism  Recent visit with normal TSH-weaned off thyroid medication for the past 4 months      #History of GERD in the past  Denies any symptoms and denies medication use  No sx now     # neuropathy of toes   Some at night   Told venous insuff - pulse is strong   To see neuro -   No recent changes     # palpitations   Post covid   Had moniter and saw bryce - - no meds   To see in follow up     # sleep disorder   Remains with fatigue and awakenings and lack of energy   Agrees to study         -Plan:  -  - to get sleep study   - call with any changes   - see  me in 4 months     Lindsye James MD  Pulmonary Medicine  6/6/2024

## 2024-06-24 ENCOUNTER — TELEPHONE (OUTPATIENT)
Dept: RHEUMATOLOGY | Facility: CLINIC | Age: 69
End: 2024-06-24

## 2024-06-24 ENCOUNTER — VIRTUAL PHONE E/M (OUTPATIENT)
Dept: RHEUMATOLOGY | Facility: CLINIC | Age: 69
End: 2024-06-24

## 2024-06-24 VITALS — WEIGHT: 140 LBS | BODY MASS INDEX: 21 KG/M2

## 2024-06-24 DIAGNOSIS — U07.1 COVID-19: ICD-10-CM

## 2024-06-24 DIAGNOSIS — M35.01 SICCA SYNDROME WITH KERATOCONJUNCTIVITIS (HCC): Primary | ICD-10-CM

## 2024-06-24 DIAGNOSIS — R20.0 NUMBNESS AND TINGLING OF BOTH FEET: ICD-10-CM

## 2024-06-24 DIAGNOSIS — R00.2 PALPITATIONS: ICD-10-CM

## 2024-06-24 DIAGNOSIS — G62.9 NEUROPATHY: ICD-10-CM

## 2024-06-24 DIAGNOSIS — R20.2 NUMBNESS AND TINGLING OF BOTH FEET: ICD-10-CM

## 2024-06-24 DIAGNOSIS — Z86.19 HISTORY OF SHINGLES: ICD-10-CM

## 2024-06-24 PROCEDURE — 99442 PHONE E/M BY PHYS 11-20 MIN: CPT | Performed by: INTERNAL MEDICINE

## 2024-06-24 RX ORDER — ALBUTEROL SULFATE 1.25 MG/3ML
SOLUTION RESPIRATORY (INHALATION)
COMMUNITY
Start: 2024-04-23

## 2024-06-24 RX ORDER — GARLIC EXTRACT 500 MG
1 CAPSULE ORAL DAILY
COMMUNITY

## 2024-06-24 RX ORDER — UBIDECARENONE 200 MG
CAPSULE ORAL AS DIRECTED
COMMUNITY

## 2024-06-24 RX ORDER — ACETAMINOPHEN 160 MG
2000 TABLET,DISINTEGRATING ORAL DAILY
COMMUNITY

## 2024-06-24 RX ORDER — MELATONIN
COMMUNITY
Start: 2022-12-23

## 2024-06-24 NOTE — PROGRESS NOTES
Telephone appointment - audio only  Office visit canceled due back pain.    Pierce Chou consents to a virtual/telephone check in service on 6/24/24..  Patient understands and accepts financial responsibility for any deductible, coinsurance and/or co-pays associated with the service.    Duration of the service: 20 minutes     [facility]  6/24/2024 [facility]  Dr. Meredith Progress Note  Subjective:   Pierce Chou is a(n) 68 year old male.   Current complaints:   Chief Complaint   Patient presents with    Follow - Up     Injured lower back, does not know how or when injury occurred. Will be seeing pain specialist orthopedic provider referred pt to at end of week. Taking Advil for pain management. Hard to sit, lay down, or stand for long periods of time. Still c/o numbness in left foot, following up with neuro in July. Would like to further discuss possible sjogrens.   History of dry mouth/sicca syndrome and osteoarthritis of the right knee.   Also neuropathy of both feet.    Having low back pain now.  Using Advil for the back pain.   Has neurologist visit coming up .  Uses multiple supplements.    Had Covid in February, then next Shingles 3 weeks later, on forehead and the left eye was closing.  Had multiple eye visits.  Had eye pain, luckily cornea was ok.  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  Had extra heart beat after Covid.  Saw Dr. David Restrepo, 7 day monitor showed extra beats but nothing serious.  No therapy used.    August of last year numbness of the feet started.  EMG at [facility] neurology - showed polyneuropathy.    Seeing a pain specialist in 2 days for low back pain.  Told by orthopedics has mild arthritis in his back.  Objective:   Telephone visit - lasted 20  minutes.    10/6/23  MRI Lumbar Spine  - Borderline central spinal canal narrowing.  Mild bilateral L3-L4 neural foraminal stenosis.  Assessment:     Encounter Diagnoses   Name Primary?    Sicca syndrome with  keratoconjunctivitis (HCC) Yes    Neuropathy     Numbness and tingling of both feet     COVID-19     History of shingles     Palpitations      Plan:     Patient Instructions   Recommend rest, using ice or heat on your low back.  Avoid heavy lifting.  Avoid excessive bending.  Take ibuprofen 1 or 2 tablets twice a day.  If the back continues to hurt then you will need physical therapy.  Neuropathy of the feet which is causing numbness in your feet, treatment would be either medications or cover up burning and tingling and numbness such as Neurontin or Lyrica or Cymbalta.  The other option would be if we could figure out what caused the neuropathy.  Is possible that Sjogren's syndrome could cause neuropathy but I would not recommend immunosuppressive medicine for the neuropathy.  You need to discuss the neuropathy with your neurologist and see if he has some other ideas of what are safe treatments for neuropathy.  Continue to drink plenty of water and use artificial tears for dry eyes.  Biotene mouthwash and Biotene products for dry mouth.  Is not unusual after COVID infection for people get palpitations.  Follow-up with cardiology about this.  For mild joint pain you can either take extreme Tylenol or ibuprofen on as-needed basis.  Continue to take a multivitamin daily nad vitamin D daily it is okay to take fish oil.  Return to office for recheck 6 months.        Jose Meredith MD 6/24/2024 10:21 AM

## 2024-06-24 NOTE — TELEPHONE ENCOUNTER
Patient called at 10am to cxl his 10:20 appt, stating he was in to much back pain to make it in, patient is doing a phone visit instead

## 2024-06-24 NOTE — PATIENT INSTRUCTIONS
Recommend rest, using ice or heat on your low back.  Avoid heavy lifting.  Avoid excessive bending.  Take ibuprofen 1 or 2 tablets twice a day.  If the back continues to hurt then you will need physical therapy.  Neuropathy of the feet which is causing numbness in your feet, treatment would be either medications or cover up burning and tingling and numbness such as Neurontin or Lyrica or Cymbalta.  The other option would be if we could figure out what caused the neuropathy.  Is possible that Sjogren's syndrome could cause neuropathy but I would not recommend immunosuppressive medicine for the neuropathy.  You need to discuss the neuropathy with your neurologist and see if he has some other ideas of what are safe treatments for neuropathy.  Continue to drink plenty of water and use artificial tears for dry eyes.  Biotene mouthwash and Biotene products for dry mouth.  Is not unusual after COVID infection for people get palpitations.  Follow-up with cardiology about this.  For mild joint pain you can either take extreme Tylenol or ibuprofen on as-needed basis.  Continue to take a multivitamin daily nad    vitamin D daily it is okay to take fish oil.  Return to office for recheck 6 months.

## 2024-06-26 ENCOUNTER — TELEPHONE (OUTPATIENT)
Facility: CLINIC | Age: 69
End: 2024-06-26

## 2024-06-26 NOTE — TELEPHONE ENCOUNTER
Spoke with Dr. James regarding pt's MCM. MD states she's fine with pt's decision. MCM sent to pt making him aware.

## 2024-06-26 NOTE — TELEPHONE ENCOUNTER
Pt called to cancel appt. On 7/18/24 would like to inform that he is not able to have sleep study done injured his back so he 's dealing with this issue now

## 2024-06-27 ENCOUNTER — HOSPITAL ENCOUNTER (OUTPATIENT)
Dept: MRI IMAGING | Age: 69
Discharge: HOME | End: 2024-06-27
Attending: NURSE PRACTITIONER
Payer: MEDICARE

## 2024-06-27 DIAGNOSIS — M54.16 LUMBAR RADICULOPATHY: ICD-10-CM

## 2024-06-27 PROCEDURE — 72148 MRI LUMBAR SPINE W/O DYE: CPT | Performed by: NURSE PRACTITIONER

## 2024-10-06 ENCOUNTER — APPOINTMENT (OUTPATIENT)
Dept: GENERAL RADIOLOGY | Facility: HOSPITAL | Age: 69
End: 2024-10-06
Attending: EMERGENCY MEDICINE
Payer: MEDICARE

## 2024-10-06 ENCOUNTER — HOSPITAL ENCOUNTER (EMERGENCY)
Facility: HOSPITAL | Age: 69
Discharge: HOME | End: 2024-10-06
Attending: EMERGENCY MEDICINE
Payer: MEDICARE

## 2024-10-06 VITALS
WEIGHT: 135 LBS | HEIGHT: 68 IN | DIASTOLIC BLOOD PRESSURE: 63 MMHG | HEART RATE: 63 BPM | BODY MASS INDEX: 20.46 KG/M2 | OXYGEN SATURATION: 98 % | SYSTOLIC BLOOD PRESSURE: 127 MMHG | RESPIRATION RATE: 24 BRPM | TEMPERATURE: 98 F

## 2024-10-06 DIAGNOSIS — R53.83 FATIGUE: ICD-10-CM

## 2024-10-06 DIAGNOSIS — R07.89 CHEST TIGHTNESS: Primary | ICD-10-CM

## 2024-10-06 LAB
ALBUMIN SERPL-MCNC: 4.2 G/DL (ref 3.2–4.8)
ALBUMIN/GLOB SERPL: 1.5 {RATIO} (ref 1–2)
ALP LIVER SERPL-CCNC: 63 U/L
ALT SERPL-CCNC: 27 U/L
ANION GAP SERPL CALC-SCNC: 2 MMOL/L (ref 0–18)
AST SERPL-CCNC: 22 U/L (ref ?–34)
BASOPHILS # BLD AUTO: 0.06 X10(3) UL (ref 0–0.2)
BASOPHILS NFR BLD AUTO: 1.1 %
BILIRUB SERPL-MCNC: 0.9 MG/DL (ref 0.2–1.1)
BUN BLD-MCNC: 13 MG/DL (ref 9–23)
CALCIUM BLD-MCNC: 9.8 MG/DL (ref 8.7–10.4)
CHLORIDE SERPL-SCNC: 107 MMOL/L (ref 98–112)
CO2 SERPL-SCNC: 31 MMOL/L (ref 21–32)
CREAT BLD-MCNC: 0.93 MG/DL
D DIMER PPP FEU-MCNC: <0.27 UG/ML FEU (ref ?–0.7)
EGFRCR SERPLBLD CKD-EPI 2021: 88 ML/MIN/1.73M2 (ref 60–?)
EOSINOPHIL # BLD AUTO: 0.06 X10(3) UL (ref 0–0.7)
EOSINOPHIL NFR BLD AUTO: 1.1 %
ERYTHROCYTE [DISTWIDTH] IN BLOOD BY AUTOMATED COUNT: 12.7 %
GLOBULIN PLAS-MCNC: 2.8 G/DL (ref 2–3.5)
GLUCOSE BLD-MCNC: 99 MG/DL (ref 70–99)
HCT VFR BLD AUTO: 43.6 %
HGB BLD-MCNC: 15.6 G/DL
IMM GRANULOCYTES # BLD AUTO: 0.01 X10(3) UL (ref 0–1)
IMM GRANULOCYTES NFR BLD: 0.2 %
LYMPHOCYTES # BLD AUTO: 1.36 X10(3) UL (ref 1–4)
LYMPHOCYTES NFR BLD AUTO: 24.6 %
MAGNESIUM SERPL-MCNC: 2.1 MG/DL (ref 1.6–2.6)
MCH RBC QN AUTO: 33.5 PG (ref 26–34)
MCHC RBC AUTO-ENTMCNC: 35.8 G/DL (ref 31–37)
MCV RBC AUTO: 93.8 FL
MONOCYTES # BLD AUTO: 0.74 X10(3) UL (ref 0.1–1)
MONOCYTES NFR BLD AUTO: 13.4 %
NEUTROPHILS # BLD AUTO: 3.29 X10 (3) UL (ref 1.5–7.7)
NEUTROPHILS # BLD AUTO: 3.29 X10(3) UL (ref 1.5–7.7)
NEUTROPHILS NFR BLD AUTO: 59.6 %
NT-PROBNP SERPL-MCNC: 202 PG/ML (ref ?–125)
OSMOLALITY SERPL CALC.SUM OF ELEC: 290 MOSM/KG (ref 275–295)
PLATELET # BLD AUTO: 156 10(3)UL (ref 150–450)
POTASSIUM SERPL-SCNC: 3.9 MMOL/L (ref 3.5–5.1)
PROT SERPL-MCNC: 7 G/DL (ref 5.7–8.2)
RBC # BLD AUTO: 4.65 X10(6)UL
SODIUM SERPL-SCNC: 140 MMOL/L (ref 136–145)
TROPONIN I SERPL HS-MCNC: 3 NG/L
TROPONIN I SERPL HS-MCNC: <3 NG/L
WBC # BLD AUTO: 5.5 X10(3) UL (ref 4–11)

## 2024-10-06 PROCEDURE — 85025 COMPLETE CBC W/AUTO DIFF WBC: CPT | Performed by: EMERGENCY MEDICINE

## 2024-10-06 PROCEDURE — 84443 ASSAY THYROID STIM HORMONE: CPT

## 2024-10-06 PROCEDURE — 85379 FIBRIN DEGRADATION QUANT: CPT | Performed by: EMERGENCY MEDICINE

## 2024-10-06 PROCEDURE — 36415 COLL VENOUS BLD VENIPUNCTURE: CPT

## 2024-10-06 PROCEDURE — 80053 COMPREHEN METABOLIC PANEL: CPT | Performed by: EMERGENCY MEDICINE

## 2024-10-06 PROCEDURE — 83880 ASSAY OF NATRIURETIC PEPTIDE: CPT | Performed by: EMERGENCY MEDICINE

## 2024-10-06 PROCEDURE — 93010 ELECTROCARDIOGRAM REPORT: CPT

## 2024-10-06 PROCEDURE — 99285 EMERGENCY DEPT VISIT HI MDM: CPT

## 2024-10-06 PROCEDURE — 83735 ASSAY OF MAGNESIUM: CPT | Performed by: EMERGENCY MEDICINE

## 2024-10-06 PROCEDURE — 84484 ASSAY OF TROPONIN QUANT: CPT | Performed by: EMERGENCY MEDICINE

## 2024-10-06 PROCEDURE — 71045 X-RAY EXAM CHEST 1 VIEW: CPT | Performed by: EMERGENCY MEDICINE

## 2024-10-06 PROCEDURE — 93005 ELECTROCARDIOGRAM TRACING: CPT

## 2024-10-06 NOTE — DISCHARGE INSTRUCTIONS
You were evaluated in the Emergency Department today for chest pain. Your work up was reassuring.     Return to the Emergency Department if you experience worsening or uncontrolled chest pain, shortness of breath, light headedness, feeling faint, nausea, vomiting, or any other concerning symptoms.    Please schedule an appointment for follow up with your primary care physician as soon as possible. You will be called by cardiology to schedule a close follow up appointment with your cardiologist.

## 2024-10-06 NOTE — ED PROVIDER NOTES
Patient Seen in: [facility] Emergency Department      History     Chief Complaint   Patient presents with    Chest Pain Angina     Reports chest tightness since Friday after stressful days previously. Also reports Dx of AV abnormality.     Stated Complaint: Chest pain    Subjective:   HPI  Patient is a 69-year-old male with a history of hypothyroidism, a fib not on AC, asthma who presents to the ED for evaluation of chest tightness since Friday. Chest tightness is constant, left sided, nonradiating, nonexertional, nonpleuritic. No SOB, lightheadedness  Started nattokinase and vit D with potassium last week and patient is concerned this could be causing his symptoms. No injury or trauma.     Echo 4/16/24 reviewed:   1. Left ventricle: The cavity size was normal. Wall thickness was normal. Systolic function was normal. The estimated ejection fraction was 60-65%, by visual assessment. No diagnostic evidence for regional wall motion abnormalities. Left ventricular diastolic function parameters were normal.   2. Aortic valve: There was mild regurgitation.   Impressions:  This study is compared with previous dated 03-03-21: No significant change since prior study.       Objective:     Past Medical History:    Arthritis    Asthma (HCC)    Back pain    Back problem    Difficult intubation    pt unaware of any previous difficulties with intubation    Extrinsic asthma, unspecified    Osteoarthritis    Polyneuropathy associated with underlying disease (HCC)    PONV (postoperative nausea and vomiting)    Shortness of breath    Thyroid disease    Visual impairment              Past Surgical History:   Procedure Laterality Date    Colonoscopy  1/2015    Colonoscopy N/A 1/20/2015    Procedure: COLONOSCOPY;  Surgeon: Shayne Kirk MD;  Location:  ENDOSCOPY    Hernia surgery      umbilical hernia    Other      L KNEE MENICUS REPAIR, R AND L SHOULDER ROTATOR CUFF  REPAIR    Other surgical history  80's    L varicocelectomy     Other surgical history  90's    R hydrocelectomy    Other surgical history      L&R Shoulder                Social History     Socioeconomic History    Marital status: Single   Tobacco Use    Smoking status: Never     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)    Smokeless tobacco: Never   Vaping Use    Vaping status: Never Used   Substance and Sexual Activity    Alcohol use: No    Drug use: No   Other Topics Concern    Caffeine Concern Yes     Comment: green tea    Exercise Yes     Comment: walking     Social Determinants of Health     Physical Activity: Sufficiently Active (12/23/2020)    Received from [facility]    Exercise Vital Sign     Days of Exercise per Week: 7 days     Minutes of Exercise per Session: 30 min    [facility] from [facility], [facility]    Social Connections    Received from [facility], [facility]    Housing Stability                  Physical Exam     ED Triage Vitals   BP 10/06/24 0854 132/64   Pulse 10/06/24 0854 75   Resp 10/06/24 0854 18   Temp 10/06/24 0854 98 °F (36.7 °C)   Temp src 10/06/24 0854 Temporal   SpO2 10/06/24 0915 98 %   O2 Device 10/06/24 0854 None (Room air)       Current Vitals:   Vital Signs  BP: 127/63  Pulse: 63  Resp: 24  Temp: 98 °F (36.7 °C)  Temp src: Temporal  MAP (mmHg): 82    Oxygen Therapy  SpO2: 98 %  O2 Device: None (Room air)        Physical Exam  Vitals and nursing note reviewed.   Constitutional:       General: He is not in acute distress.  HENT:      Head: Normocephalic and atraumatic.      Nose: Nose normal.      Mouth/Throat:      Mouth: Mucous membranes are moist.   Eyes:      Extraocular Movements: Extraocular movements intact.      Pupils: Pupils are equal, round, and reactive to light.   Cardiovascular:      Rate and Rhythm: Normal rate and regular rhythm.      Pulses: Normal pulses.   Pulmonary:      Effort:  Pulmonary effort is normal. No respiratory distress.      Breath sounds: No wheezing.   Abdominal:      General: There is no distension.      Palpations: Abdomen is soft.   Musculoskeletal:         General: No swelling. Normal range of motion.      Cervical back: Normal range of motion.   Skin:     General: Skin is warm and dry.      Capillary Refill: Capillary refill takes less than 2 seconds.   Neurological:      General: No focal deficit present.      Mental Status: He is alert.   Psychiatric:         Mood and Affect: Mood normal.             ED Course     Labs Reviewed   PRO BETA NATRIURETIC PEPTIDE - Abnormal; Notable for the following components:       Result Value    Pro-Beta Natriuretic Peptide 202 (*)     All other components within normal limits   COMP METABOLIC PANEL (14) - Normal   TROPONIN I HIGH SENSITIVITY - Normal   MAGNESIUM - Normal   D-DIMER - Normal   TROPONIN I HIGH SENSITIVITY - Normal   CBC WITH DIFFERENTIAL WITH PLATELET   RAINBOW DRAW LAVENDER   RAINBOW DRAW LIGHT GREEN   RAINBOW DRAW BLUE   RAINBOW DRAW GOLD     EKG    Rate, intervals and axes as noted on EKG Report.  Rate: 78  Rhythm: Sinus Rhythm  Reading: NSR with ventricular rate of 78, no acute ST changes, 1st degree AV block with MA of 264             Independent interpretation of imaging : CXR and noted no acute process, chronic blunting of R costophrenic angle similar on prior CXR         MDM      Patient is a 69-year-old male with a history of hypothyroidism, asthma who presents to the ED for evaluation of chest tightness since Friday. Patient arrives to ED afebrile, hemodynamically stable and satting well on room air.  On exam patient is not toxic appearing. Pt does appear very anxious. He has normal cardiopulmonary exam. Suspect that anxiety could be contributing to patient's symptoms as his chest pain is not exertional and he has no other associated symptoms with it. He also reports symptoms started shortly after he was stressed  about an interaction with someone else. Pt is low risk for PE using well's but will obtain D-dimer.     ED Course as of 10/06/24 1954  ------------------------------------------------------------  Time: 10/06 1028  Comment: CBC, CMP unremarkable. Trop neg. . CXR shows no acute process.   ------------------------------------------------------------  Time: 10/06 1249  Comment: Pt re-evaluated. Remains HDS, well appearing. Pt feels better after discussing work up. Shared decision making used. Pt has HEART score of 3 which is low risk.Offered admission but pt does feel comfortable going home and f/u as outpatient. I did discuss with [facility] who will arrange for close f/u for outpatient stress test.     Patient is stable for discharge home with close PCP f/u. Discussed strict return precautions and supportive care. Patient verbalized understanding and agreement of plan.            Records from previous encounters were reviewed from : echocardiogram 4/16/24 and It was noted that pt has normal EF  Differential diagnosis included : ACS, atypical chest pain, anxiety, MSK pain, GERD, PE     Management of patient was discussed with : cardiology ([facility]) and patient        Hospitalization was considered : yes      Medical Decision Making      Disposition and Plan     Clinical Impression:  1. Chest tightness         Disposition:  Discharge  10/6/2024 12:50 pm    Follow-up:  Rudolph Adamson MD  [address] [id number] [phone]    Schedule an appointment as soon as possible for a visit in 1 day(s)      [address] [address] [phone]  Schedule an appointment as soon as possible for a visit            Medications Prescribed:  Discharge Medication List as of 10/6/2024 12:58 PM              Supplementary Documentation:

## 2024-10-07 LAB
ATRIAL RATE: 78 BPM
P AXIS: 79 DEGREES
P-R INTERVAL: 264 MS
Q-T INTERVAL: 390 MS
QRS DURATION: 96 MS
QTC CALCULATION (BEZET): 444 MS
R AXIS: 23 DEGREES
T AXIS: 62 DEGREES
VENTRICULAR RATE: 78 BPM

## 2024-10-08 ENCOUNTER — LAB ENCOUNTER (OUTPATIENT)
Dept: LAB | Facility: HOSPITAL | Age: 69
End: 2024-10-08
Attending: FAMILY MEDICINE
Payer: MEDICARE

## 2024-10-08 DIAGNOSIS — R53.83 FATIGUE: Primary | ICD-10-CM

## 2024-10-08 LAB — TSI SER-ACNC: 0.62 MIU/ML (ref 0.55–4.78)

## 2024-10-11 ENCOUNTER — HOSPITAL ENCOUNTER (OUTPATIENT)
Dept: CV DIAGNOSTICS | Facility: HOSPITAL | Age: 69
Discharge: HOME | End: 2024-10-11
Attending: NURSE PRACTITIONER
Payer: MEDICARE

## 2024-10-11 DIAGNOSIS — R53.83 FATIGUE: ICD-10-CM

## 2024-10-11 DIAGNOSIS — R94.31 ABNORMAL EKG: ICD-10-CM

## 2024-10-11 DIAGNOSIS — R00.2 PALPITATIONS: ICD-10-CM

## 2024-10-11 PROCEDURE — 93017 CV STRESS TEST TRACING ONLY: CPT | Performed by: NURSE PRACTITIONER

## 2024-10-11 PROCEDURE — 93350 STRESS TTE ONLY: CPT | Performed by: NURSE PRACTITIONER

## 2024-10-11 PROCEDURE — 93018 CV STRESS TEST I&R ONLY: CPT | Performed by: NURSE PRACTITIONER

## 2024-10-15 ENCOUNTER — OFFICE VISIT (OUTPATIENT)
Facility: CLINIC | Age: 69
End: 2024-10-15
Payer: MEDICARE

## 2024-10-15 VITALS
SYSTOLIC BLOOD PRESSURE: 112 MMHG | OXYGEN SATURATION: 99 % | BODY MASS INDEX: 20.46 KG/M2 | HEART RATE: 62 BPM | WEIGHT: 135 LBS | RESPIRATION RATE: 16 BRPM | HEIGHT: 68 IN | DIASTOLIC BLOOD PRESSURE: 52 MMHG

## 2024-10-15 DIAGNOSIS — R06.00 DYSPNEA AND RESPIRATORY ABNORMALITIES: Primary | ICD-10-CM

## 2024-10-15 DIAGNOSIS — J45.20 MILD INTERMITTENT ASTHMA WITHOUT COMPLICATION (HCC): ICD-10-CM

## 2024-10-15 DIAGNOSIS — R06.89 DYSPNEA AND RESPIRATORY ABNORMALITIES: Primary | ICD-10-CM

## 2024-10-15 PROCEDURE — 99214 OFFICE O/P EST MOD 30 MIN: CPT | Performed by: INTERNAL MEDICINE

## 2024-10-15 NOTE — PATIENT INSTRUCTIONS
-Plan:    - to get sleep study please   - call with any changes   - see me in 4 months     Lindsey James MD  Pulmonary Medicine  10/15/2024

## 2024-10-15 NOTE — PROGRESS NOTES
Pulmonary Progress Note    History of Present Illness:  Pierce Chou is a 69 year old male presenting to pulmonary clinic   Never got covid-   Thinks  some air pollution exposure-   Last 3 nights awakening and couldn't breath- - worse last night - then to ER   Given albuterol and not sure  it helped   Using exercise to help lungs-- walking - with mask and not effecting breathing   When sleeping- then awakening - as new thing- - awakens short of breath- can't sleep - -   Changes air filters -   Not short of breath now -   Had stress test - 2021     Hx sjogrens was following with lichon   Infected tooth pulled- feb- no issues     9.23- started with albuterol every night and no problem-- then weaned to every other night- then stopped - thinks related to bad air   A lot of air filters in his home   Has venous insuff-- numbness in both feet now -  to see neurologist - dr echevarria -- - this week - - no hx DM - thinks insuff blood flow   Toes are numb   Walks daily - with issue     4/24- shingles in left eye -- first with covid 2/13 - was sick- was vaccinated - one week later-- lump on left neck-- infected salivary gland - then jaw infection-- oral surgery -- couldn't eat for 2 weeks   Occasionally  palpiations post covid-- saw bryce- had holter - - to see him   Awoke in middle of the night-- couldn't breath -- no more palpitations  Went to er-- cxr with  hyperinflation - copd and aortic   Ongoing HA - with stabbing pain -   Cornea told OK- - was on antiviral   Was walking and biking without issue -- since covid - feels weaker - back walking without issue -   No coughing - no cp -   Some fatigue notes awakening rare if laying on back   6/24-- back walking 45min- had been weak after covid and shingles - watches air quality -   Eye is better- still with some problems -- eye pain is better -- vision is good and follows with cornea specialist every 6 months - - skin with blotches - using scar creme - and sunscreen-   Some  faitgue remains -   No coughing -     10/24- more palpitations - started post COVID - due to see skaluba - thinks causing chest pain- left lateral along left anterior axillary line- - comes and goes - 2-3 hours - - holter with 2.2% 6 months ago   Had  repeat stress test - had a lot of PACs   Able to exercise   No afib   No dyspnea noted - 105 hr in his bike- no shortness of breath   No coughing - no cp with biking   Nattokinesis ?        Social History:   Social History     Socioeconomic History    Marital status: Single   Tobacco Use    Smoking status: Never     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)    Smokeless tobacco: Never   Vaping Use    Vaping status: Never Used   Substance and Sexual Activity    Alcohol use: No    Drug use: No   Other Topics Concern    Caffeine Concern Yes     Comment: green tea    Exercise Yes     Comment: walking        Medications:   Current Outpatient Medications   Medication Sig Dispense Refill    Albuterol Sulfate 1.25 MG/3ML Inhalation Nebu Soln albuterol sulfate 1.25 mg/3 mL solution for nebulization, [RxNorm: 461109]      Coenzyme Q10 200 MG Oral Cap Take by mouth As Directed.      cyanocobalamin 1000 MCG Oral Tab Vitamin B-12  1,000 mcg tablet, [RxNorm: 083315]      TURMERIC OR Take by mouth.      Cristin, Zingiber officinalis, (CRISTIN OR) Take by mouth.      cholecalciferol 50 MCG (2000 UT) Oral Cap Take 1 capsule (2,000 Units total) by mouth daily.      Omega-3 Fatty Acids (FISH OIL) 300 MG Oral Cap Take by mouth.      MILK THISTLE OR Take by mouth.      acidophilus-pectin Oral Cap Take 1 capsule by mouth daily. (Patient not taking: Reported on 10/15/2024)         Review of Systems:    weight is stable - 138 in 2018 - 140 now - 5# down - eats clean plant based - sardines -   No swallowing issues - some tooth issues   No sinus issues -- no allergies   No cp   No palpations -   Sleeping well - up until recently - no awakneings - at times 4-5 am - - pretty  good now - some fatgiue   Follows O2 sats- always 98% -   Venous insuffiency - told - wears compression stockings - daily -  Ongoing nueuropthy cause unknown   No ETOH   Arthtirs on hands - antiinflammatory diet  - no recent lichon visit ? Sjorgrens   Was on marissa for shingles   Increased palpitations - saw cardio bryce to see in follow up -2.2% on Holter-resurgance now and to see       Physical Exam:  /52   Pulse 62   Resp 16   Ht 5' 8\" (1.727 m)   Wt 135 lb (61.2 kg)   SpO2 99%   BMI 20.53 kg/m²    Constitutional: comfortable . No acute distress.   HEENT: Head NC/AT. PEERL. Throat is clear seems clear- shingles scars much improved   Cardio: . Rrr - no murmer noted - no ectopy   Respiratory:  seems clear- no rales no wheeze - all clear today   GI:  Abd soft, non-tender.  Extremities: No clubbing no edema with stockings on --some joint changes of arthritis- strong DP pulse   Neurologic: A&Ox3. No gross motor deficits.  Skin: Warm, dry.no rashes or hives noted   Lymphatic: No cervical or supraclavicular lymphadenopathy.no jvd   Psych: Calm, cooperative. Pleasant affect.     Results: reviewed     Assessment/Plan:  # dypsnea at night- last 3 nights/asthma   History in the past of pleural effusions remotely etiology was unclear and was seen by doctor Yeny  Intermittently since with dyspnea-has not required controller and PFTs 2017 with reversible mild airways obstruction and normal diffusion capacity.  States he has been well with no recent visits until change in air quality  Presented to the ER last night with clear chest x-ray and some subjective improvement with albuterol  No evidence of heart issues  To begin albuterol prior to nighttime and as needed-   Had been doing well without any issues  9.23- now resolved -to use as needed   Saw dr wu - told first degree block--- to see as needed   4.24- denies and was exercising without limitation -- now with fatigue and weakness -post covid - - and  shingles and jaw infection -- rare awakening - no snoring- supine with some noise ? Awakening -- to re-assess with recovery of covid and shingles   6/24- no further dyspnea at night - not at all -   ? EMMANUEL with fatigues and awakenings agrees to study  10/24- did not go-- suggested - no more awakenings -remains with very rare albuterol use-essentially no symptoms and remains able to exercise without limitation    #History of Sjogren's syndrome    Thinks very stable on antiinflammatory diet   Sees dr posey periodically  Denies any need for medication and has never taken  9.23 - to see taty --denies any new issues  4.24- no recent visit - thinks stable   10/24 - no new issues     # subclinical hypothyroidism  Recent visit with normal TSH-weaned off thyroid medication for the past 4 months  10/24- all normal blood work reported      #History of GERD in the past  Denies any symptoms and denies medication use  No sx now     # neuropathy of toes   Some at night   Told venous insuff - pulse is strong   To see neuro -   No recent changes etiology remains unclear    # palpitations   Post covid   Had moniter and saw skmeriba - - no meds   To see in follow up   10/24- increased PACs now and was in er- - had stress with ST depression  and to see skmeriba -cleared for beta-blocker or calcium channel blocker    # sleep disorder   Remains with fatigue and awakenings and lack of energy   Agrees to study   10/24- discussed association with PACs-- agrees         -Plan:    - to get sleep study please   - call with any changes   - see me in 4 months     Lindsey James MD  Pulmonary Medicine  10/15/2024

## 2024-11-06 ENCOUNTER — HOSPITAL ENCOUNTER (EMERGENCY)
Facility: HOSPITAL | Age: 69
Discharge: HOME | End: 2024-11-06
Attending: EMERGENCY MEDICINE
Payer: MEDICARE

## 2024-11-06 ENCOUNTER — APPOINTMENT (OUTPATIENT)
Dept: GENERAL RADIOLOGY | Facility: HOSPITAL | Age: 69
End: 2024-11-06
Attending: EMERGENCY MEDICINE
Payer: MEDICARE

## 2024-11-06 VITALS
TEMPERATURE: 97 F | BODY MASS INDEX: 20.46 KG/M2 | RESPIRATION RATE: 18 BRPM | OXYGEN SATURATION: 100 % | SYSTOLIC BLOOD PRESSURE: 143 MMHG | HEART RATE: 82 BPM | WEIGHT: 135 LBS | DIASTOLIC BLOOD PRESSURE: 79 MMHG | HEIGHT: 68 IN

## 2024-11-06 DIAGNOSIS — R07.9 CHEST PAIN OF UNCERTAIN ETIOLOGY: Primary | ICD-10-CM

## 2024-11-06 LAB
ALBUMIN SERPL-MCNC: 3.9 G/DL (ref 3.2–4.8)
ALBUMIN/GLOB SERPL: 1.5 {RATIO} (ref 1–2)
ALP LIVER SERPL-CCNC: 63 U/L
ALT SERPL-CCNC: 39 U/L
ANION GAP SERPL CALC-SCNC: 5 MMOL/L (ref 0–18)
AST SERPL-CCNC: 22 U/L (ref ?–34)
ATRIAL RATE: 76 BPM
BASOPHILS # BLD AUTO: 0.04 X10(3) UL (ref 0–0.2)
BASOPHILS NFR BLD AUTO: 0.7 %
BILIRUB SERPL-MCNC: 0.5 MG/DL (ref 0.2–1.1)
BUN BLD-MCNC: 12 MG/DL (ref 9–23)
CALCIUM BLD-MCNC: 9.7 MG/DL (ref 8.7–10.4)
CHLORIDE SERPL-SCNC: 106 MMOL/L (ref 98–112)
CO2 SERPL-SCNC: 28 MMOL/L (ref 21–32)
CREAT BLD-MCNC: 0.86 MG/DL
EGFRCR SERPLBLD CKD-EPI 2021: 93 ML/MIN/1.73M2 (ref 60–?)
EOSINOPHIL # BLD AUTO: 0.1 X10(3) UL (ref 0–0.7)
EOSINOPHIL NFR BLD AUTO: 1.6 %
ERYTHROCYTE [DISTWIDTH] IN BLOOD BY AUTOMATED COUNT: 12.8 %
GLOBULIN PLAS-MCNC: 2.6 G/DL (ref 2–3.5)
GLUCOSE BLD-MCNC: 113 MG/DL (ref 70–99)
HCT VFR BLD AUTO: 41.7 %
HGB BLD-MCNC: 14.3 G/DL
IMM GRANULOCYTES # BLD AUTO: 0.02 X10(3) UL (ref 0–1)
IMM GRANULOCYTES NFR BLD: 0.3 %
LYMPHOCYTES # BLD AUTO: 1.36 X10(3) UL (ref 1–4)
LYMPHOCYTES NFR BLD AUTO: 22.2 %
MCH RBC QN AUTO: 32.9 PG (ref 26–34)
MCHC RBC AUTO-ENTMCNC: 34.3 G/DL (ref 31–37)
MCV RBC AUTO: 96.1 FL
MONOCYTES # BLD AUTO: 0.86 X10(3) UL (ref 0.1–1)
MONOCYTES NFR BLD AUTO: 14 %
NEUTROPHILS # BLD AUTO: 3.75 X10 (3) UL (ref 1.5–7.7)
NEUTROPHILS # BLD AUTO: 3.75 X10(3) UL (ref 1.5–7.7)
NEUTROPHILS NFR BLD AUTO: 61.2 %
OSMOLALITY SERPL CALC.SUM OF ELEC: 289 MOSM/KG (ref 275–295)
P AXIS: 78 DEGREES
P-R INTERVAL: 298 MS
PLATELET # BLD AUTO: 152 10(3)UL (ref 150–450)
POTASSIUM SERPL-SCNC: 4.1 MMOL/L (ref 3.5–5.1)
PROT SERPL-MCNC: 6.5 G/DL (ref 5.7–8.2)
Q-T INTERVAL: 390 MS
QRS DURATION: 92 MS
QTC CALCULATION (BEZET): 438 MS
R AXIS: 17 DEGREES
RBC # BLD AUTO: 4.34 X10(6)UL
SODIUM SERPL-SCNC: 139 MMOL/L (ref 136–145)
T AXIS: 77 DEGREES
TROPONIN I SERPL HS-MCNC: 3 NG/L
VENTRICULAR RATE: 76 BPM
WBC # BLD AUTO: 6.1 X10(3) UL (ref 4–11)

## 2024-11-06 PROCEDURE — 93005 ELECTROCARDIOGRAM TRACING: CPT

## 2024-11-06 PROCEDURE — 85025 COMPLETE CBC W/AUTO DIFF WBC: CPT | Performed by: EMERGENCY MEDICINE

## 2024-11-06 PROCEDURE — 80053 COMPREHEN METABOLIC PANEL: CPT | Performed by: EMERGENCY MEDICINE

## 2024-11-06 PROCEDURE — 71045 X-RAY EXAM CHEST 1 VIEW: CPT | Performed by: EMERGENCY MEDICINE

## 2024-11-06 PROCEDURE — 99285 EMERGENCY DEPT VISIT HI MDM: CPT

## 2024-11-06 PROCEDURE — 36415 COLL VENOUS BLD VENIPUNCTURE: CPT

## 2024-11-06 PROCEDURE — 84484 ASSAY OF TROPONIN QUANT: CPT | Performed by: EMERGENCY MEDICINE

## 2024-11-06 PROCEDURE — 93010 ELECTROCARDIOGRAM REPORT: CPT

## 2024-11-06 PROCEDURE — 99284 EMERGENCY DEPT VISIT MOD MDM: CPT

## 2024-11-06 RX ORDER — ASPIRIN 81 MG/1
324 TABLET, CHEWABLE ORAL ONCE
Status: COMPLETED | OUTPATIENT
Start: 2024-11-06 | End: 2024-11-06

## 2024-11-06 RX ORDER — OMEGA-3S/DHA/EPA/FISH OIL/D3 1150-1000
15 LIQUID (ML) ORAL DAILY
COMMUNITY

## 2024-11-06 RX ORDER — MILK THISTLE 150 MG
1 CAPSULE ORAL
COMMUNITY

## 2024-11-06 NOTE — HISTORICAL OFFICE NOTE
[facility]  [address] [place] [address], [address] [place] [address]  [phone]      Pierce Chou  Progress Note  Demographics:  Name: Pierce Chou YOB: 1955  Age: 69, Male Medical Record No: [id number]  Visited Date/Time: 10/31/2024 08:40 AM    Chief Complaints  6 month follow up   \"Palpitations\"  Anxiety  PACs  ABN EKG  Chest pain  History of Present Illness  69-year-old gentleman coming to the clinic for 1 month follow-up.    He still reports dyspnea on exertion and some palpitations.  It is going on since COVID infection in early 2024.  Brief episodes with no fainting.  No acute cardiopulmonary symptoms.  He is using bronchodilators from Dr. James pulmonologist.  There is also anxiety component.    He was in ER again with some anxiety and brief shortness of breath with palpitations but sent home.  Troponin was negative -April 13, 2024.  EKG was negative for ischemia.  He was in sinus.    We asked for cardiac testing.  Patient declined nuclear stress test worry about isotope injection reactions with no prior history of issues with isotope so [facility] changed to stress echo.    Stress echo showed nonspecific EKG changes with 1 to 1.5 mm ST segment depression but with resting non-specific changes in ECG and resting and stress echo images were normal negative for wall motion abnormalities and he had no angina on the treadmill.  He performed 9 minutes on Karlos protocol achieving 90% of heart rate that he wanted with no angina.  No further angiogram was recommended per APN and I agree with her assessment.  Patient had nonspecific EKG changes already at baseline and did not meet criteria for ischemia with no angina and no wall motion abnormalities.    APN ordered ambulatory EKG monitoring for a week -April 2024 and it showed 2.2% of PACs and rare PVCs with no A-fib.  Patient was in sinus with a heart rate between 54 and 105 bpm and average rate of 74 bpm with no  pauses.    Beta-blocker was prescribed only low-dose metoprolol succinate 12.5 mg p.o. daily but since he does not like to take any medications but only vitamins and supplements he cut it in half because he was worried about low blood pressure and low pulse.  So he takes metoprolol succinate 6.25 mg p.o. daily for palpitations for PVCs and history of palpitations.  He was dizzy on apparently 12.5 mg p.o. daily.  Please    Significant anxiety component.    He was also in ER for atypical chest pain October 6, 2024.  EKG negative for ischemia with nonspecific changes x-ray was negative and he was sent home.    There were rare PACs during exercise stress test.    Normal TSH with hypothyroidism.    PFTs with mild obstruction with air trapping.  No congestion on x-ray.  COPD-like changes on x-ray.    He reported brief recurrent fluttering sensation in the chest - rec episodes lasting few seconds, he has bad days and good days with palpitations since Covid time.     We asked for 1 week ambulatory EKG monitoring -April 2024 sinus with a heart rate between 54 and 105 bpm average pulse 74 bpm with no A-fib or other prognostically significant arrhythmia. -Occasional to frequent PACs accounting for 2.2% of Holter time and rare PVCs.  No conduction issues.    Echo Doppler -April 16, 2024 -LVEF 60 to 65% with no LVH and no wall motion abnormalities.  Normal diastology.  1+ AI and no other valvular abnormalities.  Normal RV function and size and no pericardial effusion.  TAPSE 2.8 cm.  Normal chamber sizes.    Patient experienced COVID 19 infection on mid February 2024. No hospitalization. Then he developed palpitations.  He describes some racing heart rate and fluttering sensation in the chest but no dizziness.  Some atypical chest discomfort with palpitations but no exertional angina. He had infection of left side of the neck post covid - more bacterial superimposed infection - pus was drained by oral surgeon on 3/4/2024 - he  finished antibiotic course. It healed and no fever.    Patient was seen in our clinic for atypical chest pain with anxiety and borderline proBNP but no CHF in early 2023 and cardiac workup was unremarkable with as needed follow-up.  Echo Doppler was unremarkable with normal systolic and excellent diastolic function but no significant valvular abnormalities.  Patient symptoms of atypical CP and body aches were related to anxiety and panic attack.  He had mild first-degree AV block at that time with normal heart rate and no ischemia in EKG.  He is vegan.  Patient had negative stress test in 2019 and 2021 and there was no need to repeat that.    EKG in the clinic -March 26, 2024 -sinus 73 bpm with nonspecific changes but no acute ischemia or pathological Q waves.  Normal axis and normal intervals with mild first-degree AV block known from the past.  Other intervals were normal.    Blood pressure is controlled.  Patient does not take any cardiac meds on regular basis but supplements and vitamins.    ER visit - on 7/12/2023 - for dyspnea and another anxiety attack. Normal saturation and normal CXR - SOB came with anxiety/panic.     He complains of mild chronic leg edema but compression stocking helped.  There was some component of venous insufficiency.  No fluid overload by physical exam.  He also had recent atypical chest pain and occasional dyspnea on exertion with anxiety episodes.  No shortness of breath if he is calm.    Another anxiety ER visit - January 13, 2023 - panic attack after taking first dose of levothyroxine from PCP for hypothyroidism.  He became shaky and concerned about the new medication but it was not true a side effect of the medication.  Cardiac work-up was negative.  He was in sinus with no ischemia and troponin was negative.  He was sent home.    CXR - mild Asthma / COPD, no congestion. Trop (-), d-dimer (-) and ECG (-) in ER in July 2023. Other labs unremarkable. Chol at goal. TSH  normal.    Patient also went to emergency room with atypical chest pain on Merlin Day 2023.  It was left lateral chest wall discomfort not angina and lasting few seconds to few minutes, intermittent episodes on Merlin Day and Christmas eve.  Associated hyperventilation and anxiety.  No prior history of thromboembolic event.  Troponins were negative and proBNP was 339 and pro-BNP value triggered initial cardiology consult.     Laboratory data -April 13, 2024 -normal CBC glucose 124 potassium 3.7 sodium 140 creatinine 0.95 and normal liver enzymes.  Troponin negative.    Laboratory data -February 2024 -A1c 5.2% glucose 103 sodium 138 potassium 4.2 normal kidney and liver function with negative connective tissue panel and TSH normal 1.670 with normal metabolic panel and normal CBC.  Sjogren antibodies negative.  Sed rate 7.    We asked for echo Doppler -completed on December 28, 2022 -normal LVEF 60% with excellent diastolic function with no wall motion abnormalities and no LVH.  Normal pulmonary pressures.  Normal chamber sizes.  Normal RV systolic function and no pericardial effusion.  Trace to mild AI.    Venous ultrasound of lower extremity was negative for DVT by ER Merlin Day.    Arterial ultrasound of lower extremities -October 2023 -unremarkable with mild to moderate focal disease seen proximal left anterior tibial artery but otherwise no significant study.  Overall no evidence of occlusion or high-grade stenosis in both lower extremities.  Possible small vessel disease in the great toe of the right foot.    Patient has neuropathy in lower extremities.  He saw neurologist who performed nerve study which showed slowing of conduction velocities in sensory nerves.  No significant neuropathy.  Patient is also scheduled for EMG.    Chest x-ray was negative for acute changes and no congestion in ER on Merlin Day.  There was no CHF.    Patient denies any cardiac history or cardiac procedures.  He does not  take any cardiac medications on regular basis.  He takes vitamins and supplements.    He has arthritis with history of both shoulder surgeries and left knee meniscus repair, MRI of right knee was negative for any fracture and positive for arthritis in early December 2022.  Patient has been followed by rheumatologist Dr. Meredith.  He has primary osteoarthritis of the knee and shoulders and hands and also sicca syndrome.    EKG in office - December 27, 2022 - sinus with no ischemia 93 bpm with mild first-degree AV block  msec. Minor nonspecific changes of ST segments.  No pathological Q waves.    Echo Doppler -March 2021 -LVEF 60 to 65% with no wall motion abnormalities and no LVH.  Normal diastolic function.  1+ AI and preserved RV function.  Bubble study negative.  No significant valvular abnormalities otherwise.  No change as compared to echo December 2020.    Nuclear stress test -negative for ischemia with nonspecific 1.5 mm ST segment depression but nonischemic in May 2018.    Stress echo -negative for ischemia with nonspecific and nonischemic changes in June 2021.    He is single and denies smoking or alcohol abuse.  Family history negative for early CAD.  Currently has NYHA Class 1 symptoms.  Cardiac risk factors Never smoked  Past Medical History  1.Palpitations  2.Fatigue  3.Abnormal ECG  4.Chronic pain syndrome  5.PAC (premature atrial contraction)  6.Atypical chest pain  7.DDD (degenerative disc disease), lumbar  8.Numbness and tingling of both feet  9.Acquired hypothyroidism  10.Cerebral atrophy, mild  11.Abdominal pain, generalized  12.Abdominal bloating  13.Anxiety  14.Active asthma  15.Elevated brain natriuretic peptide (BNP) level  16.Vitiligo  17.Scrotal cyst  18.Asymptomatic spider veins of both lower extremities  Family History  1. Mother - Transient ischemic attack (TIA)  Social History  Smoking status Never smoked  Tobacco usage - No (Non-smoker (finding))  Review of  systems  Constitutional Fatigue  Gastrointestinal No history of Abdominal pain  Cardiovascular No history of Chest pain, ALBARRAN, Palpitations, Syncope, PND, Orthopnea, Edema and Claudication  Musculoskeletal Arthritis  No history of Arthralgias and Myalgias  Respiratory No history of SOB  Neurological No history of Numbness, Limb weakness, Speech problems, Poor balance and Unsteady gait  Psychiatric Depression and Anxiety  Integumentary No history of Rashes and Skin changes  Physical Examination  Vitals Right Arm Sitting  / 62 mmHg, Pulse rate 67 bpm, Regular, Height in 5' 8\", BMI: 20.5, Weight in 135 lbs (or) 61 kgs and BSA : 1.71 cc/m²  General Appearance No Acute Distress  Head/Eyes/Ears/Nose/Mouth/Throat Conjunctiva pink, Sclera Clear, PERRLA, Mucous membranes Moist and No pallor  Neck Normal carotid pulsations, No carotid bruits and No JVD  Respiratory Unlabored and Lungs clear with normal breath sounds  Cardiovascular Intact distal pulses and Regular rhythm. Normal rate present. Normal and normal S1 and S2    Gastrointestinal Abdomen soft, Non-tender, Normoactive bowel sounds and No organomegaly  Strength and tone Normal muscle strength  Lower Extremities Pulses 2+ and equal bilaterally and No edema  Skin Warm and dry and No rashes or lesions  Neurologic / Psychiatric Alert and Oriented  Speech Normal speech  Allergies  1.cephalexin - Ingredient(Reaction:Hives, Severity:Severe)  Medications  1.albuterol sulfate 1.25 mg/3 mL solution for nebulization  2.Fish OiL 1,600 mg-500 mg-800 mg/5 mL oral liquid, liquid (oral) one teaspoon once a day.  3.glucosamine-chondroitin 1,500 mg -1,200 mg/30 mL oral liquid, (oral) once a day.  4.Qunol Moe CoQ10 100 mg capsule, Take 1 capsule orally once a day. CoQ10 100mg w/ Vitamin E 68mg  5.Silymarin 300mg, 1 tablet once daily  6.turmeric 450 mg-turmeric root extract 50 mg capsule, Take 1 capsule orally once a day.  7.Vitamin B-12 1,000 mcg tablet, Take 1 tablet orally once  a day.  8.vitamin D3 125 mcg (5,000 unit)-vitamin K2 100 mcg capsule, Take 1 capsule orally once a day.  Impression  1.Palpitations  2.Fatigue  3.Abnormal ECG  4.Chronic pain syndrome  5.PAC (premature atrial contraction)  6.ALBARRAN (dyspnea on exertion)  7.Atypical chest pain  8.DDD (degenerative disc disease), lumbar  9.Numbness and tingling of both feet  10.Acquired hypothyroidism  11.Anxiety  12.Elevated brain natriuretic peptide (BNP) level  Assessment & Plan  1.  Patient is occasional palpitations with what he describes actually heart beats at the bedtime when he is listening to heart beats which is normal.  He has anxiety.  He had 2% PACs on prior ambulatory EKG monitoring but does not want to take any Cardizem or beta-blocker even low-dose.  It does not need to be treated there is nothing sustained and he does not feel anything now.  Reassurance was given to the patient.  2.  Recommend 1 week ambulatory EKG monitoring in 6 months couple weeks prior to the next visit -like to assess PVCs if they progress or they disappear.  3.  No cardiac medications at present time.  He does not need ones and he does not want to take any medications besides vitamins and supplements.  He is vegan.   4.  No need for another stress test with his negative ischemic workup twice in 2019 and 2021 and echo Doppler was unremarkable in April 2024.  He also had stress echo in October 2024.  He had nonspecific EKG changes and did not meet criteria for ischemia.  Nonspecific EKG changes at baseline as well and stress echo and the resting echo images were normal with no wall motion abnormalities.  5.  Blood pressure is normal and there is no need to initiate any blood pressure medication now.  He does not want to take even metoprolol 6.25 mg p.o. daily because it lowers his pressure and pulse but nothing pathological.  We do not need to treat 2% of PACs and he does not even feel it.  We have to respect his wishes.  6.  He is vegan and his  cholesterol profile was normal in 2023.  No need for antilipid agent.  7.  Good diet and staying active was emphasized.  8.  He has anxiety issues and positive thinking was emphasized.  9.  Positive thinking was emphasized with anxiety and history of panic attack.  10.  Follow-up with me 6 months or sooner if any new cardiac symptoms appear.  Patient has anxiety and it does not help with palpitations and has low threshold to go to ER.    11. X-ray and PFT showed mild COPD and he uses as needed albuterol and followed by Dr. James pulmonologist.  12.  7-day ambulatory EKG monitoring in 6 months and then see me after.  Anxiety control.    Discussed with the patient.  Copy to PCP.  Future appointments  1.Referral Visit - Rudolph Adamosn ([email or web address]) : (Today)  Miscellaneous  1.Weight monitoring (regime/therapy)  2.Reviewed ambulatory telemetry monitor with the patient.  Nurses documentation  Triage - Nursing Doc:  Upcoming surgeries: no   Use of assistive devices(s): no   Refills: no  Verbal order for EKG: no  Triage & medication list reviewed by: AUSTIN   Diagnostics Details  ACTMonitoring 03/29/2024  1.This is an excellent quality study.    2.Predominant rhythm is normal sinus rhythm.    3.The minimum heart rate recorded was 54 beats / minute. The maximum heart rate is 105 beats / minute. The mean heart rate is 74 beats / minute.    4.First degree AV block noted.    5.Afib [place] 0%    6.No pauses were noted.    Trans Thoracic Echocardiogram 12/28/2022  1.The study quality is average.    2.The left ventricle is normal in size, wall thickness, and global left ventricular systolic function. The left ventricular ejection fraction is 60%. Left ventricular diastolic function is normal and even excellent for his biological age. No regional wall motion abnormality is noted.    3.Mild (1+) aortic regurgitation.    4.The estimated pulmonary artery systolic pressure is 28 mmHg, normal.    5.Normal chamber  sizes.    6.Normal RV systolic function.    7.No pericardial effusion.    Care Providers: Jose A Restrepo MD and Angelia Betancourt CMA  Electronically Authenticated by  Jose A Restrepo MD  10/31/2024 08:08:46 PM  Disclaimer: Components of this note were documented using voice recognition system and are subject to errors not corrected at proofreading. Contact the author of this note for any clarifications.

## 2024-11-06 NOTE — ED INITIAL ASSESSMENT (HPI)
Pt ambulated into the ED from home with c/o left-sided chest pain that lasted for 15 minutes, around 1775-1470 last night and then went to bed- pain is still resolved. Pt states he is unsure if it is anxiety or indigestion.     Pt here on 10/6 for similar problem, saw cardiologist and has a stress-test.

## 2024-11-06 NOTE — CONSULTS
[facility]  Report of Consultation    Pierce Chou Patient Status:  Emergency    1955 MRN [id number]   Location [facility] EMERGENCY DEPARTMENT Attending Scooter Rojas MD   Hosp Day # 0 PCP Rudolph Adamson MD     Reason for Consultation:  Chest pain    History of Present Illness:  Pierce Chou is a a(n) 69 year old male presenting with left sided chest pain that occurred at rest.  No associated shortness of breath, diaphoresis or nausea.  Resolved spontaneously.  No recurrence. ECG and troponin normal.    Recent ED visit in 10/2024 for same.  Had stress echo  with normal imaging.  Seen by Dr. Restrepo and felt no indication for invasive testing.    Denies palpitations or syncope.    History:  Past Medical History:    Arthritis    Asthma (HCC)    Back pain    Back problem    Difficult intubation    pt unaware of any previous difficulties with intubation    Extrinsic asthma, unspecified    Osteoarthritis    Polyneuropathy associated with underlying disease (HCC)    PONV (postoperative nausea and vomiting)    Shortness of breath    Thyroid disease    Visual impairment     Past Surgical History:   Procedure Laterality Date    Colonoscopy  2015    Colonoscopy N/A 2015    Procedure: COLONOSCOPY;  Surgeon: Shayne Kirk MD;  Location:  ENDOSCOPY    Hernia surgery      umbilical hernia    Other      L KNEE MENICUS REPAIR, R AND L SHOULDER ROTATOR CUFF  REPAIR    Other surgical history  80's    L varicocelectomy    Other surgical history  90's    R hydrocelectomy    Other surgical history      L&R Shoulder     Family History   Problem Relation Age of Onset    Stroke Mother     Diabetes Brother       reports that he has never smoked. He has been exposed to tobacco smoke. He has never used smokeless tobacco. He reports that he does not drink alcohol and does not use drugs.    Allergies:  Allergies[1]    Medications:  No current facility-administered medications for this encounter.    Review of  Systems:  All systems were reviewed and are negative except as described above in HPI.    Physical Exam:  Blood pressure 144/80, pulse 84, resp. rate 18, height 5' 8\" (1.727 m), weight 135 lb (61.2 kg), SpO2 100%.  No data recorded.    Wt Readings from Last 3 Encounters:   11/06/24 135 lb (61.2 kg)   10/15/24 135 lb (61.2 kg)   10/06/24 135 lb (61.2 kg)       Telemetry: SR  General: Alert and oriented in no apparent distress.  HEENT: No focal deficits.  Neck: No JVD, carotids 2+ no bruits.  Cardiac: Regular rate and rhythm, S1, S2 normal, no murmur, rub or gallop.  Lungs: Clear without wheezes, rales, rhonchi or dullness.  Normal excursions and effort.  Abdomen: Soft, non-tender.   Extremities: Without clubbing, cyanosis or edema.  Peripheral pulses are 2+.  Neurologic: Alert and oriented, normal affect.  Skin: Warm and dry.     Laboratories and Data:  Diagnostics:  EKG: SR, normal      Labs:   Lab Results   Component Value Date    WBC 6.1 11/06/2024    RBC 4.34 11/06/2024    HGB 14.3 11/06/2024    HCT 41.7 11/06/2024    MCV 96.1 11/06/2024    MCH 32.9 11/06/2024    MCHC 34.3 11/06/2024    RDW 12.8 11/06/2024    .0 11/06/2024     Lab Results   Component Value Date    PT 14.6 08/23/2012    INR 1.10 02/13/2022    INR 1.10 03/02/2021    INR 1.23 (H) 09/24/2017       Assessment/Plan:  Patient Active Problem List   Diagnosis    Vitiligo    Hydrocele, unspecified    Primary osteoarthritis of left knee    Chronic pain syndrome    Degenerative lumbar disc    Impingement syndrome of right shoulder    Asthma (HCC)    Avulsion of fingernail of left hand    Chest pain, atypical    Abnormal cardiovascular stress test    Scrotal cyst    Sicca syndrome with keratoconjunctivitis (HCC)    Pain in both hands    Spider veins of both lower extremities    Expressive aphasia    Nonintractable headache, unspecified chronicity pattern, unspecified headache type    Numbness and tingling of both feet    Cerebral atrophy, mild (HCC)     Chronic fatigue    Elevated C-reactive protein (CRP)    Generalized abdominal pain    Bloated abdomen    Irritable bowel syndrome without diarrhea    Primary osteoarthritis of right knee    Neuropathy    COVID-19    History of shingles    Palpitations    Chest pain of uncertain etiology       Chest pain   - Non-cardiac   - ECG and troponin normal; recent normal stress test   - Ok for discharge with follow up with Dr. Lauro Vann MD  11/6/2024  6:40 AM         [1]   Allergies  Allergen Reactions    Cephalexin HIVES     Has taken amoxicillin in the past

## 2024-11-06 NOTE — ED PROVIDER NOTES
Patient Seen in: [facility] Emergency Department      History     Chief Complaint   Patient presents with    Pain     Stated Complaint: Pain in left side chest early, resolved now    Subjective:   HPI      Patient is a 69-year-old male presents to ED for evaluation of left-sided chest pain.  Patient states he was sitting down at his kitchen table around 9 PM this past evening when he developed left-sided chest pain described as sharp lasting for about 10 or 15 minutes.  Only 1 episode tonight.  It was nonradiating.  Denied associated shortness of breath, vomiting or diaphoresis.  Patient denies fever or cough.  He states he was seen in the ED on 10/6 and had cardiac workup.  Patient was referred to cardiology and had a normal stress echo images on 10/11 which I reviewed but patient did have 1.5 mm downsloping ST depression which persisted 3 minutes into recovery.  Findings read as equivocal.  Patient states he has not had any pain between October 6 and this past evening.  Patient denies thromboembolic risk factors such as family history, smoking, recent travel, recent surgery.  Patient denies cardiac history.  Patient denies history of hypertension, diabetes, high cholesterol or smoking.    Objective:     Past Medical History:    Arthritis    Asthma (HCC)    Back pain    Back problem    Difficult intubation    pt unaware of any previous difficulties with intubation    Extrinsic asthma, unspecified    Osteoarthritis    Polyneuropathy associated with underlying disease (HCC)    PONV (postoperative nausea and vomiting)    Shortness of breath    Thyroid disease    Visual impairment              Past Surgical History:   Procedure Laterality Date    Colonoscopy  1/2015    Colonoscopy N/A 1/20/2015    Procedure: COLONOSCOPY;  Surgeon: Shayne Kirk MD;  Location:  ENDOSCOPY    Hernia surgery      umbilical hernia    Other      L KNEE MENICUS REPAIR, R AND L SHOULDER ROTATOR CUFF  REPAIR    Other surgical history   80's    L varicocelectomy    Other surgical history  90's    R hydrocelectomy    Other surgical history      L&R Shoulder                Social History     Socioeconomic History    Marital status: Single   Tobacco Use    Smoking status: Never     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)    Smokeless tobacco: Never   Vaping Use    Vaping status: Never Used   Substance and Sexual Activity    Alcohol use: No    Drug use: No   Other Topics Concern    Caffeine Concern Yes     Comment: green tea    Exercise Yes     Comment: walking     Social Drivers of Health     Physical Activity: Sufficiently Active (12/23/2020)    Received from [employer] [employer]    Exercise Vital Sign     Days of Exercise per Week: 7 days     Minutes of Exercise per Session: 30 min    Received from [facility], [facility]    Social Connections    Received from [facility], [facility]    Housing Stability                  Physical Exam     ED Triage Vitals [11/06/24 0336]   /69   Pulse 76   Resp 18   Temp    Temp src    SpO2 100 %   O2 Device        Current Vitals:   Vital Signs  BP: 135/69  Pulse: 76  Resp: 18    Oxygen Therapy  SpO2: 100 %        Physical Exam  GENERAL: No acute distress, well appearing and non-toxic, Alert and oriented X 3   HEENT: Normocephalic, atraumatic.  Moist mucous membranes.  Pupils equal round reactive to light and accommodation, extraocular motion is intact, sclerae white, conjunctiva is pink.  Oropharynx is unremarkable, no exudate.  NECK: Supple, trachea midline, no lymphadenopathy.   LUNG: Lungs clear to auscultation bilaterally, no wheezing, no rales, no rhonchi.  CARDIOVASCULAR: Regular rate and rhythm.  Normal S1S2.  No S3S4 or murmur.  ABDOMEN: Bowel sounds are present. Soft. nondistended, no pulsatile masses. nontender  MUSCULOSKELETAL: No calf tenderness.  Dorsalis and Posterior  Tibial pulses present. No clubbing. No cyanosis.  No edema.   SKIN EXAMINATIoN: Warm and dry with normal appearance.  No rashes or lesions.  NEUROLOGICAL:  Motor strength intact all groups.  normal sensation, speech intact    ED Course     Labs Reviewed   COMP METABOLIC PANEL (14) - Abnormal; Notable for the following components:       Result Value    Glucose 113 (*)     All other components within normal limits   TROPONIN I HIGH SENSITIVITY - Normal   CBC WITH DIFFERENTIAL WITH PLATELET     EKG    Rate, intervals and axes as noted on EKG Report.  Rate: 76  Rhythm: Sinus Rhythm  Reading: First-degree AV block.  No acute changes                I personally reviewed xray films of chest and independent interpretation shows normal mediastinum.  No evidence for pneumonia or pneumothorax .  I also reviewed formal xray report as read by radiology with findings below:  Xray of chest read by [facility] rad radiology shows clear lungs.  No pneumothorax.  Normal heart size with normal mediastinum    CARD ECHO STRESS ECHO/REST AND STRESS(CPT=93350/69851 DMG 25995)    Result Date: 10/11/2024  Table formatting from the original result was not included. PATIENT'S NAME: Pierce Chou PRIMARY PHYSICIAN: Rudolph Adamson MD ORDERING PHYSICIAN: Frommelt, Julie A., APRN SONOGRAPHER: GLENIS Vera PATIENT ACCOUNT #: [id number] MEDICAL RECORD #: [id number] YOB: 1955 DATE OF TEST: 10/11/2024 AGE AT TEST:  69 year old    STRESS ECHOCARDIOGRAM Resting EKG:  NSR, first degree AVB, NSSTTW changes. STAGE    BP HR REST 124/64 80 1 140/68 96 2 158/68 114 3 178/76 130 4   5   Peak 178/76 135 Immediate    2 minute post 154/78 98 5 minute post 120/64 86  RESULTS  Maximal predicted heart rate: 150 Target heart rate (90%): 135 % achieved: 90% EKG Response: Abnormal  Reason for stopping test: Fatigue  Duration of exercise: 9:00 DEMOGRAPHICS:   Gender: Male. Height: 68 inches. Weight: 135 pounds. INDICATIONS: The patient is a 69-year-old male with  abnormal EKG, fatigue, palpitations. INTERPRETATION: This patient exercised for 9 minutes on a Karlos protocol, stopping due to fatigue. Patient achieved a peak heart rate of 135 beats per minute, which is 90% of age predicted maximum heart rate. The patient achieved a peak blood pressure of 178/76 mmHg.  There were 8.2 METs achieved which is average for their age. The EKG was abnormal with 1.5 mm ST segment depression inferolaterally. Rare PAC noted. No angina occurred. Resting echocardiographic images revealed normal chamber sizes and valvular structure with uniformly normal left ventricular contractility. The left ventricular ejection fraction is visually estimated at 60%. Immediate post exercise imaging revealed a decrease in left ventricular size and increased contractility of all wall segments consistent with a normal stress echocardiogram response. IMPRESSION:  Normal stress echo images, however, abnormal EKG response with 1.5 mm ST depression inferolaterally that is downsloping and persists 3 minutes into recovery. This study is considered equivocal. Clinical correlation advised. josh Mckeon MD 10/11/2024 10:10 t  [id number]  10/11/2024 11:32 AM #[id number] cc:        Medications   aspirin chewable tab 324 mg (has no administration in time range)       MDM      Patient is a 69-year-old male presents to ED for evaluation of chest pain.  Differential pneumonia, pneumothorax, ACS.  Patient underwent cardiopulmonary workup.  EKG, troponin, chest x-ray normal.  Spoke with [facility] to to nurse practitioner who stated patient can either follow-up in the office or be admitted for potential coronary CTA or angiogram.  Had discussion with patient he prefers to stay in the hospital.  Case discussed with hospitalist, Dr. Chris as well.  Patient given aspirin.  Workup and results were discussed with patient. Patient has no other questions, complaints or concerns. Patient will be admitted to the hospital  for further workup.        Medical Decision Making      Disposition and Plan     Clinical Impression:  1. Chest pain of uncertain etiology         Disposition:  There is no disposition on file for this visit.  There is no disposition time on file for this visit.    Follow-up:  No follow-up provider specified.        Medications Prescribed:  Current Discharge Medication List              Supplementary Documentation:

## 2024-11-06 NOTE — ED NOTES
Patient seen by cardiology here..  And felt the patient can go home.  The patient was told to return if any severe chest pain or shortness of breath.  Patient was seen by Dr. Dr. Robertson here in the emergency room and discharged..  He was told that he can return here if he has any recurrent chest pain or shortness of breath.  Will recommend close follow-up with cardiology as an outpatient

## 2024-11-06 NOTE — ED QUICK NOTES
Orders for admission, patient is aware of plan and ready to go upstairs. Any questions, please call ED RN Chayo at extension [phone].     Patient Covid vaccination status: Unvaccinated     COVID Test Ordered in ED: None    COVID Suspicion at Admission: N/A    Running Infusions:  None    Mental Status/LOC at time of transport: A&Ox4    Other pertinent information:   CIWA score: N/A   NIH score:  N/A

## 2024-11-25 ENCOUNTER — HOSPITAL ENCOUNTER (OUTPATIENT)
Dept: ULTRASOUND IMAGING | Age: 69
Discharge: HOME | End: 2024-11-25
Attending: FAMILY MEDICINE
Payer: MEDICARE

## 2024-11-25 DIAGNOSIS — E01.0 THYROMEGALY: ICD-10-CM

## 2024-11-25 PROCEDURE — 76536 US EXAM OF HEAD AND NECK: CPT | Performed by: FAMILY MEDICINE

## 2024-11-26 ENCOUNTER — LAB ENCOUNTER (OUTPATIENT)
Dept: LAB | Facility: HOSPITAL | Age: 69
End: 2024-11-26
Attending: FAMILY MEDICINE
Payer: MEDICARE

## 2024-11-26 DIAGNOSIS — E55.9 VITAMIN D DEFICIENCY: ICD-10-CM

## 2024-11-26 DIAGNOSIS — E05.90 HYPERTHYROIDISM: ICD-10-CM

## 2024-11-26 DIAGNOSIS — Z13.29 SCREENING FOR ENDOCRINE, NUTRITIONAL, METABOLIC AND IMMUNITY DISORDER: ICD-10-CM

## 2024-11-26 DIAGNOSIS — Z13.21 SCREENING FOR ENDOCRINE, NUTRITIONAL, METABOLIC AND IMMUNITY DISORDER: ICD-10-CM

## 2024-11-26 DIAGNOSIS — Z13.228 SCREENING FOR ENDOCRINE, NUTRITIONAL, METABOLIC AND IMMUNITY DISORDER: ICD-10-CM

## 2024-11-26 DIAGNOSIS — Z12.5 SPECIAL SCREENING, PROSTATE CANCER: Primary | ICD-10-CM

## 2024-11-26 DIAGNOSIS — Z13.0 SCREENING FOR ENDOCRINE, NUTRITIONAL, METABOLIC AND IMMUNITY DISORDER: ICD-10-CM

## 2024-11-26 DIAGNOSIS — R63.4 LOSS OF WEIGHT: ICD-10-CM

## 2024-11-26 LAB
COMPLEXED PSA SERPL-MCNC: 1.09 NG/ML (ref ?–4)
TSI SER-ACNC: 1.25 UIU/ML (ref 0.55–4.78)
VIT D+METAB SERPL-MCNC: 64.2 NG/ML (ref 30–100)

## 2024-11-26 PROCEDURE — 82306 VITAMIN D 25 HYDROXY: CPT

## 2024-11-26 PROCEDURE — 84445 ASSAY OF TSI GLOBULIN: CPT

## 2024-11-26 PROCEDURE — 36415 COLL VENOUS BLD VENIPUNCTURE: CPT

## 2024-11-26 PROCEDURE — 84443 ASSAY THYROID STIM HORMONE: CPT

## 2024-12-01 LAB — THY STIM IMMUNO: <0.1 IU/L

## 2024-12-04 NOTE — PROGRESS NOTES
HPI:     Pierce Chou is a 69 year old male with a PMH of OA.    Following for:  1. BPH/LUTS  2. OAB/urgency    PCP - Juan Diego Clifton Urologist - Nadir 12/27/22  LOV 12/13/23    Presents for annual check-up. Has had friends die from CaP.  He feels well. Appetite and energy are good.  Exercising regularly.    AUA SS is 5/35 with 2 n, f; 1 u. Happy with LUTS.  Incontinence: none  Penoscrotal LOV: b/l testicular TTP which prohibits me from doing a full exam but no obvious masses.   RADHA (requests annual): ~ 30 g prostate, no nodules or tenderness    UA is negative and PVR was zero.    UTI hx: none  Gross hematuria: none  Tobacco hx: none  Kidney stone hx: none  Fam h/o  malignancy: none    Poor potency and prefers observation.    PSA 1.09 11/26/24    Drinks ~ 60 oz water, 20 tea with variable urine. I encouraged the pt drink at least 40-60 oz water per day or enough to keep urine clear to pale yellow and avoid dietary irritants to help with OAB.    He prefers observation for BPH/LUTS, ED, urinary frequency. He will continue annual PSA check and wants to f/u annually with me.    Prior note:  He wants to discuss prior UA showing trace protein.  Discussed that trace protein is a variant of normal and should not require any further w/u.    HISTORY:  Past Medical History:    Arthritis    Asthma (HCC)    Back pain    Back problem    Difficult intubation    pt unaware of any previous difficulties with intubation    Extrinsic asthma, unspecified    Osteoarthritis    Polyneuropathy associated with underlying disease (HCC)    PONV (postoperative nausea and vomiting)    Shortness of breath    Thyroid disease    Visual impairment      Past Surgical History:   Procedure Laterality Date    Colonoscopy  1/2015    Colonoscopy N/A 1/20/2015    Procedure: COLONOSCOPY;  Surgeon: Shayne Kirk MD;  Location:  ENDOSCOPY    Hernia surgery      umbilical hernia    Other      L KNEE MENICUS REPAIR, R AND L SHOULDER ROTATOR CUFF   REPAIR    Other surgical history  80's    L varicocelectomy    Other surgical history  90's    R hydrocelectomy    Other surgical history      L&R Shoulder      Family History   Problem Relation Age of Onset    Stroke Mother     Diabetes Brother       Social History:   Social History     Socioeconomic History    Marital status: Single   Tobacco Use    Smoking status: Never     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)    Smokeless tobacco: Never   Vaping Use    Vaping status: Never Used   Substance and Sexual Activity    Alcohol use: No    Drug use: No   Other Topics Concern    Caffeine Concern Yes     Comment: green tea    Exercise Yes     Comment: walking     Social Drivers of Health     Physical Activity: Sufficiently Active (12/23/2020)    Received from [employer] [employer]    Exercise Vital Sign     Days of Exercise per Week: 7 days     Minutes of Exercise per Session: 30 min    Received from [facility], [facility]    Social Connections    Received from [facility], [facility]    Housing Stability        Medications (Active prior to today's visit):  Current Outpatient Medications   Medication Sig Dispense Refill    Vitamin D-Vitamin K (VITAMIN K2-VITAMIN D3 OR) Take 1 capsule by mouth once daily.      omega-3 Oral Liquid Take 15 mL by mouth daily.      Turmeric Curcumin 500 MG Oral Cap Take 1 capsule by mouth once daily.      NATTOKINASE OR Take 1 tablet by mouth every morning before breakfast.      Quercetin 500 MG Oral Cap Take 1 capsule by mouth once daily.      Coenzyme Q10 100 MG Oral Tab Take 1 tablet by mouth once daily.      Cyanocobalamin (VITAMIN B-12 OR) Take 1 tablet by mouth once daily.      MILK THISTLE OR Take 1 tablet by mouth once daily.         Allergies:  Allergies   Allergen Reactions    Cephalexin HIVES     Has taken amoxicillin in the past         ROS:     A  comprehensive 10 point review of systems was completed.  Pertinent positives and negatives noted in the the HPI.    PHYSICAL EXAM:     GENERAL APPEARANCE: well, developed, well nourished, in no acute distress  NEUROLOGIC: nonfocal, alert and oriented  HEAD: normocephalic, atraumatic  EYES: sclera non-icteric  EARS: hearing intact  ORAL CAVITY: mucosa moist  NECK/THYROID: no obvious goiter or masses  LUNGS: nonlabored breathing  ABDOMEN: soft, no obvious masses or tenderness  SKIN: no obvious rashes    : as noted above    ASSESSMENT/PLAN:   Diagnoses and all orders for this visit:    BPH with obstruction/lower urinary tract symptoms    Urinary urgency    Erectile dysfunction, unspecified erectile dysfunction type    - as noted above.    Thanks again for this consult.    Scooter Payan MD, FACS  Urologist  [facility]  Office: [phone]

## 2024-12-13 ENCOUNTER — OFFICE VISIT (OUTPATIENT)
Dept: SURGERY | Facility: CLINIC | Age: 69
End: 2024-12-13

## 2024-12-13 DIAGNOSIS — R39.15 URINARY URGENCY: ICD-10-CM

## 2024-12-13 DIAGNOSIS — N40.1 BPH WITH OBSTRUCTION/LOWER URINARY TRACT SYMPTOMS: Primary | ICD-10-CM

## 2024-12-13 DIAGNOSIS — N52.9 ERECTILE DYSFUNCTION, UNSPECIFIED ERECTILE DYSFUNCTION TYPE: ICD-10-CM

## 2024-12-13 DIAGNOSIS — N13.8 BPH WITH OBSTRUCTION/LOWER URINARY TRACT SYMPTOMS: Primary | ICD-10-CM

## 2024-12-13 LAB
APPEARANCE: CLEAR
BILIRUBIN: NEGATIVE
GLUCOSE (URINE DIPSTICK): NEGATIVE MG/DL
KETONES (URINE DIPSTICK): NEGATIVE MG/DL
LEUKOCYTES: NEGATIVE
MULTISTIX LOT#: NORMAL NUMERIC
NITRITE, URINE: NEGATIVE
OCCULT BLOOD: NEGATIVE
PH, URINE: 6.5 (ref 4.5–8)
PROTEIN (URINE DIPSTICK): NEGATIVE MG/DL
SPECIFIC GRAVITY: 1.02 (ref 1–1.03)
URINE-COLOR: YELLOW
UROBILINOGEN,SEMI-QN: 0.2 MG/DL (ref 0–1.9)

## 2024-12-13 PROCEDURE — 81003 URINALYSIS AUTO W/O SCOPE: CPT | Performed by: UROLOGY

## 2024-12-13 PROCEDURE — 99214 OFFICE O/P EST MOD 30 MIN: CPT | Performed by: UROLOGY

## 2024-12-13 PROCEDURE — G2211 COMPLEX E/M VISIT ADD ON: HCPCS | Performed by: UROLOGY

## 2024-12-13 RX ORDER — MELOXICAM 7.5 MG/1
7.5 TABLET ORAL
COMMUNITY
Start: 2024-11-19

## 2025-01-18 ENCOUNTER — LAB ENCOUNTER (OUTPATIENT)
Dept: LAB | Facility: HOSPITAL | Age: 70
End: 2025-01-18
Attending: FAMILY MEDICINE
Payer: MEDICARE

## 2025-01-18 DIAGNOSIS — R73.09 IMPAIRED GLUCOSE TOLERANCE TEST: Primary | ICD-10-CM

## 2025-01-18 LAB
ANION GAP SERPL CALC-SCNC: 5 MMOL/L (ref 0–18)
BUN BLD-MCNC: 19 MG/DL (ref 9–23)
CALCIUM BLD-MCNC: 10.1 MG/DL (ref 8.7–10.6)
CHLORIDE SERPL-SCNC: 106 MMOL/L (ref 98–112)
CO2 SERPL-SCNC: 30 MMOL/L (ref 21–32)
CREAT BLD-MCNC: 1.02 MG/DL
EGFRCR SERPLBLD CKD-EPI 2021: 79 ML/MIN/1.73M2 (ref 60–?)
EST. AVERAGE GLUCOSE BLD GHB EST-MCNC: 111 MG/DL (ref 68–126)
FASTING STATUS PATIENT QL REPORTED: YES
GLUCOSE BLD-MCNC: 89 MG/DL (ref 70–99)
HBA1C MFR BLD: 5.5 % (ref ?–5.7)
INSULIN SERPL-ACNC: 2.7 MU/L (ref 3–25)
OSMOLALITY SERPL CALC.SUM OF ELEC: 294 MOSM/KG (ref 275–295)
POTASSIUM SERPL-SCNC: 4.2 MMOL/L (ref 3.5–5.1)
SODIUM SERPL-SCNC: 141 MMOL/L (ref 136–145)

## 2025-01-18 PROCEDURE — 83525 ASSAY OF INSULIN: CPT

## 2025-01-18 PROCEDURE — 80048 BASIC METABOLIC PNL TOTAL CA: CPT

## 2025-01-18 PROCEDURE — 36415 COLL VENOUS BLD VENIPUNCTURE: CPT

## 2025-01-18 PROCEDURE — 83036 HEMOGLOBIN GLYCOSYLATED A1C: CPT

## 2025-02-24 ENCOUNTER — HOSPITAL ENCOUNTER (OUTPATIENT)
Dept: ULTRASOUND IMAGING | Facility: HOSPITAL | Age: 70
Discharge: HOME | End: 2025-02-24
Attending: FAMILY MEDICINE
Payer: MEDICARE

## 2025-02-24 DIAGNOSIS — R10.32 LEFT INGUINAL PAIN: ICD-10-CM

## 2025-02-24 DIAGNOSIS — K40.90 INGUINAL HERNIA WITHOUT OBSTRUCTION OR GANGRENE: ICD-10-CM

## 2025-02-24 PROCEDURE — 93975 VASCULAR STUDY: CPT | Performed by: FAMILY MEDICINE

## 2025-02-24 PROCEDURE — 76870 US EXAM SCROTUM: CPT | Performed by: FAMILY MEDICINE

## 2025-02-24 PROCEDURE — 76882 US LMTD JT/FCL EVL NVASC XTR: CPT | Performed by: FAMILY MEDICINE

## 2025-03-13 ENCOUNTER — HOSPITAL ENCOUNTER (OUTPATIENT)
Dept: MRI IMAGING | Facility: HOSPITAL | Age: 70
Discharge: HOME | End: 2025-03-13
Attending: PODIATRIST
Payer: MEDICARE

## 2025-03-13 DIAGNOSIS — M84.375A STRESS FRACTURE OF LEFT FOOT: ICD-10-CM

## 2025-03-13 PROCEDURE — 73718 MRI LOWER EXTREMITY W/O DYE: CPT | Performed by: PODIATRIST

## 2025-03-18 ENCOUNTER — OFFICE VISIT (OUTPATIENT)
Facility: LOCATION | Age: 70
End: 2025-03-18
Payer: MEDICARE

## 2025-03-18 VITALS
HEART RATE: 61 BPM | DIASTOLIC BLOOD PRESSURE: 62 MMHG | SYSTOLIC BLOOD PRESSURE: 106 MMHG | OXYGEN SATURATION: 98 % | TEMPERATURE: 98 F

## 2025-03-18 DIAGNOSIS — R10.32 LEFT GROIN PAIN: Primary | ICD-10-CM

## 2025-03-18 RX ORDER — METHYLPREDNISOLONE 4 MG/1
TABLET ORAL
COMMUNITY
Start: 2025-03-17

## 2025-03-18 RX ORDER — CICLOPIROX 80 MG/ML
SOLUTION TOPICAL
COMMUNITY
Start: 2025-03-17

## 2025-03-18 NOTE — H&P
New Patient Visit Note       Active Problems      1. Left groin pain        Chief Complaint   Chief Complaint   Patient presents with    New Patient     NP - Hernia on left inguinal and umbilical area, Prev. Dr. Orta pt, US on 2/24 GROIN LEFT LIMITED, US on 2/24 SCROTUM W/ DOPPLER,        History of Present Illness   69 year old male who presents for evaluation of a suspected umbilical and left inguinal hernia. He notes that he has been experiencing left inguinal pain and bulges that is intermittent, but when he gets checked, his GP is unable to find the hernia. He notes a significant history of hydroceles, varicoceles, an umbilical hernia surgical repair, and several colonoscopies.       Allergies  Pierce is allergic to cephalexin.    Past Medical / Surgical / Social / Family History    The past medical and past surgical history have been reviewed by me today.    Past Medical History:    Arthritis    Asthma (HCC)    Back pain    Back problem    Difficult intubation    pt unaware of any previous difficulties with intubation    Extrinsic asthma, unspecified    Osteoarthritis    Polyneuropathy associated with underlying disease (HCC)    PONV (postoperative nausea and vomiting)    Shortness of breath    Thyroid disease    Visual impairment     Past Surgical History:   Procedure Laterality Date    Colonoscopy  1/2015    Colonoscopy N/A 1/20/2015    Procedure: COLONOSCOPY;  Surgeon: Shayne Kirk MD;  Location:  ENDOSCOPY    Hernia surgery      umbilical hernia    Other      L KNEE MENICUS REPAIR, R AND L SHOULDER ROTATOR CUFF  REPAIR    Other surgical history  80's    L varicocelectomy    Other surgical history  90's    R hydrocelectomy    Other surgical history      L&R Shoulder       The family history and social history have been reviewed by me today.    Family History   Problem Relation Age of Onset    Stroke Mother     Diabetes Brother      Social History     Socioeconomic History    Marital status: Single    Tobacco Use    Smoking status: Never     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)    Smokeless tobacco: Never   Vaping Use    Vaping status: Never Used   Substance and Sexual Activity    Alcohol use: No    Drug use: No   Other Topics Concern    Caffeine Concern Yes     Comment: green tea    Exercise Yes     Comment: walking        Current Outpatient Medications:     Ciclopirox 8 % External Solution, , Disp: , Rfl:     methylPREDNISolone 4 MG Oral Tablet Therapy Pack, , Disp: , Rfl:     Vitamin D-Vitamin K (VITAMIN K2-VITAMIN D3 OR), Take 1 capsule by mouth once daily., Disp: , Rfl:     omega-3 Oral Liquid, Take 15 mL by mouth daily., Disp: , Rfl:     Turmeric Curcumin 500 MG Oral Cap, Take 1 capsule by mouth once daily., Disp: , Rfl:     Quercetin 500 MG Oral Cap, Take 1 capsule by mouth once daily., Disp: , Rfl:     Coenzyme Q10 100 MG Oral Tab, Take 1 tablet by mouth once daily., Disp: , Rfl:     Cyanocobalamin (VITAMIN B-12 OR), Take 1 tablet by mouth once daily., Disp: , Rfl:     MILK THISTLE OR, Take 1 tablet by mouth once daily., Disp: , Rfl:     Meloxicam 7.5 MG Oral Tab, Take 1 tablet (7.5 mg total) by mouth daily as needed. (Patient not taking: Reported on 3/18/2025), Disp: , Rfl:     NATTOKINASE OR, Take 1 tablet by mouth every morning before breakfast. (Patient not taking: Reported on 3/18/2025), Disp: , Rfl:       Review of Systems  The Review of Systems has been reviewed by me during today.  Review of Systems   Constitutional: Negative.    HENT: Negative.     Eyes: Negative.    Respiratory: Negative.     Cardiovascular: Negative.    Gastrointestinal: Negative.    Genitourinary: Negative.    Musculoskeletal: Negative.    Skin: Negative.    Neurological: Negative.    Psychiatric/Behavioral: Negative.         Physical Findings   /62 (BP Location: Left arm, Patient Position: Sitting, Cuff Size: adult)   Pulse 61   Temp 98.2 °F (36.8 °C) (Temporal)   SpO2 98%   Physical  Exam  Constitutional:       Appearance: He is well-developed.   Cardiovascular:      Rate and Rhythm: Normal rate and regular rhythm.      Heart sounds: Normal heart sounds.   Pulmonary:      Effort: Pulmonary effort is normal.      Breath sounds: Normal breath sounds.   Abdominal:      General: Bowel sounds are normal.      Palpations: Abdomen is soft.   Skin:     General: Skin is warm and dry.   Neurological:      Mental Status: He is alert and oriented to person, place, and time.      Deep Tendon Reflexes: Reflexes are normal and symmetric.             Assessment/Plan  1. Left groin pain        Pierce Chou is a 69 year old male referred for evaluation of suspected inguinal hernia. Patient complains of left sided inguinal bulge with concerns of inguinal hernia. Patient with multiple scrotal interventions for similar complains over the last 7 years, most recently with Dr. Orta in 2019 who obtained ultrasound and MRI results that showed no findings of inguinal hernia at that time. His ultrasound does not show an inguinal hernia. On exam I am not able to palpate a hernia.       At this time, further imaging is warranted and I will order an updated MRI of the pelvis for the best outlook of the presence of the inguinal hernia. He will follow up after the imaging is completed to review the imaging and discuss treatment options.     No orders of the defined types were placed in this encounter.      Imaging & Referrals   MRI PELVIS (SOFT TISSUE) (W+WO) (CPT=72197)    Follow Up  No follow-ups on file.    Tanesha Mckay MD    The documentation for this encounter was entered by Janina Carlos acting as a Medical Scribe for Dr. Mckay.    All medical record entries made by Scribe were at my direction and personally dictated by me. I have reviewed the chart and agree that the record accurately reflects my personal performance of the history, physical exam, assessment and plan. I have personally directed, reviewed,  and agree with the instructions.     CC  Rudolph Adamson MD

## 2025-03-26 ENCOUNTER — TELEPHONE (OUTPATIENT)
Facility: LOCATION | Age: 70
End: 2025-03-26

## 2025-03-26 DIAGNOSIS — R10.32 LEFT GROIN PAIN: Primary | ICD-10-CM

## 2025-03-26 NOTE — TELEPHONE ENCOUNTER
Patient called to ask if he can do his MRI without contrast. He states that when he does the contrast it gives him headaches and brain fog.    Please advise   # [phone]

## 2025-03-27 NOTE — TELEPHONE ENCOUNTER
Spoke to patient to let him know that Dr Mckay has changed his MRI to be without contrast.  Patient verbalized understanding.

## 2025-04-14 ENCOUNTER — HOSPITAL ENCOUNTER (OUTPATIENT)
Dept: MRI IMAGING | Facility: HOSPITAL | Age: 70
Discharge: HOME | End: 2025-04-14
Attending: STUDENT IN AN ORGANIZED HEALTH CARE EDUCATION/TRAINING PROGRAM
Payer: MEDICARE

## 2025-04-14 DIAGNOSIS — R10.32 LEFT GROIN PAIN: ICD-10-CM

## 2025-04-14 PROCEDURE — 72195 MRI PELVIS W/O DYE: CPT | Performed by: STUDENT IN AN ORGANIZED HEALTH CARE EDUCATION/TRAINING PROGRAM

## 2025-04-24 ENCOUNTER — OFFICE VISIT (OUTPATIENT)
Facility: LOCATION | Age: 70
End: 2025-04-24
Payer: MEDICARE

## 2025-04-24 VITALS
TEMPERATURE: 99 F | OXYGEN SATURATION: 97 % | HEART RATE: 57 BPM | SYSTOLIC BLOOD PRESSURE: 97 MMHG | DIASTOLIC BLOOD PRESSURE: 53 MMHG

## 2025-04-24 DIAGNOSIS — R10.32 LEFT GROIN PAIN: Primary | ICD-10-CM

## 2025-04-24 PROCEDURE — 99215 OFFICE O/P EST HI 40 MIN: CPT | Performed by: STUDENT IN AN ORGANIZED HEALTH CARE EDUCATION/TRAINING PROGRAM

## 2025-04-24 NOTE — PROGRESS NOTES
The following individual(s) verbally consented to be recorded using ambient AI listening technology and understand that they can each withdraw their consent to this listening technology at any point by asking the clinician to turn off or pause the recording:    Patient name: Pierce ESCAMILLA José Antonio  Additional names:  ***

## 2025-04-29 ENCOUNTER — TELEPHONE (OUTPATIENT)
Dept: PHYSICAL THERAPY | Age: 70
End: 2025-04-29

## 2025-05-01 NOTE — PROGRESS NOTES
Follow Up Visit Note       Active Problems      1. Left groin pain          Chief Complaint   Chief Complaint   Patient presents with    [facility]     EP- L groin pain. Discuss MRI results. Pt still having pain to his L groin. Pt very surprised to see that no hernia showed on his MRI.          History of Present Illness  69 year old male presents for evaluation and treatment of left groin pain. He underwent an MRI of the pelvis. He is here to review the results. Continues to have intermittent left groin pain.     Imaging/Lab Results    Allergies  Pierce is allergic to cephalexin.    Past Medical / Surgical / Social / Family History    The past medical and past surgical history have been reviewed by me today.    Past Medical History[1]  Past Surgical History[2]    The family history and social history have been reviewed by me today.    Family History[3]  Social Hx on file[4]   Medications - Current[5]     Review of Systems  The Review of Systems has been reviewed by me during today.  Review of Systems   Constitutional:  Negative for chills, diaphoresis, fatigue and fever.   HENT:  Negative for ear discharge, ear pain and sore throat.    Eyes:  Negative for pain and discharge.   Respiratory:  Negative for cough, chest tightness and shortness of breath.    Cardiovascular:  Negative for chest pain, palpitations and leg swelling.   Gastrointestinal:  Negative for abdominal distention, abdominal pain, blood in stool, constipation, diarrhea, nausea and vomiting.        Left groin pain   Genitourinary:  Negative for dysuria, frequency, hematuria and urgency.   Skin:  Negative for color change, pallor and rash.   Neurological:  Negative for weakness, light-headedness, numbness and headaches.   Hematological:  Negative for adenopathy. Does not bruise/bleed easily.   Psychiatric/Behavioral:  Negative for agitation and confusion.         Physical Findings   BP 97/53 (BP Location: Left arm, Patient Position: Sitting, Cuff  Telephone Encounter by Hailey Vines RN at 07/17/17 05:01 PM     Author:  Hailey Vines RN Service:  (none) Author Type:  Registered Nurse     Filed:  07/17/17 05:01 PM Encounter Date:  7/17/2017 Status:  Signed     :  Hailey Vines RN (Registered Nurse)            Prescriptions faxed.[HB1.1M]       Revision History        User Key Date/Time User Provider Type Action    > HB1.1 07/17/17 05:01 PM Hailey Vines RN Registered Nurse Sign    M - Manual             Size: adult)   Pulse 57   Temp 98.7 °F (37.1 °C) (Temporal)   SpO2 97%   Physical Exam  Constitutional:       Appearance: Normal appearance.   HENT:      Head: Normocephalic and atraumatic.   Cardiovascular:      Pulses: Normal pulses.   Pulmonary:      Effort: Pulmonary effort is normal.   Abdominal:      General: Abdomen is flat. There is no distension.      Palpations: Abdomen is soft.      Tenderness: There is no abdominal tenderness. There is no guarding or rebound.      Hernia: There is no hernia in the left inguinal area or right inguinal area.      Comments: No palpable inguinal hernias on exam   Skin:     General: Skin is warm.      Capillary Refill: Capillary refill takes less than 2 seconds.   Neurological:      Mental Status: He is alert and oriented to person, place, and time. Mental status is at baseline.              Assessment/Plan  1. Left groin pain        Pierce Chou is a 69 year old male  with left groin pain. Patient had multiple urologic interventions to the left groin and scrotum. He has had intermittently presented to this office with complaints of a bulge and left sided groin pain over the years. He presented to me lately with recurrence of pain in the left groin. I obtained an MRI and reviewed it very carefully and discussed the results with him. There is no evidence of an inguinal hernia on either side. There are no signs of ligamentous injury or tear. There is mild  bilateral chondromalacia of the hips without clear osteoarthritis. At this time I do not believe there is an inguinal hernia to repair. I discussed with him the different etiologies of groin pain including occult hernia, pubalgia, nerve pain from prior scarring and intervention, spinal etiologies for the pain, osteoarthritis. I discussed with him the next steps. I recommend he proceeds with physical therapy to strengthen the muscles in the area and see if this addressed his pain. He has follow up with me in 2 -3 months  to assess resolution of symptoms.           No orders of the defined types were placed in this encounter.      Imaging & Referrals   OP REFERRAL TO [facility]    Follow Up  No follow-ups on file.    Tanesha Mckay MD         [1]   Past Medical History:   Arthritis    Asthma (HCC)    Back pain    Back problem    Difficult intubation    pt unaware of any previous difficulties with intubation    Extrinsic asthma, unspecified    Osteoarthritis    Polyneuropathy associated with underlying disease (HCC)    PONV (postoperative nausea and vomiting)    Shortness of breath    Thyroid disease    Visual impairment   [2]   Past Surgical History:  Procedure Laterality Date    Colonoscopy  1/2015    Colonoscopy N/A 1/20/2015    Procedure: COLONOSCOPY;  Surgeon: Shayne Kirk MD;  Location:  ENDOSCOPY    Hernia surgery      umbilical hernia    Other      L KNEE MENICUS REPAIR, R AND L SHOULDER ROTATOR CUFF  REPAIR    Other surgical history  80's    L varicocelectomy    Other surgical history  90's    R hydrocelectomy    Other surgical history      L&R Shoulder   [3]   Family History  Problem Relation Age of Onset    Stroke Mother     Diabetes Brother    [4]   Social History  Socioeconomic History    Marital status: Single   Tobacco Use    Smoking status: Never     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)    Smokeless tobacco: Never   Vaping Use    Vaping status: Never Used   Substance and Sexual Activity    Alcohol use: No    Drug use: No   Other Topics Concern    Caffeine Concern Yes     Comment: green tea    Exercise Yes     Comment: walking   [5]   Current Outpatient Medications:     Ciclopirox 8 % External Solution, , Disp: , Rfl:     methylPREDNISolone 4 MG Oral Tablet Therapy Pack, , Disp: , Rfl:     Vitamin D-Vitamin K (VITAMIN K2-VITAMIN D3 OR), Take 1 capsule by mouth in the morning., Disp: , Rfl:     omega-3 Oral Liquid, Take 15 mL by mouth in the morning., Disp: , Rfl:      Turmeric Curcumin 500 MG Oral Cap, Take 1 capsule by mouth in the morning., Disp: , Rfl:     Quercetin 500 MG Oral Cap, Take 1 capsule by mouth in the morning., Disp: , Rfl:     Coenzyme Q10 100 MG Oral Tab, Take 1 tablet by mouth in the morning., Disp: , Rfl:     Cyanocobalamin (VITAMIN B-12 OR), Take 1 tablet by mouth in the morning., Disp: , Rfl:     MILK THISTLE OR, Take 1 tablet by mouth in the morning., Disp: , Rfl:     Meloxicam 7.5 MG Oral Tab, Take 1 tablet (7.5 mg total) by mouth daily as needed. (Patient not taking: Reported on 3/18/2025), Disp: , Rfl:     NATTOKINASE OR, Take 1 tablet by mouth every morning before breakfast. (Patient not taking: Reported on 4/24/2025), Disp: , Rfl:

## 2025-06-22 ENCOUNTER — HOSPITAL ENCOUNTER (EMERGENCY)
Facility: HOSPITAL | Age: 70
Discharge: HOME | End: 2025-06-22
Attending: EMERGENCY MEDICINE
Payer: MEDICARE

## 2025-06-22 ENCOUNTER — APPOINTMENT (OUTPATIENT)
Dept: GENERAL RADIOLOGY | Facility: HOSPITAL | Age: 70
End: 2025-06-22
Attending: EMERGENCY MEDICINE
Payer: MEDICARE

## 2025-06-22 VITALS
TEMPERATURE: 98 F | WEIGHT: 135 LBS | DIASTOLIC BLOOD PRESSURE: 97 MMHG | BODY MASS INDEX: 20.46 KG/M2 | RESPIRATION RATE: 17 BRPM | HEART RATE: 84 BPM | SYSTOLIC BLOOD PRESSURE: 120 MMHG | HEIGHT: 68 IN | OXYGEN SATURATION: 100 %

## 2025-06-22 DIAGNOSIS — T18.128A ESOPHAGEAL OBSTRUCTION DUE TO FOOD IMPACTION: Primary | ICD-10-CM
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
## 2025-06-22 LAB
ALBUMIN SERPL-MCNC: 4.7 G/DL (ref 3.2–4.8)
ALBUMIN/GLOB SERPL: 1.7 {RATIO} (ref 1–2)
ALP LIVER SERPL-CCNC: 71 U/L (ref 45–117)
ALT SERPL-CCNC: 31 U/L (ref 10–49)
ANION GAP SERPL CALC-SCNC: 11 MMOL/L (ref 0–18)
AST SERPL-CCNC: 24 U/L (ref ?–34)
BASOPHILS # BLD AUTO: 0.06 X10(3) UL (ref 0–0.2)
BASOPHILS NFR BLD AUTO: 0.7 %
BILIRUB SERPL-MCNC: 0.4 MG/DL (ref 0.2–1.1)
BUN BLD-MCNC: 27 MG/DL (ref 9–23)
CALCIUM BLD-MCNC: 9.7 MG/DL (ref 8.7–10.6)
CHLORIDE SERPL-SCNC: 106 MMOL/L (ref 98–112)
CO2 SERPL-SCNC: 24 MMOL/L (ref 21–32)
CREAT BLD-MCNC: 1.05 MG/DL (ref 0.7–1.3)
EGFRCR SERPLBLD CKD-EPI 2021: 76 ML/MIN/1.73M2 (ref 60–?)
EOSINOPHIL # BLD AUTO: 0.19 X10(3) UL (ref 0–0.7)
EOSINOPHIL NFR BLD AUTO: 2.1 %
ERYTHROCYTE [DISTWIDTH] IN BLOOD BY AUTOMATED COUNT: 13 %
GLOBULIN PLAS-MCNC: 2.7 G/DL (ref 2–3.5)
GLUCOSE BLD-MCNC: 134 MG/DL (ref 70–99)
HCT VFR BLD AUTO: 44.8 % (ref 39–53)
HGB BLD-MCNC: 15.5 G/DL (ref 13–17.5)
IMM GRANULOCYTES # BLD AUTO: 0.04 X10(3) UL (ref 0–1)
IMM GRANULOCYTES NFR BLD: 0.4 %
LYMPHOCYTES # BLD AUTO: 2.17 X10(3) UL (ref 1–4)
LYMPHOCYTES NFR BLD AUTO: 23.8 %
MCH RBC QN AUTO: 33.3 PG (ref 26–34)
MCHC RBC AUTO-ENTMCNC: 34.6 G/DL (ref 31–37)
MCV RBC AUTO: 96.3 FL (ref 80–100)
MONOCYTES # BLD AUTO: 1.05 X10(3) UL (ref 0.1–1)
MONOCYTES NFR BLD AUTO: 11.5 %
NEUTROPHILS # BLD AUTO: 5.61 X10 (3) UL (ref 1.5–7.7)
NEUTROPHILS # BLD AUTO: 5.61 X10(3) UL (ref 1.5–7.7)
NEUTROPHILS NFR BLD AUTO: 61.5 %
OSMOLALITY SERPL CALC.SUM OF ELEC: 299 MOSM/KG (ref 275–295)
PLATELET # BLD AUTO: 191 10(3)UL (ref 150–450)
POTASSIUM SERPL-SCNC: 4.1 MMOL/L (ref 3.5–5.1)
PROT SERPL-MCNC: 7.4 G/DL (ref 5.7–8.2)
RBC # BLD AUTO: 4.65 X10(6)UL (ref 3.8–5.8)
SODIUM SERPL-SCNC: 141 MMOL/L (ref 136–145)
WBC # BLD AUTO: 9.1 X10(3) UL (ref 4–11)

## 2025-06-22 PROCEDURE — 80053 COMPREHEN METABOLIC PANEL: CPT | Performed by: EMERGENCY MEDICINE

## 2025-06-22 PROCEDURE — 85025 COMPLETE CBC W/AUTO DIFF WBC: CPT | Performed by: EMERGENCY MEDICINE

## 2025-06-22 PROCEDURE — 99284 EMERGENCY DEPT VISIT MOD MDM: CPT

## 2025-06-22 PROCEDURE — 36415 COLL VENOUS BLD VENIPUNCTURE: CPT

## 2025-06-22 PROCEDURE — 93005 ELECTROCARDIOGRAM TRACING: CPT

## 2025-06-22 PROCEDURE — 71045 X-RAY EXAM CHEST 1 VIEW: CPT | Performed by: EMERGENCY MEDICINE

## 2025-06-22 PROCEDURE — 93010 ELECTROCARDIOGRAM REPORT: CPT

## 2025-06-22 PROCEDURE — 99285 EMERGENCY DEPT VISIT HI MDM: CPT

## 2025-06-23 LAB
ATRIAL RATE: 84 BPM
P AXIS: 74 DEGREES
P-R INTERVAL: 270 MS
Q-T INTERVAL: 374 MS
QRS DURATION: 88 MS
QTC CALCULATION (BEZET): 441 MS
R AXIS: 23 DEGREES
T AXIS: 61 DEGREES
VENTRICULAR RATE: 84 BPM

## 2025-06-23 NOTE — ED INITIAL ASSESSMENT (HPI)
Pt believes he has a piece of chicken breast stuck in his throat. Intermittent coughing/gagging during triage. No drooling noted. Able to communicate clearly.

## 2025-06-23 NOTE — ED PROVIDER NOTES
Patient Seen in: [facility] Emergency Department        History  Chief Complaint   Patient presents with    Choking     Stated Complaint: fb to throat    Subjective:   HPI            69-year-old male presents reporting he was eating some chicken at home when he felt a sudden pain in his chest area.  He said he then regurgitated a few times.  He presents here reporting he starting to feel better but that he tried drinking a small amount of water at home and was unable to.  He is currently tolerating his secretions.  He reports it is a bit uncomfortable when he swallows but he is starting to feel a bit better.  He says he initially thought this might be heartburn which she has had a couple of times in the past.      Objective:     Past Medical History:    Arthritis    Asthma (HCC)    Back pain    Back problem    Difficult intubation    pt unaware of any previous difficulties with intubation    Extrinsic asthma, unspecified    Osteoarthritis    Polyneuropathy associated with underlying disease (HCC)    PONV (postoperative nausea and vomiting)    Shortness of breath    Thyroid disease    Visual impairment              No pertinent past surgical history.              No pertinent social history.                              Physical Exam    ED Triage Vitals [06/22/25 1905]   /72   Pulse 81   Resp 18   Temp 97.6 °F (36.4 °C)   Temp src Temporal   SpO2 99 %   O2 Device None (Room air)       Current Vitals:   Vital Signs  BP: (!) 120/97  Pulse: 84  Resp: 17  Temp: 97.6 °F (36.4 °C)  Temp src: Temporal  MAP (mmHg): (!) 104    Oxygen Therapy  SpO2: 100 %  O2 Device: None (Room air)            Physical Exam  General:  Vitals as listed.    HEENT: Sclerae anicteric.  Conjunctivae show no pallor.  Oropharynx clear, mucous membranes moist   Lungs: good air exchange and clear   Heart: regular rate rhythm and no murmur   Abdomen: Soft and nontender.  No abdominal masses.  No peritoneal signs   Extremities: no edema,  normal peripheral pulses   Neuro: Alert oriented and nonfocal   Skin: no rashes or nodules        ED Course  Labs Reviewed   CBC WITH DIFFERENTIAL WITH PLATELET - Abnormal; Notable for the following components:       Result Value    Monocyte Absolute 1.05 (*)     All other components within normal limits   COMP METABOLIC PANEL (14) - Abnormal; Notable for the following components:    Glucose 134 (*)     BUN 27 (*)     Calculated Osmolality 299 (*)     All other components within normal limits   RAINBOW DRAW LAVENDER   RAINBOW DRAW LIGHT GREEN   RAINBOW DRAW BLUE   RAINBOW DRAW GOLD     EKG    Rate, intervals and axes as noted on EKG Report.  Rate: 84  Rhythm: Sinus Rhythm  Reading: No evidence of acute ischemia or arrhythmia.  First-degree AV block.              XR CHEST AP PORTABLE  (CPT=71045)  Result Date: 6/22/2025  PROCEDURE:  XR CHEST AP PORTABLE  (CPT=71045)  TECHNIQUE:  AP chest radiograph was obtained.  COMPARISON:  MARIA INES , XR, XR CHEST AP PORTABLE  (CPT=71045), 11/06/2024, 3:54 AM.  INDICATIONS:  fb to throat  PATIENT STATED HISTORY: (As transcribed by Technologist)     FINDINGS:  Normal heart size and pulmonary vascularity.  No focal pulmonary opacity.  Degenerative spurring of thoracic spine.  Stable blunting of right costophrenic angle.  No radiopaque foreign object.            CONCLUSION:  No evidence of active cardiopulmonary disease or radiopaque foreign object.   LOCATION:  [facility]      Dictated by (CST): Homero Guerrero MD on 6/22/2025 at 7:50 PM     Finalized by (CST): Homero Guerrero MD on 6/22/2025 at 7:50 PM                         MDM     69-year-old male presents reporting pain in his chest.  Was eating some chicken when he developed this pain and was unable to eat or drink anything after that.    Differential includes but is not limited to esophageal food impaction, reflux, a life/function threat.    CBC, CMP, EKG, chest x-ray ordered for further evaluation.    My independent interpretation of  chest x-ray is that there is no cardiomegaly.  Reviewed radiology documentation which reports no radiopaque foreign body object.    Laboratory evaluation is without any significant abnormalities.  The patient started feeling better here and asked if he could drink some water.  He was able to drink a full cup of water.  He says his discomfort is gone and he can swallow liquids now which he could not do at home.  I do believe he likely had an esophageal food impaction that resolved.  I have encouraged him to follow-up with his primary care doctor regarding this episode.  Should continue monitoring symptoms and return with worsening or any concerns.            Medical Decision Making      Disposition and Plan     Clinical Impression:  1. Esophageal obstruction due to food impaction         Disposition:  Discharge  6/22/2025  9:18 pm    Follow-up:  Rudolph Adamson MD  [address] [address]  [phone]    Follow up            Medications Prescribed:  Current Discharge Medication List                Supplementary Documentation:

## (undated) NOTE — ED AVS SNAPSHOT
Mayi Parkinson   MRN: [id number]    Department:  [facility] Emergency Department   Date of Visit:  12/11/2017           Disclosure     Insurance plans vary and the physician(s) referred by the ER may not be covered by your plan.  Please contact y tell this physician (or your personal doctor if your instructions are to return to your personal doctor) about any new or lasting problems. The primary care or specialist physician will see patients referred from the [facility] Emergency Department.  Celia Stiles

## (undated) NOTE — ED AVS SNAPSHOT
Donna Dodge   MRN: [id number]    Department:  [facility] Emergency Department   Date of Visit:  5/8/2018           Disclosure     Insurance plans vary and the physician(s) referred by the ER may not be covered by your plan.  Please contact you tell this physician (or your personal doctor if your instructions are to return to your personal doctor) about any new or lasting problems. The primary care or specialist physician will see patients referred from the [facility] Emergency Department.  Abiola Lira

## (undated) NOTE — LETTER
19    Patient: Winston Espinoza  : 1955 Visit date: 2019    Dear  Dr. Annalisa Arellano MD,    Thank you for referring Winston Espinoza to my practice. Please find my assessment and plan below.              Assessment   Inguinal pain of both sides  ( He has had no pain remote from the inguinal regions bilaterally. He has never had nausea with this pain or vomiting with the pain.     Both testes are remaining in place with no previous resections despite his multiple resections of varicoceles, spermatoce bilaterally, repair of hydroceles, with 3-4 operations in the regions bilaterally over several years. He has had a left varicocele operation through a transverse incision. I believe his current swelling is not related to her hernia.   It is most likely

## (undated) NOTE — ED AVS SNAPSHOT
Meg Christensen   MRN: [id number]    Department:  [facility] Emergency Department   Date of Visit:  9/24/2017           Disclosure     Insurance plans vary and the physician(s) referred by the ER may not be covered by your plan.  Please contact yo If you have been prescribed any medication(s), please fill your prescription right away and begin taking the medication(s) as directed    If the emergency physician has read X-rays, these will be re-interpreted by a radiologist.  If there is a significant

## (undated) NOTE — ED AVS SNAPSHOT
Donna Lewis   MRN: [id number]    Department:  [facility] Emergency Department   Date of Visit:  1/31/2018           Disclosure     Insurance plans vary and the physician(s) referred by the ER may not be covered by your plan.  Please contact yo tell this physician (or your personal doctor if your instructions are to return to your personal doctor) about any new or lasting problems. The primary care or specialist physician will see patients referred from the [facility] Emergency Department.  Phoebe Iverson

## (undated) NOTE — ED AVS SNAPSHOT
Donna Lewis   MRN: [id number]    Department:  [facility] Emergency Department   Date of Visit:  9/16/2017           Disclosure     Insurance plans vary and the physician(s) referred by the ER may not be covered by your plan.  Please contact yo If you have been prescribed any medication(s), please fill your prescription right away and begin taking the medication(s) as directed    If the emergency physician has read X-rays, these will be re-interpreted by a radiologist.  If there is a significant

## (undated) NOTE — LETTER
19    Patient: Donna Lewis  : 1955 Visit date: 2019    Dear  Dr. Albin Jordan MD,    Thank you for referring Donna Lewis to my practice. Please find my assessment and plan below.         Assessment   Inguinal pain of both sides  (primar His pain is clearly worse with heavy lifting and exercise. He does not have a chronic cough. He has no trouble urinating. He has no symptoms of severe constipation.   He has been to his family doctor who noticed a bulge and felt that he had a left- cough maneuver. The right testicle is also slightly atrophic, there is no thickening of the right cord. The right inguinal region is exquisitely tender. He has no hernia on both cough and Valsalva on standing examination.     This patient remains without

## (undated) NOTE — ED AVS SNAPSHOT
Mary Soriano   MRN: [id number]    Department:  [facility] Emergency Department   Date of Visit:  2/15/2020           Disclosure     Insurance plans vary and the physician(s) referred by the ER may not be covered by your plan.  Please contact yo tell this physician (or your personal doctor if your instructions are to return to your personal doctor) about any new or lasting problems. The primary care or specialist physician will see patients referred from the [facility] Emergency Department.  Desiree Melendez

## (undated) NOTE — ED AVS SNAPSHOT
Di Neff   MRN: [id number]    Department:  [facility] Emergency Department   Date of Visit:  4/26/2018           Disclosure     Insurance plans vary and the physician(s) referred by the ER may not be covered by your plan.  Please contact yo tell this physician (or your personal doctor if your instructions are to return to your personal doctor) about any new or lasting problems. The primary care or specialist physician will see patients referred from the [facility] Emergency Department.  Wilmer Palomino